# Patient Record
Sex: FEMALE | Race: WHITE | Employment: OTHER | URBAN - NONMETROPOLITAN AREA
[De-identification: names, ages, dates, MRNs, and addresses within clinical notes are randomized per-mention and may not be internally consistent; named-entity substitution may affect disease eponyms.]

---

## 2017-02-02 ENCOUNTER — HOSPITAL ENCOUNTER (OUTPATIENT)
Dept: WOMENS IMAGING | Age: 72
Discharge: HOME | End: 2017-02-02
Payer: MEDICARE

## 2017-02-02 DIAGNOSIS — Z12.31 ENCOUNTER FOR SCREENING MAMMOGRAM FOR BREAST CANCER: ICD-10-CM

## 2017-02-02 PROCEDURE — 77063 BREAST TOMOSYNTHESIS BI: CPT

## 2017-08-31 ENCOUNTER — OFFICE VISIT (OUTPATIENT)
Dept: GASTROENTEROLOGY | Facility: CLINIC | Age: 72
End: 2017-08-31

## 2017-08-31 VITALS
SYSTOLIC BLOOD PRESSURE: 128 MMHG | DIASTOLIC BLOOD PRESSURE: 68 MMHG | BODY MASS INDEX: 29.19 KG/M2 | HEIGHT: 67 IN | WEIGHT: 186 LBS | TEMPERATURE: 97.8 F | HEART RATE: 71 BPM | OXYGEN SATURATION: 98 %

## 2017-08-31 DIAGNOSIS — K21.9 GASTROESOPHAGEAL REFLUX DISEASE WITHOUT ESOPHAGITIS: ICD-10-CM

## 2017-08-31 DIAGNOSIS — K22.4 ESOPHAGEAL SPASM: ICD-10-CM

## 2017-08-31 DIAGNOSIS — R12 HEARTBURN: Primary | ICD-10-CM

## 2017-08-31 PROCEDURE — 99214 OFFICE O/P EST MOD 30 MIN: CPT | Performed by: NURSE PRACTITIONER

## 2017-08-31 RX ORDER — CITALOPRAM 10 MG/1
10 TABLET ORAL DAILY
COMMUNITY
Start: 2017-06-22 | End: 2021-01-04

## 2017-08-31 RX ORDER — RANITIDINE 150 MG/1
TABLET ORAL DAILY
COMMUNITY
End: 2020-08-05

## 2017-08-31 RX ORDER — ATENOLOL 25 MG/1
25 TABLET ORAL DAILY
COMMUNITY
Start: 2017-06-22 | End: 2020-03-09 | Stop reason: SDUPTHER

## 2017-08-31 RX ORDER — ERGOCALCIFEROL 1.25 MG/1
50000 CAPSULE ORAL
COMMUNITY
Start: 2017-06-22 | End: 2021-01-04

## 2017-08-31 RX ORDER — ASPIRIN 81 MG/1
81 TABLET ORAL DAILY
COMMUNITY

## 2017-10-26 ENCOUNTER — ANESTHESIA EVENT (OUTPATIENT)
Dept: GASTROENTEROLOGY | Facility: HOSPITAL | Age: 72
End: 2017-10-26

## 2017-10-26 ENCOUNTER — ANESTHESIA (OUTPATIENT)
Dept: GASTROENTEROLOGY | Facility: HOSPITAL | Age: 72
End: 2017-10-26

## 2017-10-26 ENCOUNTER — HOSPITAL ENCOUNTER (OUTPATIENT)
Facility: HOSPITAL | Age: 72
Setting detail: HOSPITAL OUTPATIENT SURGERY
Discharge: HOME | End: 2017-10-26
Attending: INTERNAL MEDICINE | Admitting: INTERNAL MEDICINE

## 2017-10-26 VITALS
WEIGHT: 183 LBS | SYSTOLIC BLOOD PRESSURE: 104 MMHG | RESPIRATION RATE: 20 BRPM | OXYGEN SATURATION: 97 % | DIASTOLIC BLOOD PRESSURE: 60 MMHG | TEMPERATURE: 97.8 F | HEIGHT: 67 IN | HEART RATE: 60 BPM | BODY MASS INDEX: 28.72 KG/M2

## 2017-10-26 DIAGNOSIS — K21.9 GASTROESOPHAGEAL REFLUX DISEASE WITHOUT ESOPHAGITIS: ICD-10-CM

## 2017-10-26 DIAGNOSIS — R12 HEARTBURN: ICD-10-CM

## 2017-10-26 DIAGNOSIS — K22.4 ESOPHAGEAL SPASM: ICD-10-CM

## 2017-10-26 PROCEDURE — 88305 TISSUE EXAM BY PATHOLOGIST: CPT | Performed by: INTERNAL MEDICINE

## 2017-10-26 PROCEDURE — 43239 EGD BIOPSY SINGLE/MULTIPLE: CPT | Performed by: INTERNAL MEDICINE

## 2017-10-26 PROCEDURE — 25010000002 PROPOFOL 10 MG/ML EMULSION: Performed by: NURSE ANESTHETIST, CERTIFIED REGISTERED

## 2017-10-26 RX ORDER — SODIUM CHLORIDE 0.9 % (FLUSH) 0.9 %
1-10 SYRINGE (ML) INJECTION AS NEEDED
Status: CANCELLED | OUTPATIENT
Start: 2017-10-26

## 2017-10-26 RX ORDER — LIDOCAINE HYDROCHLORIDE 20 MG/ML
INJECTION, SOLUTION INFILTRATION; PERINEURAL AS NEEDED
Status: DISCONTINUED | OUTPATIENT
Start: 2017-10-26 | End: 2017-10-26 | Stop reason: SURG

## 2017-10-26 RX ORDER — SODIUM CHLORIDE 9 MG/ML
500 INJECTION, SOLUTION INTRAVENOUS CONTINUOUS PRN
Status: DISCONTINUED | OUTPATIENT
Start: 2017-10-26 | End: 2017-10-26 | Stop reason: HOSPADM

## 2017-10-26 RX ORDER — SODIUM CHLORIDE 9 MG/ML
100 INJECTION, SOLUTION INTRAVENOUS CONTINUOUS
Status: CANCELLED | OUTPATIENT
Start: 2017-10-26

## 2017-10-26 RX ORDER — PROPOFOL 10 MG/ML
VIAL (ML) INTRAVENOUS AS NEEDED
Status: DISCONTINUED | OUTPATIENT
Start: 2017-10-26 | End: 2017-10-26 | Stop reason: SURG

## 2017-10-26 RX ORDER — SODIUM CHLORIDE 0.9 % (FLUSH) 0.9 %
3 SYRINGE (ML) INJECTION AS NEEDED
Status: DISCONTINUED | OUTPATIENT
Start: 2017-10-26 | End: 2017-10-26 | Stop reason: HOSPADM

## 2017-10-26 RX ADMIN — PROPOFOL 50 MG: 10 INJECTION, EMULSION INTRAVENOUS at 12:23

## 2017-10-26 RX ADMIN — LIDOCAINE HYDROCHLORIDE 20 MG: 20 INJECTION, SOLUTION INFILTRATION; PERINEURAL at 12:19

## 2017-10-26 RX ADMIN — SODIUM CHLORIDE 500 ML: 9 INJECTION, SOLUTION INTRAVENOUS at 11:44

## 2017-10-26 RX ADMIN — PROPOFOL 100 MG: 10 INJECTION, EMULSION INTRAVENOUS at 12:19

## 2017-10-26 NOTE — ANESTHESIA POSTPROCEDURE EVALUATION
Patient: Holli Iyer    Procedure Summary     Date Anesthesia Start Anesthesia Stop Room / Location    10/26/17 1216 1226 [facility] ENDOSCOPY 5 / [facility]       Procedure Diagnosis Surgeon Provider    ESOPHAGOGASTRODUODENOSCOPY WITH ANESTHESIA (N/A Esophagus) Heartburn; Esophageal spasm; Gastroesophageal reflux disease without esophagitis  (Heartburn [R12]; Esophageal spasm [K22.4]; Gastroesophageal reflux disease without esophagitis [K21.9]) MD Antoinette Donald CRNA          Anesthesia Type: general  Last vitals  BP   129/83 (10/26/17 1125)   Temp   97.8 °F (36.6 °C) (10/26/17 1125)   Pulse   72 (10/26/17 1125)   Resp   20 (10/26/17 1125)     SpO2   95 % (10/26/17 1125)     Post Anesthesia Care and Evaluation    Patient location during evaluation: PHASE II  Patient participation: complete - patient participated  Level of consciousness: awake and alert  Pain score: 0  Pain management: adequate  Airway patency: patent  Anesthetic complications: No anesthetic complications  PONV Status: none  Cardiovascular status: acceptable, hemodynamically stable and stable  Respiratory status: acceptable and room air  Hydration status: acceptable

## 2017-10-26 NOTE — PLAN OF CARE
Problem: Patient Care Overview (Adult)  Goal: Plan of Care Review  Outcome: Outcome(s) achieved Date Met:  10/26/17

## 2017-10-26 NOTE — PLAN OF CARE
Problem: Patient Care Overview (Adult)  Goal: Plan of Care Review  Outcome: Ongoing (interventions implemented as appropriate)    10/26/17 1220   Patient Care Overview   Progress improving   Outcome Evaluation   Outcome Summary/Follow up Plan no noted problems

## 2017-10-26 NOTE — ANESTHESIA PREPROCEDURE EVALUATION
Anesthesia Evaluation     history of anesthetic complications (Remote, after breast biopsy): PONV  NPO Solid Status: > 8 hours  NPO Liquid Status: > 8 hours     Airway   Mallampati: I  TM distance: >3 FB  Neck ROM: full  no difficulty expected  Dental - normal exam         Pulmonary - normal exam    breath sounds clear to auscultation  (+) asthma (Seasonal, well-controlled), sleep apnea (Undiagnosed, feels she has it),   (-) recent URI, not a smoker  Cardiovascular   Exercise tolerance: good (4-7 METS)    Patient on routine beta blocker and Beta blocker given within 24 hours of surgery  Rhythm: regular  Rate: normal    (+) valvular problems/murmurs MVP, dysrhythmias (HR was irregular, noted 40 yrs ago, has been receiving atenolol for HR control, no issues noted),   (-) pacemaker, hypertension, past MI, angina, cardiac stents, CABG      Neuro/Psych  (+) TIA (1986, no residual symptoms),    (-) seizures, CVA  GI/Hepatic/Renal/Endo    (+)  GERD well controlled,   (-)  obesity, liver disease, no renal disease, diabetes, hypothyroidism    Musculoskeletal     Abdominal    Substance History      OB/GYN          Other                                        Anesthesia Plan    ASA 2     general   total IV anesthesia  Anesthetic plan and risks discussed with patient.

## 2017-10-26 NOTE — H&P
"    Chief Complaint:   Long-standing GERD    Subjective     HPI:   She has had long-standing GERD symptoms.  This is generally controlled with H2 RA therapy.  She has had no prior endoscopy.    Past Medical History:   Past Medical History:   Diagnosis Date   • Colon polyp    • GERD (gastroesophageal reflux disease)    • HTN (hypertension)    • Insomnia    • MVP (mitral valve prolapse)        Past Surgical History:  [unfilled]    Family History:  Family History   Problem Relation Age of Onset   • Colon cancer Neg Hx    • Colon polyps Neg Hx    • Crohn's disease Neg Hx    • Esophageal cancer Neg Hx    • Irritable bowel syndrome Neg Hx    • Liver cancer Neg Hx    • Liver disease Neg Hx    • Rectal cancer Neg Hx    • Stomach cancer Neg Hx    • Ulcerative colitis Neg Hx        Social History:   reports that she has never smoked. She has never used smokeless tobacco. She reports that she does not drink alcohol.    Medications:   Prescriptions Prior to Admission   Medication Sig Dispense Refill Last Dose   • aspirin 81 MG EC tablet Take  by mouth.   10/25/2017 at 0700   • atenolol (TENORMIN) 50 MG tablet    10/26/2017 at 0700   • citalopram (CeleXA) 10 MG tablet    10/25/2017 at 0700   • raNITIdine (ZANTAC) 150 MG tablet Take  by mouth Daily.   10/25/2017 at 2100   • vitamin D (ERGOCALCIFEROL) 92790 units capsule capsule    10/22/2017 at Unknown time       Allergies:  Penicillins and Sulfa antibiotics    ROS:    General: Weight stable  Resp: No SOA  Cardiovascular: No CP      Objective     /83 (BP Location: Right arm, Patient Position: Sitting)  Pulse 72  Temp 97.8 °F (36.6 °C) (Temporal Artery )   Resp 20  Ht 67\" (170.2 cm)  Wt 183 lb (83 kg)  LMP Comment: menapause  SpO2 95%  BMI 28.66 kg/m2    Physical Exam   Constitutional: Pt is oriented to person, place, and in no distress.  Eyes: No icterus  ENMT: Unremarkable   Cardiovascular: Normal rate, regular rhythm.    Pulmonary/Chest: No distress.  No audible " wheezes  Abdominal: Soft.   Skin: Skin is warm and dry.   Psychiatric: Mood, memory, affect and judgment appear normal.     Assessment/Plan     Diagnosis:  GERD    Anticipated Surgical Procedure:  Endoscopy    The risks, benefits, and alternatives of endoscopy were reviewed with the patient today.  Risks including perforation, with or without dilation, possibly requiring surgery.  Additional risks include risk of bleeding.  There is also the risk of a drug reaction or problems with anesthesia.  This will be discussed with the further by the anesthesia team on the day of the procedure. The benefits include the diagnosis and management of disease of the upper digestive tract.  Alternatives to endoscopy include upper GI series, laboratory testing, radiographic evaluation, or no intervention.  The patient verbalizes understanding and agrees.    Much of this encounter note is an electronic transcription/translation of spoken language to printed text. The electronic translation of spoken language may permit erroneous, or at times, nonsensical words or phrases to be inadvertently transcribed; although I have reviewed the note for such errors, some may still exist.

## 2017-10-26 NOTE — PLAN OF CARE
Problem: GI Endoscopy (Adult)  Goal: Signs and Symptoms of Listed Potential Problems Will be Absent or Manageable (GI Endoscopy)  Outcome: Outcome(s) achieved Date Met:  10/26/17

## 2017-10-27 LAB
CYTO UR: NORMAL
LAB AP CASE REPORT: NORMAL
LAB AP CLINICAL INFORMATION: NORMAL
Lab: NORMAL
PATH REPORT.FINAL DX SPEC: NORMAL
PATH REPORT.GROSS SPEC: NORMAL

## 2018-02-27 ENCOUNTER — HOSPITAL ENCOUNTER (OUTPATIENT)
Dept: WOMENS IMAGING | Age: 73
Discharge: HOME | End: 2018-02-27
Payer: MEDICARE

## 2018-02-27 DIAGNOSIS — Z12.31 VISIT FOR SCREENING MAMMOGRAM: ICD-10-CM

## 2018-02-27 PROCEDURE — 77063 BREAST TOMOSYNTHESIS BI: CPT

## 2018-12-02 ENCOUNTER — APPOINTMENT (OUTPATIENT)
Dept: CT IMAGING | Facility: HOSPITAL | Age: 73
End: 2018-12-02

## 2018-12-02 ENCOUNTER — APPOINTMENT (OUTPATIENT)
Dept: GENERAL RADIOLOGY | Facility: HOSPITAL | Age: 73
End: 2018-12-02

## 2018-12-02 ENCOUNTER — HOSPITAL ENCOUNTER (EMERGENCY)
Facility: HOSPITAL | Age: 73
Discharge: HOME | End: 2018-12-02
Admitting: EMERGENCY MEDICINE

## 2018-12-02 VITALS
BODY MASS INDEX: 29.95 KG/M2 | OXYGEN SATURATION: 100 % | WEIGHT: 190.8 LBS | HEART RATE: 56 BPM | HEIGHT: 67 IN | SYSTOLIC BLOOD PRESSURE: 125 MMHG | RESPIRATION RATE: 17 BRPM | DIASTOLIC BLOOD PRESSURE: 54 MMHG | TEMPERATURE: 97.7 F
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
## 2018-12-02 DIAGNOSIS — S63.501A SPRAIN OF RIGHT WRIST, INITIAL ENCOUNTER: ICD-10-CM

## 2018-12-02 LAB
ALBUMIN SERPL-MCNC: 4.2 G/DL (ref 3.5–5)
ALBUMIN/GLOB SERPL: 1.4 G/DL (ref 1.1–2.5)
ALP SERPL-CCNC: 73 U/L (ref 24–120)
ALT SERPL W P-5'-P-CCNC: 25 U/L (ref 0–54)
ANION GAP SERPL CALCULATED.3IONS-SCNC: 10 MMOL/L (ref 4–13)
APTT PPP: 24.3 SECONDS (ref 24.1–34.8)
AST SERPL-CCNC: 28 U/L (ref 7–45)
BASOPHILS # BLD AUTO: 0.02 10*3/MM3 (ref 0–0.2)
BASOPHILS NFR BLD AUTO: 0.3 % (ref 0–2)
BILIRUB SERPL-MCNC: 0.8 MG/DL (ref 0.1–1)
BUN BLD-MCNC: 14 MG/DL (ref 5–21)
BUN/CREAT SERPL: 22.6 (ref 7–25)
CALCIUM SPEC-SCNC: 9.4 MG/DL (ref 8.4–10.4)
CHLORIDE SERPL-SCNC: 104 MMOL/L (ref 98–110)
CO2 SERPL-SCNC: 30 MMOL/L (ref 24–31)
CREAT BLD-MCNC: 0.62 MG/DL (ref 0.5–1.4)
DEPRECATED RDW RBC AUTO: 38.5 FL (ref 40–54)
EOSINOPHIL # BLD AUTO: 0.19 10*3/MM3 (ref 0–0.7)
EOSINOPHIL NFR BLD AUTO: 2.6 % (ref 0–4)
ERYTHROCYTE [DISTWIDTH] IN BLOOD BY AUTOMATED COUNT: 12.4 % (ref 12–15)
GFR SERPL CREATININE-BSD FRML MDRD: 95 ML/MIN/1.73
GLOBULIN UR ELPH-MCNC: 3.1 GM/DL
GLUCOSE BLD-MCNC: 103 MG/DL (ref 70–100)
HCT VFR BLD AUTO: 40.6 % (ref 37–47)
HGB BLD-MCNC: 13.3 G/DL (ref 12–16)
IMM GRANULOCYTES # BLD: 0.03 10*3/MM3 (ref 0–0.03)
IMM GRANULOCYTES NFR BLD: 0.4 % (ref 0–5)
INR PPP: 0.89 (ref 0.91–1.09)
LYMPHOCYTES # BLD AUTO: 1.19 10*3/MM3 (ref 0.72–4.86)
LYMPHOCYTES NFR BLD AUTO: 16.1 % (ref 15–45)
MCH RBC QN AUTO: 28 PG (ref 28–32)
MCHC RBC AUTO-ENTMCNC: 32.8 G/DL (ref 33–36)
MCV RBC AUTO: 85.5 FL (ref 82–98)
MONOCYTES # BLD AUTO: 0.5 10*3/MM3 (ref 0.19–1.3)
MONOCYTES NFR BLD AUTO: 6.8 % (ref 4–12)
NEUTROPHILS # BLD AUTO: 5.47 10*3/MM3 (ref 1.87–8.4)
NEUTROPHILS NFR BLD AUTO: 73.8 % (ref 39–78)
NRBC BLD MANUAL-RTO: 0 /100 WBC (ref 0–0)
PLATELET # BLD AUTO: 237 10*3/MM3 (ref 130–400)
PMV BLD AUTO: 10.4 FL (ref 6–12)
POTASSIUM BLD-SCNC: 4.2 MMOL/L (ref 3.5–5.3)
PROT SERPL-MCNC: 7.3 G/DL (ref 6.3–8.7)
PROTHROMBIN TIME: 12.3 SECONDS (ref 11.9–14.6)
RBC # BLD AUTO: 4.75 10*6/MM3 (ref 4.2–5.4)
SODIUM BLD-SCNC: 144 MMOL/L (ref 135–145)
TROPONIN I SERPL-MCNC: <0.012 NG/ML (ref 0–0.03)
WBC NRBC COR # BLD: 7.4 10*3/MM3 (ref 4.8–10.8)

## 2018-12-02 PROCEDURE — 99283 EMERGENCY DEPT VISIT LOW MDM: CPT

## 2018-12-02 PROCEDURE — 70486 CT MAXILLOFACIAL W/O DYE: CPT

## 2018-12-02 PROCEDURE — 73110 X-RAY EXAM OF WRIST: CPT

## 2018-12-02 PROCEDURE — 85025 COMPLETE CBC W/AUTO DIFF WBC: CPT | Performed by: PHYSICIAN ASSISTANT

## 2018-12-02 PROCEDURE — 90471 IMMUNIZATION ADMIN: CPT | Performed by: PHYSICIAN ASSISTANT

## 2018-12-02 PROCEDURE — 85610 PROTHROMBIN TIME: CPT | Performed by: PHYSICIAN ASSISTANT

## 2018-12-02 PROCEDURE — 25010000002 TDAP 5-2.5-18.5 LF-MCG/0.5 SUSPENSION: Performed by: PHYSICIAN ASSISTANT

## 2018-12-02 PROCEDURE — 80053 COMPREHEN METABOLIC PANEL: CPT | Performed by: PHYSICIAN ASSISTANT

## 2018-12-02 PROCEDURE — 73130 X-RAY EXAM OF HAND: CPT

## 2018-12-02 PROCEDURE — 85730 THROMBOPLASTIN TIME PARTIAL: CPT | Performed by: PHYSICIAN ASSISTANT

## 2018-12-02 PROCEDURE — 72125 CT NECK SPINE W/O DYE: CPT

## 2018-12-02 PROCEDURE — 70450 CT HEAD/BRAIN W/O DYE: CPT

## 2018-12-02 PROCEDURE — 84484 ASSAY OF TROPONIN QUANT: CPT | Performed by: PHYSICIAN ASSISTANT

## 2018-12-02 PROCEDURE — 90715 TDAP VACCINE 7 YRS/> IM: CPT | Performed by: PHYSICIAN ASSISTANT

## 2018-12-02 RX ADMIN — TETANUS TOXOID, REDUCED DIPHTHERIA TOXOID AND ACELLULAR PERTUSSIS VACCINE, ADSORBED 0.5 ML: 5; 2.5; 8; 8; 2.5 SUSPENSION INTRAMUSCULAR at 03:00

## 2018-12-02 NOTE — ED PROVIDER NOTES
Subjective   History of Present Illness  She is a 73-year-old  female with past medical history of TIA once, MVP who presents to the emergency department with the chief complaint of a fall.  The fall happened 3 hours before.  She was fixing the [employer] decoration at home.  Her foot got tangled in the [employer] lights.  And she fell forward on the face and hit the concrete.  She complains of pain in the right wrist joint and hand following the fall.  She denies open injuries.  She denies bleeding actively.  She denies headache, denies some vomiting loss of consciousness, denies injuries in any joints, denies neck pain.  She also denies chest pain, palpitations, breathing difficulty.  She describes the pain in the hand is more towards the little finger and below.  It is a dull, constant, 10 over 10 severity pain aggravating with movements.    Review of Systems   Constitutional: Negative.    HENT: Negative.    Eyes: Negative.    Respiratory: Negative.    Cardiovascular: Negative.    Gastrointestinal: Negative.    Endocrine: Negative.    Genitourinary: Negative.    Skin: Negative.    Allergic/Immunologic: Negative.    Neurological: Negative.        Past Medical History:   Diagnosis Date   • Colon polyp    • GERD (gastroesophageal reflux disease)    • Insomnia    • Irregular heart beat    • MVP (mitral valve prolapse)        Allergies   Allergen Reactions   • Penicillins Unknown (See Comments)     childhood   • Sulfa Antibiotics Itching       Past Surgical History:   Procedure Laterality Date   • BREAST BIOPSY Left    • COLONOSCOPY  02/12/2015       Family History   Problem Relation Age of Onset   • Colon cancer Neg Hx    • Colon polyps Neg Hx    • Crohn's disease Neg Hx    • Esophageal cancer Neg Hx    • Irritable bowel syndrome Neg Hx    • Liver cancer Neg Hx    • Liver disease Neg Hx    • Rectal cancer Neg Hx    • Stomach cancer Neg Hx    • Ulcerative colitis Neg Hx        Social History     Socioeconomic  History   • Marital status:      Spouse name: Not on file   • Number of children: Not on file   • Years of education: Not on file   • Highest education level: Not on file   Tobacco Use   • Smoking status: Never Smoker   • Smokeless tobacco: Never Used   Substance and Sexual Activity   • Alcohol use: No   • Drug use: No           Objective   Physical Exam   Constitutional: She appears well-developed and well-nourished.   HENT:   Head: Normocephalic and atraumatic.   Eyes: EOM are normal. Pupils are equal, round, and reactive to light.   Neck: Normal range of motion. Neck supple.   Cardiovascular: Normal rate, regular rhythm and normal heart sounds.   Pulmonary/Chest: Effort normal and breath sounds normal.   Abdominal: Soft. Bowel sounds are normal.   Musculoskeletal: Normal range of motion.   Tenderness noted on the medial side of the little finger ranging from the tip of the finger to the distal part of the hand on the medial side.  All the movements at the level of metacarpophalangeal interphalangeal joints are intact in bilateral hands.  Wrist joint movements are intact in bilateral hands.  Elbow joint movements are intact bilaterally.  Distal pulses are felt in bilateral hands.  No sensory deficits noted.  No evidence weldings noted.  No fresh bleeding noted.   Neurological: She is alert.   Skin: Skin is warm and dry. Capillary refill takes less than 2 seconds.       Laceration Repair  Date/Time: 12/2/2018 1:00 PM  Performed by: Eloisa Shanks PA-C  Authorized by: Eloisa Shanks PA-C     Consent:     Consent obtained:  Verbal    Consent given by:  Patient    Risks discussed:  Infection, pain, poor cosmetic result, poor wound healing, need for additional repair and retained foreign body    Alternatives discussed:  No treatment  Laceration details:     Location:  Face    Length (cm):  0.5    Depth (mm):  1  Repair type:     Repair type:  Simple  Pre-procedure details:     Preparation:  Patient was  prepped and draped in usual sterile fashion  Exploration:     Wound exploration: wound explored through full range of motion and entire depth of wound probed and visualized      Contaminated: no    Treatment:     Area cleansed with:  Betadine    Amount of cleaning:  Standard    Irrigation solution:  Sterile saline    Irrigation method:  Syringe    Visualized foreign bodies/material removed: no    Skin repair:     Repair method:  Tissue adhesive  Approximation:     Approximation:  Close    Vermilion border: well-aligned    Post-procedure details:     Dressing:  Non-adherent dressing and sterile dressing    Patient tolerance of procedure:  Tolerated well, no immediate complications               ED Course    She is a 73-year-old  female with a past medical history of chronic hypertension who presents to the emergency department with a chief complaint of fall following loss of balance while she was trying to do some [employer] decoration.  The CTs of head and neck, the x-rays of the hand and wrist are all within normal limits.  Her CBC CMP is within normal limits, troponin within normal limits.  Patient did not give permission for EKG.  She was explained that it is possible that she could have had some irregular heartbeat and she must have fallen down but she insisted that she had she has no problem and she does not want to be evaluated by EKG.  She was explained that if something abnormal is noted can be possibly treated.  Otherwise if might even be dangerous.   She verbalized that she understood all the complications but she also denies EKG at this moment.  She was stable on discharge.  She was administered a dose of tetanus today.  0.5 mL linear laceration was fixed with skin adhesive while she was here in the ED.  Patient handled the procedure very well she was stable after the procedure.                MDM      Final diagnoses:   Fall, initial encounter   Sprain of right wrist, initial encounter             Eloisa Shanks PA-C  12/02/18 1928

## 2019-04-10 ENCOUNTER — HOSPITAL ENCOUNTER (OUTPATIENT)
Dept: WOMENS IMAGING | Age: 74
Discharge: HOME | End: 2019-04-10
Payer: MEDICARE

## 2019-04-10 DIAGNOSIS — Z12.31 ENCOUNTER FOR SCREENING MAMMOGRAM FOR MALIGNANT NEOPLASM OF BREAST: ICD-10-CM

## 2019-04-10 PROCEDURE — 77063 BREAST TOMOSYNTHESIS BI: CPT

## 2019-04-30 ENCOUNTER — HOSPITAL ENCOUNTER (OUTPATIENT)
Dept: ULTRASOUND IMAGING | Age: 74
Discharge: HOME | End: 2019-04-30
Payer: MEDICARE

## 2019-04-30 DIAGNOSIS — N60.01 BREAST CYST, RIGHT: ICD-10-CM

## 2019-04-30 PROCEDURE — 76642 ULTRASOUND BREAST LIMITED: CPT

## 2020-01-27 ENCOUNTER — TELEPHONE (OUTPATIENT)
Dept: INTERNAL MEDICINE | Facility: CLINIC | Age: 75
End: 2020-01-27

## 2020-01-27 NOTE — TELEPHONE ENCOUNTER
WANTING TO DISCUSS HER SLEEP STUDY WITH YOU.  THEY HAVE CALLED HER WITH RESULTS BUT SHE WANTS TO DISCUSS WITH YOU BEFORE SHE DOES ANYTHING MORE.    [phone] IS THE BEST CALLBACK NUMBER

## 2020-01-27 NOTE — TELEPHONE ENCOUNTER
Called pt and reviewed sleep study and strongly enouraged her to begin therapy, which she seems to be agreeable with, just wanted to talk with someone first.

## 2020-02-04 ENCOUNTER — OFFICE VISIT (OUTPATIENT)
Dept: INTERNAL MEDICINE | Facility: CLINIC | Age: 75
End: 2020-02-04

## 2020-02-04 VITALS
SYSTOLIC BLOOD PRESSURE: 123 MMHG | DIASTOLIC BLOOD PRESSURE: 70 MMHG | HEIGHT: 67 IN | WEIGHT: 198 LBS | OXYGEN SATURATION: 95 % | BODY MASS INDEX: 31.08 KG/M2 | HEART RATE: 72 BPM

## 2020-02-04 DIAGNOSIS — G47.33 OSA (OBSTRUCTIVE SLEEP APNEA): ICD-10-CM

## 2020-02-04 DIAGNOSIS — F41.9 ANXIETY: Primary | ICD-10-CM

## 2020-02-04 DIAGNOSIS — I10 BENIGN HYPERTENSION: ICD-10-CM

## 2020-02-04 DIAGNOSIS — Z12.11 SCREENING FOR COLON CANCER: ICD-10-CM

## 2020-02-04 DIAGNOSIS — Z12.4 SCREENING FOR CERVICAL CANCER: ICD-10-CM

## 2020-02-04 PROBLEM — E55.9 VITAMIN D DEFICIENCY: Status: ACTIVE | Noted: 2020-02-04

## 2020-02-04 LAB
DEVELOPER EXPIRATION DATE: NORMAL
DEVELOPER LOT NUMBER: NORMAL
EXPIRATION DATE: NORMAL
FECAL OCCULT BLOOD SCREEN, POC: NEGATIVE
Lab: NORMAL
NEGATIVE CONTROL: NEGATIVE
POSITIVE CONTROL: POSITIVE

## 2020-02-04 PROCEDURE — 99214 OFFICE O/P EST MOD 30 MIN: CPT | Performed by: INTERNAL MEDICINE

## 2020-02-04 PROCEDURE — 82270 OCCULT BLOOD FECES: CPT | Performed by: INTERNAL MEDICINE

## 2020-02-04 RX ORDER — LORAZEPAM 0.5 MG/1
0.5 TABLET ORAL NIGHTLY PRN
Qty: 14 TABLET | Refills: 0 | Status: SHIPPED | OUTPATIENT
Start: 2020-02-04 | End: 2020-08-05

## 2020-02-04 NOTE — PROGRESS NOTES
Subjective   Holli Iyer is a 74 y.o. female.   Chief Complaint   Patient presents with   • Annual Exam     breast exam today       This is an annual evaluation on patient she is having some difficulty with her CPAP she is fighting that she is having a hard time falling asleep with a mask on in the additional sounds she does have some underlying anxiety her blood pressure seems to be well controlled at home reviewed with her some readings that she has discussed       The following portions of the patient's history were reviewed and updated as appropriate: allergies, current medications, past family history, past medical history, past social history, past surgical history and problem list.    Review of Systems   Constitutional: Positive for fatigue. Negative for activity change, appetite change, fever, unexpected weight gain and unexpected weight loss.   HENT: Negative for swollen glands, trouble swallowing and voice change.    Eyes: Negative for blurred vision and visual disturbance.   Respiratory: Negative for cough and shortness of breath.    Cardiovascular: Negative for chest pain, palpitations and leg swelling.   Gastrointestinal: Negative for abdominal pain, constipation, diarrhea, nausea, vomiting and indigestion.   Endocrine: Negative for cold intolerance, heat intolerance, polydipsia and polyphagia.   Genitourinary: Negative for dysuria and frequency.   Musculoskeletal: Negative for arthralgias, back pain, joint swelling and neck pain.   Skin: Negative for color change, rash and skin lesions.   Neurological: Negative for dizziness, weakness, headache, memory problem and confusion.   Hematological: Does not bruise/bleed easily.   Psychiatric/Behavioral: Negative for agitation, hallucinations and suicidal ideas. The patient is not nervous/anxious.        Objective   Past Medical History:   Diagnosis Date   • Colon polyp    • GERD (gastroesophageal reflux disease)    • Insomnia    • Irregular heart beat    •  MVP (mitral valve prolapse)    • Sleep apnea       Past Surgical History:   Procedure Laterality Date   • BREAST BIOPSY Left    • COLONOSCOPY  02/12/2015   • ENDOSCOPY N/A 10/26/2017    Procedure: ESOPHAGOGASTRODUODENOSCOPY WITH ANESTHESIA;  Surgeon: Selena Thomas MD;  Location: [facility] ENDOSCOPY;  Service:         Current Outpatient Medications:   •  aspirin 81 MG EC tablet, Take 81 mg by mouth Daily., Disp: , Rfl:   •  atenolol (TENORMIN) 25 MG tablet, Take 25 mg by mouth Daily., Disp: , Rfl:   •  citalopram (CeleXA) 10 MG tablet, Take 10 mg by mouth Daily., Disp: , Rfl:   •  vitamin D (ERGOCALCIFEROL) 31362 units capsule capsule, 50,000 Units Every 7 (Seven) Days., Disp: , Rfl:   •  LORazepam (ATIVAN) 0.5 MG tablet, Take 1 tablet by mouth At Night As Needed for Anxiety., Disp: 14 tablet, Rfl: 0  •  raNITIdine (ZANTAC) 150 MG tablet, Take  by mouth Daily., Disp: , Rfl:    Vitals:    02/04/20 1041   BP: 123/70   Pulse: 72   SpO2: 95%     Physical Exam   Constitutional: She is oriented to person, place, and time. She appears well-developed and well-nourished.   HENT:   Head: Normocephalic and atraumatic.   Right Ear: External ear normal.   Left Ear: External ear normal.   Nose: Nose normal.   Mouth/Throat: Oropharynx is clear and moist.   Eyes: Pupils are equal, round, and reactive to light. Conjunctivae and EOM are normal.   Neck: Normal range of motion. Neck supple. No thyromegaly present.   Cardiovascular: Normal rate, regular rhythm, normal heart sounds and intact distal pulses.   Pulmonary/Chest: Effort normal and breath sounds normal.   Abdominal: Soft. Bowel sounds are normal.   Genitourinary:   Genitourinary Comments: Her vaginal examination shows some mild prolapse of the vaginal canal particularly in the 11 o'clock position the vagina seems to prolapse down in front of her cervix when I examined her cervix uterus rectal exam are all unremarkable Pap smear was obtained   Lymphadenopathy:     She has no  cervical adenopathy.   Neurological: She is alert and oriented to person, place, and time.   Skin: Skin is warm and dry.   Psychiatric: She has a normal mood and affect. Her behavior is normal. Thought content normal.   Nursing note and vitals reviewed.        Patient's Body mass index is 31.01 kg/m². BMI is above normal parameters. Recommendations include: nutrition counseling.      Assessment/Plan   Diagnoses and all orders for this visit:    1. Anxiety (Primary)  -     LORazepam (ATIVAN) 0.5 MG tablet; Take 1 tablet by mouth At Night As Needed for Anxiety.  Dispense: 14 tablet; Refill: 0    2. Screening for colon cancer  -     POCT occult blood x 1 stool    3. Screening for cervical cancer  -     Pap IG, Rfx HPV All Pth    4. Benign hypertension    5. GILL (obstructive sleep apnea)    Plan recommendations at this point patient is not tolerating her CPAP I think that affects her blood pressure and other health objectives therefore adding lorazepam to be used at night just as needed to help her make the transition to her CPAP machine annual blood work is obtained

## 2020-02-05 LAB
CONV .: NORMAL
CYTOLOGIST CVX/VAG CYTO: NORMAL
CYTOLOGY CVX/VAG DOC CYTO: NORMAL
CYTOLOGY CVX/VAG DOC THIN PREP: NORMAL
DX ICD CODE: NORMAL
HIV 1 & 2 AB SER-IMP: NORMAL
OTHER STN SPEC: NORMAL
STAT OF ADQ CVX/VAG CYTO-IMP: NORMAL

## 2020-03-04 ENCOUNTER — TELEPHONE (OUTPATIENT)
Dept: INTERNAL MEDICINE | Facility: CLINIC | Age: 75
End: 2020-03-04

## 2020-03-04 DIAGNOSIS — J33.9 NOSE POLYP: Primary | ICD-10-CM

## 2020-03-04 NOTE — TELEPHONE ENCOUNTER
Patient would like referral to ENT for nose polyp, states it was discussed at last visit but I do not see anything is visit about referral to ENT is this okay?     I called and spoke with patient, I told her to continue same dosage of vitamin d.

## 2020-03-04 NOTE — TELEPHONE ENCOUNTER
Patient is requesting a call back to discuss if she still needs a referral to ent and wants to know if she needs to changed her vitamin d intake.

## 2020-03-09 RX ORDER — ATENOLOL 25 MG/1
25 TABLET ORAL DAILY
Qty: 90 TABLET | Refills: 3 | Status: SHIPPED | OUTPATIENT
Start: 2020-03-09 | End: 2021-03-31

## 2020-03-09 NOTE — TELEPHONE ENCOUNTER
PT called to request a refill on the following medication: atenolol (TENORMIN) 25 MG tablet.    Confirmed Pharmacy: [facility] - [place], [address] [phone] [facility] [phone]

## 2020-04-01 ENCOUNTER — HOSPITAL ENCOUNTER (OUTPATIENT)
Dept: WOMENS IMAGING | Age: 75
Discharge: HOME | End: 2020-04-01
Payer: MEDICARE

## 2020-04-01 PROCEDURE — 76642 ULTRASOUND BREAST LIMITED: CPT

## 2020-04-01 PROCEDURE — G0279 TOMOSYNTHESIS, MAMMO: HCPCS

## 2020-04-20 ENCOUNTER — TELEPHONE (OUTPATIENT)
Dept: OTOLARYNGOLOGY | Facility: CLINIC | Age: 75
End: 2020-04-20

## 2020-05-08 ENCOUNTER — TELEPHONE (OUTPATIENT)
Dept: INTERNAL MEDICINE | Facility: CLINIC | Age: 75
End: 2020-05-08

## 2020-05-08 NOTE — TELEPHONE ENCOUNTER
Patient called and stated that she needs a telehealth appointment with Dr. Sanchez. She said that any day or time would work fine. It is for medicare purposes.    She can be contacted at [phone].

## 2020-05-18 ENCOUNTER — OFFICE VISIT (OUTPATIENT)
Dept: INTERNAL MEDICINE | Facility: CLINIC | Age: 75
End: 2020-05-18

## 2020-05-18 DIAGNOSIS — G47.33 OSA (OBSTRUCTIVE SLEEP APNEA): Primary | ICD-10-CM

## 2020-05-18 DIAGNOSIS — M54.2 CERVICALGIA: ICD-10-CM

## 2020-05-18 PROBLEM — I34.1 MITRAL VALVE PROLAPSE: Status: ACTIVE | Noted: 2020-05-18

## 2020-05-18 PROBLEM — F32.89 MENOPAUSAL DEPRESSION: Status: ACTIVE | Noted: 2020-05-18

## 2020-05-18 PROBLEM — K90.41 GLUTEN INTOLERANCE: Status: ACTIVE | Noted: 2020-05-18

## 2020-05-18 PROBLEM — S32.009A CLOSED FRACTURE OF LUMBAR VERTEBRA WITHOUT SPINAL CORD INJURY (HCC): Status: ACTIVE | Noted: 2020-05-18

## 2020-05-18 PROBLEM — Z78.0 POSTMENOPAUSAL STATUS: Status: ACTIVE | Noted: 2020-05-18

## 2020-05-18 PROBLEM — G47.00 INSOMNIA: Status: ACTIVE | Noted: 2020-05-18

## 2020-05-18 PROBLEM — D27.9 BENIGN NEOPLASM OF OVARY: Status: ACTIVE | Noted: 2020-05-18

## 2020-05-18 PROBLEM — N81.6 RECTOCELE: Status: ACTIVE | Noted: 2020-05-18

## 2020-05-18 PROBLEM — M17.10 ARTHRITIS OF KNEE: Status: ACTIVE | Noted: 2020-05-18

## 2020-05-18 PROBLEM — N81.11 MIDLINE CYSTOCELE: Status: ACTIVE | Noted: 2020-05-18

## 2020-05-18 PROBLEM — M50.33 DEGENERATION OF CERVICOTHORACIC INTERVERTEBRAL DISC: Status: ACTIVE | Noted: 2020-05-18

## 2020-05-18 PROBLEM — H40.9 GLAUCOMA: Status: ACTIVE | Noted: 2020-05-18

## 2020-05-18 PROBLEM — N60.01 BREAST CYST, RIGHT: Status: ACTIVE | Noted: 2020-05-18

## 2020-05-18 PROBLEM — N81.4 UTERINE PROLAPSE: Status: ACTIVE | Noted: 2020-05-18

## 2020-05-18 PROBLEM — R80.9 PROTEINURIA: Status: ACTIVE | Noted: 2020-05-18

## 2020-05-18 PROCEDURE — 99422 OL DIG E/M SVC 11-20 MIN: CPT | Performed by: INTERNAL MEDICINE

## 2020-05-18 NOTE — PROGRESS NOTES
Subjective   Holli Iyer is a 75 y.o. female.   Chief Complaint   Patient presents with   • Sleep Apnea     Visit for C-pap compliance.  Pt. says she uses her Cpap nightly and voices improvement of related symptoms since starting therapy.  [employer] supplies her equipment, I don't see that they have sent us a Cpap compliance report.   • Neck Pain     c/o neck pain/soreness after pulling weeds, asking for recommendations as she has been quite uncomfortable.       Patient seems to be tolerating her CPAP she feels better she is compliant with the CPAP.  She is also telling me about having some neck pains she was digging in her yard gardening.  Subsequently developed some pain in her neck.    You have chosen to receive care through a telephone visit. Do you consent to use a telephone visit for your medical care today? Yes       The following portions of the patient's history were reviewed and updated as appropriate: allergies, current medications, past family history, past medical history, past social history, past surgical history and problem list.    Review of Systems   Constitutional: Negative for activity change, appetite change, fatigue, fever, unexpected weight gain and unexpected weight loss.   HENT: Negative for swollen glands, trouble swallowing and voice change.    Eyes: Negative for blurred vision and visual disturbance.   Respiratory: Negative for cough and shortness of breath.    Cardiovascular: Negative for chest pain, palpitations and leg swelling.   Gastrointestinal: Negative for abdominal pain, constipation, diarrhea, nausea, vomiting and indigestion.   Endocrine: Negative for cold intolerance, heat intolerance, polydipsia and polyphagia.   Genitourinary: Negative for dysuria and frequency.   Musculoskeletal: Positive for neck pain and neck stiffness. Negative for arthralgias, back pain and joint swelling.   Skin: Negative for color change, rash and skin lesions.   Neurological: Negative for  dizziness, weakness, headache, memory problem and confusion.   Hematological: Does not bruise/bleed easily.   Psychiatric/Behavioral: Negative for agitation, hallucinations and suicidal ideas. The patient is not nervous/anxious.        Objective   Past Medical History:   Diagnosis Date   • Colon polyp    • GERD (gastroesophageal reflux disease)    • Insomnia    • Irregular heart beat    • MVP (mitral valve prolapse)    • Sleep apnea       Past Surgical History:   Procedure Laterality Date   • BREAST BIOPSY Left    • COLONOSCOPY  02/12/2015   • ENDOSCOPY N/A 10/26/2017    Procedure: ESOPHAGOGASTRODUODENOSCOPY WITH ANESTHESIA;  Surgeon: Selena Thomas MD;  Location: [facility] ENDOSCOPY;  Service:         Current Outpatient Medications:   •  aspirin 81 MG EC tablet, Take 81 mg by mouth Daily., Disp: , Rfl:   •  atenolol (TENORMIN) 25 MG tablet, Take 1 tablet by mouth Daily., Disp: 90 tablet, Rfl: 3  •  citalopram (CeleXA) 10 MG tablet, Take 10 mg by mouth Daily., Disp: , Rfl:   •  LORazepam (ATIVAN) 0.5 MG tablet, Take 1 tablet by mouth At Night As Needed for Anxiety., Disp: 14 tablet, Rfl: 0  •  raNITIdine (ZANTAC) 150 MG tablet, Take  by mouth Daily., Disp: , Rfl:   •  vitamin D (ERGOCALCIFEROL) 18776 units capsule capsule, 50,000 Units Every 7 (Seven) Days., Disp: , Rfl:      There were no vitals filed for this visit.  There were no vitals filed for this visit.  Patient's There is no height or weight on file to calculate BMI. BMI is within normal parameters. No follow-up required..      Physical Exam   Constitutional: She is oriented to person, place, and time.   Neurological: She is alert and oriented to person, place, and time.   Psychiatric: She has a normal mood and affect. Her behavior is normal.             Assessment/Plan   Diagnoses and all orders for this visit:    1. GILL (obstructive sleep apnea) (Primary)    2. Cervicalgia    At this point patient is tolerant of her CPAP she is using it regularly.  In regard  to her neck pains the sounds strain related there is no radiation of pain into her arms to indicate a herniated disc I am going to email her a set of neck stretches.      This visit has been rescheduled as a phone visit to comply with patient safety concerns in accordance with CDC recommendations. Total time of discussion was 12 minutes.

## 2020-08-05 ENCOUNTER — OFFICE VISIT (OUTPATIENT)
Dept: INTERNAL MEDICINE | Facility: CLINIC | Age: 75
End: 2020-08-05

## 2020-08-05 VITALS
BODY MASS INDEX: 30.83 KG/M2 | HEART RATE: 87 BPM | SYSTOLIC BLOOD PRESSURE: 124 MMHG | WEIGHT: 196.4 LBS | HEIGHT: 67 IN | OXYGEN SATURATION: 98 % | TEMPERATURE: 98.5 F | DIASTOLIC BLOOD PRESSURE: 64 MMHG

## 2020-08-05 DIAGNOSIS — I10 BENIGN HYPERTENSION: Primary | ICD-10-CM

## 2020-08-05 DIAGNOSIS — H81.10 BENIGN PAROXYSMAL POSITIONAL VERTIGO, UNSPECIFIED LATERALITY: ICD-10-CM

## 2020-08-05 DIAGNOSIS — G47.33 OSA (OBSTRUCTIVE SLEEP APNEA): ICD-10-CM

## 2020-08-05 PROCEDURE — 99214 OFFICE O/P EST MOD 30 MIN: CPT | Performed by: INTERNAL MEDICINE

## 2020-08-05 NOTE — PROGRESS NOTES
Subjective   Holli Iyer is a 75 y.o. female.   Chief Complaint   Patient presents with   • Hypertension     6 MONTH FOLLOW UP    • Dizziness     STARTED ABOUT A WEEK AGO; ONLY WHEN SHE WAKES UP TO GET UP    • Pain     2ND TOE ON LEFT FOOT; BELIEVES SHE BROKE IT SEVERAL WEEKS AGO AND IS STILL RED AND SWOLLEN       Patient is here for routine high blood pressure follow-up.  She is apparently experiencing episodes of disequilibrium and dizziness.  The most recent episode of dizziness occurred for about 1 week duration was helped by stopping her CPAP.  She does have some sinus drainage.  She uses her CPAP regularly and feels much better since having it instituted.       The following portions of the patient's history were reviewed and updated as appropriate: allergies, current medications, past family history, past medical history, past social history, past surgical history and problem list.    Review of Systems   Constitutional: Negative for activity change, appetite change, fatigue, fever, unexpected weight gain and unexpected weight loss.   HENT: Negative for swollen glands, trouble swallowing and voice change.    Eyes: Negative for blurred vision and visual disturbance.   Respiratory: Negative for cough and shortness of breath.    Cardiovascular: Negative for chest pain, palpitations and leg swelling.   Gastrointestinal: Negative for abdominal pain, constipation, diarrhea, nausea, vomiting and indigestion.   Endocrine: Negative for cold intolerance, heat intolerance, polydipsia and polyphagia.   Genitourinary: Negative for dysuria and frequency.   Musculoskeletal: Negative for arthralgias, back pain, joint swelling and neck pain.   Skin: Negative for color change, rash and skin lesions.   Neurological: Negative for dizziness, weakness, headache, memory problem and confusion.   Hematological: Does not bruise/bleed easily.   Psychiatric/Behavioral: Negative for agitation, hallucinations and suicidal ideas. The  patient is not nervous/anxious.        Objective   Past Medical History:   Diagnosis Date   • Colon polyp    • GERD (gastroesophageal reflux disease)    • Insomnia    • Irregular heart beat    • MVP (mitral valve prolapse)    • Sleep apnea       Past Surgical History:   Procedure Laterality Date   • BREAST BIOPSY Left    • COLONOSCOPY  02/12/2015   • ENDOSCOPY N/A 10/26/2017    Procedure: ESOPHAGOGASTRODUODENOSCOPY WITH ANESTHESIA;  Surgeon: Selena Thomas MD;  Location: [facility] ENDOSCOPY;  Service:         Current Outpatient Medications:   •  aspirin 81 MG EC tablet, Take 81 mg by mouth Daily., Disp: , Rfl:   •  atenolol (TENORMIN) 25 MG tablet, Take 1 tablet by mouth Daily., Disp: 90 tablet, Rfl: 3  •  citalopram (CeleXA) 10 MG tablet, Take 10 mg by mouth Daily., Disp: , Rfl:   •  vitamin D (ERGOCALCIFEROL) 32373 units capsule capsule, 50,000 Units Every 7 (Seven) Days., Disp: , Rfl:      Vitals:    08/05/20 1024   BP: 124/64   Pulse: 87   Temp: 98.5 °F (36.9 °C)   SpO2: 98%         08/05/20  1024   Weight: 89.1 kg (196 lb 6.4 oz)     Patient's Body mass index is 30.76 kg/m². BMI is above normal parameters. Recommendations include: exercise counseling and nutrition counseling.      Physical Exam   Constitutional: She is oriented to person, place, and time. She appears well-developed and well-nourished.   HENT:   Head: Normocephalic and atraumatic.   Right Ear: External ear normal.   Left Ear: External ear normal.   Nose: Nose normal.   Mouth/Throat: Oropharynx is clear and moist.   Posterior pharynx has some subtle nodularity   Eyes: Pupils are equal, round, and reactive to light. Conjunctivae and EOM are normal.   Neck: Normal range of motion. Neck supple. No thyromegaly present.   Cardiovascular: Normal rate, regular rhythm, normal heart sounds and intact distal pulses.   Pulmonary/Chest: Effort normal and breath sounds normal.   Abdominal: Soft. Bowel sounds are normal.   Lymphadenopathy:     She has no cervical  adenopathy.   Neurological: She is alert and oriented to person, place, and time.   Skin: Skin is warm and dry.   Psychiatric: She has a normal mood and affect. Her behavior is normal. Thought content normal.   Nursing note and vitals reviewed.  Answers for HPI/ROS submitted by the patient on 8/4/2020   What is the primary reason for your visit?: Other  Please describe your symptoms.: I am having morning dizziness.  Have you had these symptoms before?: No  How long have you been having these symptoms?: 5-7 days  Please list any medications you are currently taking for this condition.: none  Please describe any probable cause for these symptoms. : sleep apnea?            Assessment/Plan   Diagnoses and all orders for this visit:    1. Benign hypertension (Primary)    2. GILL (obstructive sleep apnea)    3. Benign paroxysmal positional vertigo, unspecified laterality    Patient's blood pressure is well controlled.  Her obstructive sleep apnea is taking care of by her usage of CPAP.  In regard to the dizziness is most likely associated with seasonal allergies for which I have suggested over-the-counter medication like Zyrtec or Allegra.  We also spent time talking about her sleep her vivid dreams I suggested she  a book called why we sleep.

## 2020-09-16 ENCOUNTER — HOSPITAL ENCOUNTER (OUTPATIENT)
Dept: ULTRASOUND IMAGING | Facility: HOSPITAL | Age: 75
Discharge: HOME | End: 2020-09-16
Admitting: INTERNAL MEDICINE

## 2020-09-16 ENCOUNTER — OFFICE VISIT (OUTPATIENT)
Dept: INTERNAL MEDICINE | Facility: CLINIC | Age: 75
End: 2020-09-16

## 2020-09-16 VITALS
HEART RATE: 67 BPM | OXYGEN SATURATION: 96 % | BODY MASS INDEX: 31.3 KG/M2 | DIASTOLIC BLOOD PRESSURE: 80 MMHG | HEIGHT: 67 IN | TEMPERATURE: 98.2 F | SYSTOLIC BLOOD PRESSURE: 136 MMHG | WEIGHT: 199.4 LBS

## 2020-09-16 DIAGNOSIS — G47.33 OSA (OBSTRUCTIVE SLEEP APNEA): ICD-10-CM

## 2020-09-16 DIAGNOSIS — I10 BENIGN HYPERTENSION: ICD-10-CM

## 2020-09-16 DIAGNOSIS — Z23 NEED FOR VACCINATION: Primary | ICD-10-CM

## 2020-09-16 DIAGNOSIS — M79.89 LEG SWELLING: ICD-10-CM

## 2020-09-16 PROCEDURE — G0008 ADMIN INFLUENZA VIRUS VAC: HCPCS | Performed by: INTERNAL MEDICINE

## 2020-09-16 PROCEDURE — 90686 IIV4 VACC NO PRSV 0.5 ML IM: CPT | Performed by: INTERNAL MEDICINE

## 2020-09-16 PROCEDURE — 93971 EXTREMITY STUDY: CPT

## 2020-09-16 PROCEDURE — 99214 OFFICE O/P EST MOD 30 MIN: CPT | Performed by: INTERNAL MEDICINE

## 2020-09-16 NOTE — PROGRESS NOTES
Subjective   Holli Iyer is a 75 y.o. female.   Chief Complaint   Patient presents with   • Foot Swelling     BOTH FEET, LEFT IS WORSE. WORSE WHEN SHE HAS SPENT MORE TIME SITTING        Patient's here today to follow-up for hypertension obstructive sleep apnea as well as sleep disorder.  I did recommend she do some reading about sleep disorders and since that time she has finished book I had recommended she was quite invigorated from that read and is trying to incorporate some of those recommendations into her plan.       The following portions of the patient's history were reviewed and updated as appropriate: allergies, current medications, past family history, past medical history, past social history, past surgical history and problem list.    Review of Systems   Constitutional: Negative for activity change, appetite change, fatigue, fever, unexpected weight gain and unexpected weight loss.   HENT: Negative for swollen glands, trouble swallowing and voice change.    Eyes: Negative for blurred vision and visual disturbance.   Respiratory: Negative for cough and shortness of breath.    Cardiovascular: Negative for chest pain, palpitations and leg swelling.   Gastrointestinal: Negative for abdominal pain, constipation, diarrhea, nausea, vomiting and indigestion.   Endocrine: Negative for cold intolerance, heat intolerance, polydipsia and polyphagia.   Genitourinary: Negative for dysuria and frequency.   Musculoskeletal: Negative for arthralgias, back pain, joint swelling and neck pain.   Skin: Negative for color change, rash and skin lesions.   Neurological: Negative for dizziness, weakness, headache, memory problem and confusion.   Hematological: Does not bruise/bleed easily.   Psychiatric/Behavioral: Negative for agitation, hallucinations and suicidal ideas. The patient is not nervous/anxious.        Objective   Past Medical History:   Diagnosis Date   • Colon polyp    • GERD (gastroesophageal reflux disease)     • Insomnia    • Irregular heart beat    • MVP (mitral valve prolapse)    • Sleep apnea       Past Surgical History:   Procedure Laterality Date   • BREAST BIOPSY Left    • COLONOSCOPY  02/12/2015   • ENDOSCOPY N/A 10/26/2017    Procedure: ESOPHAGOGASTRODUODENOSCOPY WITH ANESTHESIA;  Surgeon: Selena Thomas MD;  Location: [facility] ENDOSCOPY;  Service:         Current Outpatient Medications:   •  aspirin 81 MG EC tablet, Take 81 mg by mouth Daily., Disp: , Rfl:   •  atenolol (TENORMIN) 25 MG tablet, Take 1 tablet by mouth Daily., Disp: 90 tablet, Rfl: 3  •  citalopram (CeleXA) 10 MG tablet, Take 10 mg by mouth Daily., Disp: , Rfl:   •  vitamin D (ERGOCALCIFEROL) 56941 units capsule capsule, 50,000 Units Every 7 (Seven) Days., Disp: , Rfl:      Vitals:    09/16/20 0924   BP: 136/80   Pulse: 67   Temp: 98.2 °F (36.8 °C)   SpO2: 96%         09/16/20 0924   Weight: 90.4 kg (199 lb 6.4 oz)     Patient's Body mass index is 31.23 kg/m². BMI is .      Physical Exam  Constitutional:       Appearance: She is well-developed.   HENT:      Head: Normocephalic and atraumatic.   Eyes:      Pupils: Pupils are equal, round, and reactive to light.   Neck:      Musculoskeletal: Normal range of motion and neck supple.   Cardiovascular:      Rate and Rhythm: Normal rate and regular rhythm.   Pulmonary:      Effort: Pulmonary effort is normal.      Breath sounds: Normal breath sounds.   Abdominal:      General: Bowel sounds are normal.      Palpations: Abdomen is soft.   Musculoskeletal: Normal range of motion.      Comments: Large ropey varicose vein left lower extremity with point tenderness in the medial portion of the upper lower leg   Skin:     General: Skin is warm and dry.   Neurological:      Mental Status: She is alert and oriented to person, place, and time.   Psychiatric:         Behavior: Behavior normal.               Assessment/Plan   Diagnoses and all orders for this visit:    1. Need for vaccination (Primary)  -      Fluarix/Fluzone/Afluria Quad>6 Months    2. Benign hypertension    3. GILL (obstructive sleep apnea)    4. Leg swelling  -     US Venous Doppler Lower Extremity Left (duplex); Future    At this point no changes being made in patient's medications she is tolerating her medications well.  She is having some discomfort in her left leg particularly in the medial portion just distal to her knee.  I palpated what is a large ropey varicose vein and at one point in the vein I located the tenderness that she is experiencing.  I have recommended venous ultrasound to make sure that there is no deep clots we talked about compression hose taking aspirin on a regular basis and elevating her leg when she has difficulty.

## 2020-12-19 PROCEDURE — U0004 COV-19 TEST NON-CDC HGH THRU: HCPCS | Performed by: NURSE PRACTITIONER

## 2021-01-04 RX ORDER — CITALOPRAM 10 MG/1
TABLET ORAL
Qty: 90 TABLET | Refills: 3 | Status: SHIPPED | OUTPATIENT
Start: 2021-01-04 | End: 2021-10-25 | Stop reason: SDUPTHER

## 2021-01-04 RX ORDER — ERGOCALCIFEROL 1.25 MG/1
CAPSULE ORAL
Qty: 24 CAPSULE | Refills: 3 | Status: SHIPPED | OUTPATIENT
Start: 2021-01-04 | End: 2022-02-15 | Stop reason: SDUPTHER

## 2021-02-03 ENCOUNTER — OFFICE VISIT (OUTPATIENT)
Dept: INTERNAL MEDICINE | Facility: CLINIC | Age: 76
End: 2021-02-03

## 2021-02-03 ENCOUNTER — HOSPITAL ENCOUNTER (OUTPATIENT)
Dept: GENERAL RADIOLOGY | Facility: HOSPITAL | Age: 76
Discharge: HOME | End: 2021-02-03
Admitting: INTERNAL MEDICINE

## 2021-02-03 VITALS
TEMPERATURE: 97.8 F | WEIGHT: 203 LBS | BODY MASS INDEX: 31.86 KG/M2 | OXYGEN SATURATION: 98 % | DIASTOLIC BLOOD PRESSURE: 70 MMHG | HEIGHT: 67 IN | SYSTOLIC BLOOD PRESSURE: 138 MMHG | HEART RATE: 78 BPM

## 2021-02-03 DIAGNOSIS — Z12.4 SCREENING FOR CERVICAL CANCER: ICD-10-CM

## 2021-02-03 DIAGNOSIS — E55.9 VITAMIN D DEFICIENCY: ICD-10-CM

## 2021-02-03 DIAGNOSIS — J40 BRONCHITIS: ICD-10-CM

## 2021-02-03 DIAGNOSIS — Z23 NEED FOR 23-POLYVALENT PNEUMOCOCCAL POLYSACCHARIDE VACCINE: ICD-10-CM

## 2021-02-03 DIAGNOSIS — Z11.59 NEED FOR HEPATITIS C SCREENING TEST: ICD-10-CM

## 2021-02-03 DIAGNOSIS — Z12.11 SCREEN FOR COLON CANCER: ICD-10-CM

## 2021-02-03 DIAGNOSIS — I10 BENIGN HYPERTENSION: Primary | ICD-10-CM

## 2021-02-03 PROCEDURE — 71046 X-RAY EXAM CHEST 2 VIEWS: CPT

## 2021-02-03 PROCEDURE — G0438 PPPS, INITIAL VISIT: HCPCS | Performed by: INTERNAL MEDICINE

## 2021-02-03 PROCEDURE — 82270 OCCULT BLOOD FECES: CPT | Performed by: INTERNAL MEDICINE

## 2021-02-03 PROCEDURE — 90732 PPSV23 VACC 2 YRS+ SUBQ/IM: CPT | Performed by: INTERNAL MEDICINE

## 2021-02-03 PROCEDURE — G0009 ADMIN PNEUMOCOCCAL VACCINE: HCPCS | Performed by: INTERNAL MEDICINE

## 2021-02-03 NOTE — PROGRESS NOTES
The ABCs of the Annual Wellness Visit  Initial Medicare Wellness Visit    Chief Complaint   Patient presents with   • Medicare Wellness-Initial Visit       Subjective   History of Present Illness:  Holli Iyer is a 75 y.o. female who presents for an Initial Medicare Wellness Visit.    HEALTH RISK ASSESSMENT    Recent Hospitalizations:  No hospitalization(s) within the last year.    Current Medical Providers:  Patient Care Team:  Gavino Sanchez MD as PCP - General (Internal Medicine)  Eulogio Zamarripa Jr., MD as Consulting Physician (Otolaryngology)  Ebenezer Luis PA as Physician Assistant (Otolaryngology)  Gavino Sanchez MD as Referring Physician (Internal Medicine)    Smoking Status:  Social History     Tobacco Use   Smoking Status Never Smoker   Smokeless Tobacco Never Used       Alcohol Consumption:  Social History     Substance and Sexual Activity   Alcohol Use Yes   • Frequency: Monthly or less    Comment: galss of wine rarley        Depression Screen:   PHQ-2/PHQ-9 Depression Screening 2/3/2021   Little interest or pleasure in doing things 0   Feeling down, depressed, or hopeless 0   Total Score 0       Fall Risk Screen:  MAN Fall Risk Assessment was completed, and patient is at LOW risk for falls.Assessment completed on:2/3/2021    Health Habits and Functional and Cognitive Screening:  Functional & Cognitive Status 2/3/2021   Do you have difficulty preparing food and eating? No   Do you have difficulty bathing yourself, getting dressed or grooming yourself? No   Do you have difficulty using the toilet? No   Do you have difficulty moving around from place to place? No   Do you have trouble with steps or getting out of a bed or a chair? No   Current Diet Well Balanced Diet   Dental Exam Not up to date   Eye Exam Not up to date   Exercise (times per week) 4 times per week   Current Exercise Activities Include Walking   Do you need help using the phone?  No   Are you deaf or do you have  serious difficulty hearing?  No   Do you need help with transportation? No   Do you need help shopping? No   Do you need help preparing meals?  No   Do you need help with housework?  No   Do you need help with laundry? No   Do you need help taking your medications? No   Do you need help managing money? No   Do you ever drive or ride in a car without wearing a seat belt? No   Have you felt unusual stress, anger or loneliness in the last month? No   Who do you live with? Spouse   If you need help, do you have trouble finding someone available to you? No   Have you been bothered in the last four weeks by sexual problems? No   Do you have difficulty concentrating, remembering or making decisions? No         Does the patient have evidence of cognitive impairment? No    Asprin use counseling:Taking ASA appropriately as indicated    Age-appropriate Screening Schedule:  Refer to the list below for future screening recommendations based on patient's age, sex and/or medical conditions. Orders for these recommended tests are listed in the plan section. The patient has been provided with a written plan.    Health Maintenance   Topic Date Due   • MAMMOGRAM  04/01/2022   • COLONOSCOPY  02/12/2025   • TDAP/TD VACCINES (3 - Td) 12/02/2028   • INFLUENZA VACCINE  Completed   • ZOSTER VACCINE  Completed          The following portions of the patient's history were reviewed and updated as appropriate: allergies, current medications, past family history, past medical history, past social history, past surgical history and problem list.    Outpatient Medications Prior to Visit   Medication Sig Dispense Refill   • aspirin 81 MG EC tablet Take 81 mg by mouth Daily.     • atenolol (TENORMIN) 25 MG tablet Take 1 tablet by mouth Daily. 90 tablet 3   • citalopram (CeleXA) 10 MG tablet TAKE 1 TABLET EVERY MORNING 90 tablet 3   • vitamin D (ERGOCALCIFEROL) 1.25 MG (62040 UT) capsule capsule TAKE 1 CAPSULE TWICE A WEEK (Patient taking  differently: 50,000 Units Every 7 (Seven) Days.) 24 capsule 3   • brompheniramine-pseudoephedrine-DM 30-2-10 MG/5ML syrup Take 5 mL by mouth 4 (Four) Times a Day As Needed for Congestion, Cough or Allergies. 118 mL 0     No facility-administered medications prior to visit.        Patient Active Problem List   Diagnosis   • Gastroesophageal reflux disease   • Heartburn   • Esophageal spasm   • Benign hypertension   • GILL (obstructive sleep apnea)   • Vitamin D deficiency   • Arthritis of knee   • Benign neoplasm of ovary   • Breast cyst, right   • Closed fracture of lumbar vertebra without spinal cord injury (CMS/HCC)   • Degeneration of cervicothoracic intervertebral disc   • Glaucoma   • Gluten intolerance   • Insomnia   • Menopausal depression   • Midline cystocele   • Mitral valve prolapse   • Cervicalgia   • Postmenopausal status   • Proteinuria   • Rectocele   • Uterine prolapse   • Benign paroxysmal positional vertigo       Advanced Care Planning:  ACP discussion was held with the patient during this visit. Patient has an advance directive in EMR which is still valid.  Patient does not have an advance directive, information provided.    Review of Systems   Constitutional: Negative for activity change, appetite change and chills.   HENT: Negative for congestion, ear pain and facial swelling.    Eyes: Negative for pain, discharge and itching.   Respiratory: Negative for apnea, cough and shortness of breath.    Cardiovascular: Negative for chest pain, palpitations and leg swelling.   Gastrointestinal: Negative for abdominal distention, abdominal pain and anal bleeding.   Endocrine: Negative for cold intolerance and heat intolerance.   Genitourinary: Negative for difficulty urinating, dysuria and flank pain.   Musculoskeletal: Negative for arthralgias, back pain and joint swelling.   Skin: Negative for color change, pallor and rash.   Allergic/Immunologic: Negative for environmental allergies and food allergies.  "  Neurological: Negative for dizziness and facial asymmetry.   Hematological: Negative for adenopathy. Does not bruise/bleed easily.   Psychiatric/Behavioral: Negative for agitation, behavioral problems and confusion.       Compared to one year ago, the patient feels her physical health is the same.  Compared to one year ago, the patient feels her mental health is the same.    Reviewed chart for potential of high risk medication in the elderly: yes  Reviewed chart for potential of harmful drug interactions in the elderly:yes    Objective         Vitals:    02/03/21 1043   BP: 138/70   BP Location: Left arm   Pulse: 78   Temp: 97.8 °F (36.6 °C)   SpO2: 98%   Weight: 92.1 kg (203 lb)   Height: 170.2 cm (67\")   PainSc: 0-No pain       Body mass index is 31.79 kg/m².  Discussed the patient's BMI with her. The BMI is above average; BMI management plan is completed.    Physical Exam  Constitutional:       Appearance: Normal appearance. She is well-developed.   HENT:      Head: Normocephalic and atraumatic.      Right Ear: External ear normal.      Left Ear: External ear normal.   Eyes:      Extraocular Movements: Extraocular movements intact.      Conjunctiva/sclera: Conjunctivae normal.      Pupils: Pupils are equal, round, and reactive to light.   Neck:      Musculoskeletal: Normal range of motion and neck supple. No neck rigidity.      Vascular: No carotid bruit.   Cardiovascular:      Rate and Rhythm: Normal rate and regular rhythm.      Pulses: Normal pulses.      Heart sounds: Normal heart sounds. No murmur. No gallop.    Pulmonary:      Effort: Pulmonary effort is normal.      Breath sounds: Normal breath sounds.   Chest:      Breasts:         Right: Normal.         Left: Normal.   Genitourinary:     Pubic Area: No rash.       Labia:         Right: No rash.         Left: No rash.       Urethra: No prolapse or urethral swelling.      Vagina: Normal.      Cervix: Normal.      Uterus: Normal.       Adnexa: Right " adnexa normal and left adnexa normal.      Rectum: Normal.   Musculoskeletal: Normal range of motion.         General: No swelling or tenderness.   Skin:     General: Skin is warm and dry.   Neurological:      General: No focal deficit present.      Mental Status: She is alert and oriented to person, place, and time.   Psychiatric:         Mood and Affect: Mood normal.         Behavior: Behavior normal.               Assessment/Plan   Medicare Risks and Personalized Health Plan  CMS Preventative Services Quick Reference  Advance Directive Discussion  Breast Cancer/Mammogram Screening  Cardiovascular risk  Immunizations Discussed/Encouraged (specific immunizations; Pneumococcal 23 )  Osteoprorosis Risk    The above risks/problems have been discussed with the patient.  Pertinent information has been shared with the patient in the After Visit Summary.  Follow up plans and orders are seen below in the Assessment/Plan Section.    Diagnoses and all orders for this visit:    1. Benign hypertension (Primary)  -     CBC & Differential  -     Comprehensive Metabolic Panel  -     Urinalysis With Microscopic - Urine, Clean Catch  -     TSH  -     Lipid Panel  -     Uric Acid    2. Vitamin D deficiency  -     Vitamin D 25 Hydroxy    3. Need for hepatitis C screening test  -     Hepatitis C Antibody      Follow Up:  No follow-ups on file.     An After Visit Summary and PPPS were given to the patient.

## 2021-02-04 DIAGNOSIS — I10 BENIGN HYPERTENSION: Primary | ICD-10-CM

## 2021-02-05 LAB
25(OH)D3+25(OH)D2 SERPL-MCNC: 49.2 NG/ML (ref 30–100)
ABO GROUP BLD: NORMAL
ALBUMIN SERPL-MCNC: 4.1 G/DL (ref 3.5–5.2)
ALBUMIN/GLOB SERPL: 1.7 G/DL
ALP SERPL-CCNC: 102 U/L (ref 39–117)
ALT SERPL-CCNC: 17 U/L (ref 1–33)
APPEARANCE UR: CLEAR
AST SERPL-CCNC: 19 U/L (ref 1–32)
BACTERIA #/AREA URNS HPF: ABNORMAL /HPF
BASOPHILS # BLD AUTO: 0.02 10*3/MM3 (ref 0–0.2)
BASOPHILS NFR BLD AUTO: 0.3 % (ref 0–1.5)
BILIRUB SERPL-MCNC: 0.5 MG/DL (ref 0–1.2)
BILIRUB UR QL STRIP: NEGATIVE
BUN SERPL-MCNC: 23 MG/DL (ref 8–23)
BUN/CREAT SERPL: 33.8 (ref 7–25)
CALCIUM SERPL-MCNC: 9.4 MG/DL (ref 8.6–10.5)
CASTS URNS MICRO: ABNORMAL
CHLORIDE SERPL-SCNC: 106 MMOL/L (ref 98–107)
CHOLEST SERPL-MCNC: 186 MG/DL (ref 0–200)
CO2 SERPL-SCNC: 24 MMOL/L (ref 22–29)
COLOR UR: YELLOW
CREAT SERPL-MCNC: 0.68 MG/DL (ref 0.57–1)
EOSINOPHIL # BLD AUTO: 0.24 10*3/MM3 (ref 0–0.4)
EOSINOPHIL NFR BLD AUTO: 4 % (ref 0.3–6.2)
EPI CELLS #/AREA URNS HPF: ABNORMAL /HPF
ERYTHROCYTE [DISTWIDTH] IN BLOOD BY AUTOMATED COUNT: 12.9 % (ref 12.3–15.4)
GLOBULIN SER CALC-MCNC: 2.4 GM/DL
GLUCOSE SERPL-MCNC: 97 MG/DL (ref 65–99)
GLUCOSE UR QL: NEGATIVE
HCT VFR BLD AUTO: 41 % (ref 34–46.6)
HCV AB S/CO SERPL IA: <0.1 S/CO RATIO (ref 0–0.9)
HDLC SERPL-MCNC: 47 MG/DL (ref 40–60)
HGB BLD-MCNC: 13.5 G/DL (ref 12–15.9)
HGB UR QL STRIP: ABNORMAL
IMM GRANULOCYTES # BLD AUTO: 0.03 10*3/MM3 (ref 0–0.05)
IMM GRANULOCYTES NFR BLD AUTO: 0.5 % (ref 0–0.5)
KETONES UR QL STRIP: NEGATIVE
LDLC SERPL CALC-MCNC: 116 MG/DL (ref 0–100)
LEUKOCYTE ESTERASE UR QL STRIP: NEGATIVE
LYMPHOCYTES # BLD AUTO: 1.25 10*3/MM3 (ref 0.7–3.1)
LYMPHOCYTES NFR BLD AUTO: 21 % (ref 19.6–45.3)
MCH RBC QN AUTO: 28.4 PG (ref 26.6–33)
MCHC RBC AUTO-ENTMCNC: 32.9 G/DL (ref 31.5–35.7)
MCV RBC AUTO: 86.1 FL (ref 79–97)
MONOCYTES # BLD AUTO: 0.66 10*3/MM3 (ref 0.1–0.9)
MONOCYTES NFR BLD AUTO: 11.1 % (ref 5–12)
NEUTROPHILS # BLD AUTO: 3.75 10*3/MM3 (ref 1.7–7)
NEUTROPHILS NFR BLD AUTO: 63.1 % (ref 42.7–76)
NITRITE UR QL STRIP: NEGATIVE
NRBC BLD AUTO-RTO: 0 /100 WBC (ref 0–0.2)
PH UR STRIP: 5.5 [PH] (ref 5–8)
PLATELET # BLD AUTO: 219 10*3/MM3 (ref 140–450)
POTASSIUM SERPL-SCNC: 4.2 MMOL/L (ref 3.5–5.2)
PROT SERPL-MCNC: 6.5 G/DL (ref 6–8.5)
PROT UR QL STRIP: NEGATIVE
RBC # BLD AUTO: 4.76 10*6/MM3 (ref 3.77–5.28)
RBC #/AREA URNS HPF: ABNORMAL /HPF
RH BLD: NEGATIVE
SODIUM SERPL-SCNC: 140 MMOL/L (ref 136–145)
SP GR UR: 1.02 (ref 1–1.03)
TRIGL SERPL-MCNC: 131 MG/DL (ref 0–150)
TSH SERPL DL<=0.005 MIU/L-ACNC: 1.54 UIU/ML (ref 0.27–4.2)
URATE SERPL-MCNC: 5.4 MG/DL (ref 2.4–5.7)
UROBILINOGEN UR STRIP-MCNC: ABNORMAL MG/DL
VLDLC SERPL CALC-MCNC: 23 MG/DL (ref 5–40)
WBC # BLD AUTO: 5.95 10*3/MM3 (ref 3.4–10.8)
WBC #/AREA URNS HPF: ABNORMAL /HPF

## 2021-02-12 ENCOUNTER — TELEPHONE (OUTPATIENT)
Dept: INTERNAL MEDICINE | Facility: CLINIC | Age: 76
End: 2021-02-12

## 2021-02-12 ENCOUNTER — OFFICE VISIT (OUTPATIENT)
Dept: INTERNAL MEDICINE | Facility: CLINIC | Age: 76
End: 2021-02-12

## 2021-02-12 ENCOUNTER — APPOINTMENT (OUTPATIENT)
Dept: VACCINE CLINIC | Facility: HOSPITAL | Age: 76
End: 2021-02-12

## 2021-02-12 VITALS — BODY MASS INDEX: 31.86 KG/M2 | HEIGHT: 67 IN | TEMPERATURE: 98 F | WEIGHT: 203 LBS

## 2021-02-12 DIAGNOSIS — J40 BRONCHITIS: Primary | ICD-10-CM

## 2021-02-12 DIAGNOSIS — J40 BRONCHITIS: ICD-10-CM

## 2021-02-12 PROCEDURE — 99442 PR PHYS/QHP TELEPHONE EVALUATION 11-20 MIN: CPT | Performed by: NURSE PRACTITIONER

## 2021-02-12 RX ORDER — CETIRIZINE HYDROCHLORIDE 10 MG/1
10 TABLET ORAL DAILY
COMMUNITY
End: 2021-06-15

## 2021-02-12 RX ORDER — METHYLPREDNISOLONE 4 MG/1
TABLET ORAL
Qty: 1 EACH | Refills: 0 | Status: SHIPPED | OUTPATIENT
Start: 2021-02-12 | End: 2021-02-12 | Stop reason: SDUPTHER

## 2021-02-12 RX ORDER — AZITHROMYCIN 250 MG/1
TABLET, FILM COATED ORAL
Qty: 6 TABLET | Refills: 0 | Status: SHIPPED | OUTPATIENT
Start: 2021-02-12 | End: 2021-02-12 | Stop reason: SDUPTHER

## 2021-02-12 RX ORDER — METHYLPREDNISOLONE 4 MG/1
TABLET ORAL
Qty: 1 EACH | Refills: 0 | Status: SHIPPED | OUTPATIENT
Start: 2021-02-12 | End: 2021-06-15

## 2021-02-12 RX ORDER — ALBUTEROL SULFATE 90 UG/1
2 AEROSOL, METERED RESPIRATORY (INHALATION) EVERY 4 HOURS PRN
Qty: 18 G | Refills: 0 | Status: SHIPPED | OUTPATIENT
Start: 2021-02-12 | End: 2021-06-15

## 2021-02-12 RX ORDER — ALBUTEROL SULFATE 90 UG/1
2 AEROSOL, METERED RESPIRATORY (INHALATION) EVERY 4 HOURS PRN
Qty: 18 G | Refills: 0 | Status: SHIPPED | OUTPATIENT
Start: 2021-02-12 | End: 2021-02-12 | Stop reason: SDUPTHER

## 2021-02-12 RX ORDER — AZITHROMYCIN 250 MG/1
TABLET, FILM COATED ORAL
Qty: 6 TABLET | Refills: 0 | Status: SHIPPED | OUTPATIENT
Start: 2021-02-12 | End: 2021-06-15

## 2021-02-12 NOTE — TELEPHONE ENCOUNTER
PATIENT NEEDED TO SPEAK WITH CLINICAL STAFF IN ORDER TO GO OVER HER MEDICATION LIST.     PLEASE CONTACT AND ADVISE

## 2021-02-12 NOTE — TELEPHONE ENCOUNTER
Caller: Holli Iyer    Relationship: Self    Best call back number: [phone]     What medication are you requesting: INHALER    What are your current symptoms: COUGHING, WHEEZING, WHISTLING, CONGESTED    How long have you been experiencing symptoms: A COUPLE OF WEEKS    Have you had these symptoms before:    [] Yes  [x] No    Have you been treated for these symptoms before:   [] Yes  [x] No    If a prescription is needed, what is your preferred pharmacy and phone number: [id number] [place] [facility] [address] [place] [facility] [address]  [phone] [facility] [phone] FX     Additional notes:  PATIENT REQUESTING CALLBACK TO DISCUSS WHETHER OR NOT THIS WILL BE SENT IN.

## 2021-02-12 NOTE — PROGRESS NOTES
"Subjective   Holli Iyer is a 75 y.o. female.   Chief Complaint   Patient presents with   • Cough     coughing, wheezing, sinus drainage zyrtec, no nasal spray      You have chosen to receive care through a telephone visit. Do you consent to use a telephone visit for your medical care today? Yes    Cough  This is a new problem. The current episode started 1 to 4 weeks ago (2 weeks). The problem has been gradually worsening. Cough characteristics: no longer productive. Associated symptoms include nasal congestion (has improved some), rhinorrhea (Has improved some) and wheezing (Whistling after exhale). Pertinent negatives include no chest pain, fever, rash, shortness of breath or weight loss. Associated symptoms comments: \"bronchial cough\"  . Treatments tried: Zyrtec x 1 week. The treatment provided mild relief. Her past medical history is significant for bronchitis. There is no history of COPD.        The following portions of the patient's history were reviewed and updated as appropriate: allergies, current medications, past family history, past medical history, past social history, past surgical history and problem list.    Review of Systems   Constitutional: Negative for activity change, appetite change, fatigue, fever, unexpected weight gain and unexpected weight loss.   HENT: Positive for congestion and rhinorrhea (Has improved some). Negative for swollen glands, trouble swallowing and voice change.    Eyes: Negative for blurred vision and visual disturbance.   Respiratory: Positive for cough and wheezing (Whistling after exhale). Negative for shortness of breath.    Cardiovascular: Negative for chest pain, palpitations and leg swelling.   Gastrointestinal: Negative for abdominal pain, constipation, diarrhea, nausea, vomiting and indigestion.   Endocrine: Negative for cold intolerance, heat intolerance, polydipsia and polyphagia.   Genitourinary: Negative for dysuria and frequency.   Musculoskeletal: Negative " for arthralgias, back pain, joint swelling and neck pain.   Skin: Negative for color change, rash and skin lesions.   Neurological: Negative for dizziness, weakness, headache, memory problem and confusion.   Hematological: Does not bruise/bleed easily.   Psychiatric/Behavioral: Negative for agitation, hallucinations and suicidal ideas. The patient is not nervous/anxious.        Objective   Past Medical History:   Diagnosis Date   • Colon polyp    • GERD (gastroesophageal reflux disease)    • Insomnia    • Irregular heart beat    • MVP (mitral valve prolapse)    • Sleep apnea       Past Surgical History:   Procedure Laterality Date   • BREAST BIOPSY Left    • COLONOSCOPY  02/12/2015   • ENDOSCOPY N/A 10/26/2017    Procedure: ESOPHAGOGASTRODUODENOSCOPY WITH ANESTHESIA;  Surgeon: Selena Thomas MD;  Location: [facility] ENDOSCOPY;  Service:         Current Outpatient Medications:   •  aspirin 81 MG EC tablet, Take 81 mg by mouth Daily., Disp: , Rfl:   •  atenolol (TENORMIN) 25 MG tablet, Take 1 tablet by mouth Daily., Disp: 90 tablet, Rfl: 3  •  cetirizine (zyrTEC) 10 MG tablet, Take 10 mg by mouth Daily., Disp: , Rfl:   •  citalopram (CeleXA) 10 MG tablet, TAKE 1 TABLET EVERY MORNING, Disp: 90 tablet, Rfl: 3  •  vitamin D (ERGOCALCIFEROL) 1.25 MG (32516 UT) capsule capsule, TAKE 1 CAPSULE TWICE A WEEK (Patient taking differently: 50,000 Units Every 7 (Seven) Days.), Disp: 24 capsule, Rfl: 3  •  albuterol sulfate  (90 Base) MCG/ACT inhaler, Inhale 2 puffs Every 4 (Four) Hours As Needed for Wheezing., Disp: 18 g, Rfl: 0  •  azithromycin (Zithromax Z-Moses) 250 MG tablet, Take 2 tablets the first day, then 1 tablet daily for 4 days., Disp: 6 tablet, Rfl: 0  •  methylPREDNISolone (MEDROL) 4 MG dose pack, Take as directed on package instructions., Disp: 1 each, Rfl: 0     Vitals:    02/12/21 1056   Temp: 98 °F (36.7 °C)         02/12/21  1056   Weight: 92.1 kg (203 lb)           Physical Exam    No physical exam-  telephone visit       Assessment/Plan   Diagnoses and all orders for this visit:    1. Bronchitis (Primary)  -    azithromycin (Zithromax Z-Moses) 250 MG tablet; Take 2 tablets the first day, then 1 tablet daily for 4 days.  Dispense: 6 tablet; Refill: 0  -    methylPREDNISolone (MEDROL) 4 MG dose pack; Take as directed on package instructions.  Dispense: 1 each; Refill: 0  -    albuterol sulfate  (90 Base) MCG/ACT inhaler; Inhale 2 puffs Every 4 (Four) Hours As Needed for Wheezing.  Dispense: 18 g; Refill: 0    This visit has been rescheduled as a phone visit to comply with patient safety concerns in accordance with CDC recommendations. Total time of discussion was 11-20  minutes.    Holli has had these symptoms for 2+ weeks.  I do not feel it is necessary to test for COVID at this time.  Her symptoms sound suggestive of an acute bronchitis.  She mentions she was told in the past that she may have an asthmatic bronchitis.  I will cover today with antibiotic, medrol dose pack, and PRN albuterol.  She has concerns if these medications would interact with planned COVID vaccine.  I have instructed to discuss with intake just prior to her vaccine, but that if she wanted to hold on her steroid pack and just use the antibiotic and albuterol that this would be appropriate.  If no improvement will need follow up.

## 2021-02-12 NOTE — TELEPHONE ENCOUNTER
Caller: Shireen Holli J    Relationship: Self    Best call back number: [phone]    Medication needed:   Requested Prescriptions     Pending Prescriptions Disp Refills   • albuterol sulfate  (90 Base) MCG/ACT inhaler 18 g 0     Sig: Inhale 2 puffs Every 4 (Four) Hours As Needed for Wheezing.   • azithromycin (Zithromax Z-Moses) 250 MG tablet 6 tablet 0     Sig: Take 2 tablets the first day, then 1 tablet daily for 4 days.   • methylPREDNISolone (MEDROL) 4 MG dose pack 1 each 0     Sig: Take as directed on package instructions.       When do you need the refill by: 2/12/21    What details did the patient provide when requesting the medication: MEDICATIONS WERE SENT TO THE WRONG PHARMACY    Does the patient have less than a 3 day supply:  [x] Yes  [] No    What is the patient's preferred pharmacy: [id number] - [place], [place] - [address] [facility] [place] [address]  [phone] [facility] [phone] FX

## 2021-03-12 ENCOUNTER — APPOINTMENT (OUTPATIENT)
Dept: VACCINE CLINIC | Facility: HOSPITAL | Age: 76
End: 2021-03-12

## 2021-03-31 ENCOUNTER — TELEPHONE (OUTPATIENT)
Dept: INTERNAL MEDICINE | Facility: CLINIC | Age: 76
End: 2021-03-31

## 2021-03-31 RX ORDER — ATENOLOL 25 MG/1
TABLET ORAL
Qty: 90 TABLET | Refills: 3 | Status: SHIPPED | OUTPATIENT
Start: 2021-03-31 | End: 2021-10-25 | Stop reason: SDUPTHER

## 2021-03-31 NOTE — TELEPHONE ENCOUNTER
That is correct, she is not due to come back until 6 mos for FU from her February appt with Dr. Sanchez.

## 2021-03-31 NOTE — TELEPHONE ENCOUNTER
PATIENT CALLED IN REQUESTING A CALL BACK TO DISCUSS WHETHER OR NOT SHE NEEDS TO KEEP HER APPOINTMENT ON 4/7/2021   PATIENT FEELS AS THOUGH SHE JUST HAD HER PHYSICAL AND PAP IN February     PLEASE CALL BACK AND ADVISE   [phone]

## 2021-05-11 ENCOUNTER — TELEPHONE (OUTPATIENT)
Dept: INTERNAL MEDICINE | Facility: CLINIC | Age: 76
End: 2021-05-11

## 2021-05-11 DIAGNOSIS — M79.676 PAIN AROUND TOENAIL: Primary | ICD-10-CM

## 2021-05-11 NOTE — TELEPHONE ENCOUNTER
Called pt - she called Dr. Raymond's office and they told her she would need a referral.  She is c/o her left 2nd toenail curling under on the end and she is having trouble trimming it.  She has not had this issue addressed here before.  Can we refer her or will she need to be seen here first?

## 2021-05-11 NOTE — TELEPHONE ENCOUNTER
PATIENT HAS CALLED REQUESTING A REFERRAL TO [facility]. PATIENT MENTIONED HER TOENAIL WAS CAUSING HER A LOT OF TROUBLE.    PLEASE CALL AND ADVISE: [phone]

## 2021-06-14 ENCOUNTER — TELEPHONE (OUTPATIENT)
Dept: PODIATRY | Facility: CLINIC | Age: 76
End: 2021-06-14

## 2021-06-14 NOTE — TELEPHONE ENCOUNTER
Called patient regarding appt on 06/15/2021. Left message for patient to return call if any questions or concerns arise.

## 2021-06-15 ENCOUNTER — OFFICE VISIT (OUTPATIENT)
Dept: PODIATRY | Facility: CLINIC | Age: 76
End: 2021-06-15

## 2021-06-15 VITALS
WEIGHT: 199.2 LBS | DIASTOLIC BLOOD PRESSURE: 62 MMHG | HEIGHT: 67 IN | SYSTOLIC BLOOD PRESSURE: 108 MMHG | HEART RATE: 60 BPM | OXYGEN SATURATION: 98 % | BODY MASS INDEX: 31.27 KG/M2

## 2021-06-15 DIAGNOSIS — M79.672 FOOT PAIN, LEFT: ICD-10-CM

## 2021-06-15 DIAGNOSIS — M20.12 HALLUX VALGUS, LEFT: ICD-10-CM

## 2021-06-15 DIAGNOSIS — M20.41 HAMMER TOES OF BOTH FEET: ICD-10-CM

## 2021-06-15 DIAGNOSIS — M20.42 HAMMER TOES OF BOTH FEET: ICD-10-CM

## 2021-06-15 DIAGNOSIS — L60.0 INGROWN TOENAIL: Primary | ICD-10-CM

## 2021-06-15 DIAGNOSIS — M20.11 HALLUX VALGUS, RIGHT: ICD-10-CM

## 2021-06-15 PROCEDURE — 99203 OFFICE O/P NEW LOW 30 MIN: CPT | Performed by: PODIATRIST

## 2021-06-15 PROCEDURE — 11720 DEBRIDE NAIL 1-5: CPT | Performed by: PODIATRIST

## 2021-08-17 ENCOUNTER — OFFICE VISIT (OUTPATIENT)
Dept: INTERNAL MEDICINE | Facility: CLINIC | Age: 76
End: 2021-08-17

## 2021-08-17 VITALS
TEMPERATURE: 97.8 F | BODY MASS INDEX: 30.01 KG/M2 | HEIGHT: 68 IN | DIASTOLIC BLOOD PRESSURE: 60 MMHG | HEART RATE: 73 BPM | WEIGHT: 198 LBS | OXYGEN SATURATION: 98 % | SYSTOLIC BLOOD PRESSURE: 124 MMHG

## 2021-08-17 DIAGNOSIS — F41.9 ANXIETY: ICD-10-CM

## 2021-08-17 DIAGNOSIS — M70.51 BURSITIS OF BOTH KNEES, UNSPECIFIED BURSA: Primary | ICD-10-CM

## 2021-08-17 DIAGNOSIS — M19.041 PRIMARY OSTEOARTHRITIS OF BOTH HANDS: ICD-10-CM

## 2021-08-17 DIAGNOSIS — I10 BENIGN HYPERTENSION: ICD-10-CM

## 2021-08-17 DIAGNOSIS — M70.52 BURSITIS OF BOTH KNEES, UNSPECIFIED BURSA: Primary | ICD-10-CM

## 2021-08-17 DIAGNOSIS — M19.042 PRIMARY OSTEOARTHRITIS OF BOTH HANDS: ICD-10-CM

## 2021-08-17 PROCEDURE — 99213 OFFICE O/P EST LOW 20 MIN: CPT | Performed by: INTERNAL MEDICINE

## 2021-08-17 NOTE — PROGRESS NOTES
Subjective   Holli Iyer is a 76 y.o. female.   Chief Complaint   Patient presents with   • Hypertension     6 month follow up    • Arthritis     pt would like to discuss possible arthritis        History of Present Illness patient has hypertension and what appears to be degenerative arthritis of her hands.  She is having quite a bit of pain in her hands at today's visit.  Taking all of her medications as prescribed her anxiety seems to be doing well no problems.    The following portions of the patient's history were reviewed and updated as appropriate: allergies, current medications, past family history, past medical history, past social history, past surgical history and problem list.    Review of Systems   Constitutional: Negative for activity change, appetite change, fatigue, fever, unexpected weight gain and unexpected weight loss.   HENT: Negative for swollen glands, trouble swallowing and voice change.    Eyes: Negative for blurred vision and visual disturbance.   Respiratory: Negative for cough and shortness of breath.    Cardiovascular: Negative for chest pain, palpitations and leg swelling.   Gastrointestinal: Negative for abdominal pain, constipation, diarrhea, nausea, vomiting and indigestion.   Endocrine: Negative for cold intolerance, heat intolerance, polydipsia and polyphagia.   Genitourinary: Negative for dysuria and frequency.   Musculoskeletal: Positive for arthralgias ( Hand). Negative for back pain, joint swelling and neck pain.   Skin: Negative for color change, rash and skin lesions.   Neurological: Negative for dizziness, weakness, headache, memory problem and confusion.   Hematological: Does not bruise/bleed easily.   Psychiatric/Behavioral: Negative for agitation, hallucinations and suicidal ideas. The patient is not nervous/anxious.        Objective   Past Medical History:   Diagnosis Date   • Colon polyp    • GERD (gastroesophageal reflux disease)    • Hyperthyroidism     pt states past  issue    • Insomnia    • Irregular heart beat    • MVP (mitral valve prolapse)    • Sleep apnea       Past Surgical History:   Procedure Laterality Date   • BREAST BIOPSY Left    • COLONOSCOPY  02/12/2015   • ENDOSCOPY N/A 10/26/2017    Procedure: ESOPHAGOGASTRODUODENOSCOPY WITH ANESTHESIA;  Surgeon: Selena Thomas MD;  Location: [facility] ENDOSCOPY;  Service:         Current Outpatient Medications:   •  aspirin 81 MG EC tablet, Take 81 mg by mouth Daily., Disp: , Rfl:   •  atenolol (TENORMIN) 25 MG tablet, TAKE 1 TABLET DAILY, Disp: 90 tablet, Rfl: 3  •  citalopram (CeleXA) 10 MG tablet, TAKE 1 TABLET EVERY MORNING, Disp: 90 tablet, Rfl: 3  •  vitamin D (ERGOCALCIFEROL) 1.25 MG (71994 UT) capsule capsule, TAKE 1 CAPSULE TWICE A WEEK (Patient taking differently: 50,000 Units Every 7 (Seven) Days.), Disp: 24 capsule, Rfl: 3     Vitals:    08/17/21 1339   BP: 124/60   Pulse: 73   Temp: 97.8 °F (36.6 °C)   SpO2: 98%         08/17/21  1339   Weight: 89.8 kg (198 lb)         Physical Exam  Constitutional:       Appearance: She is well-developed.   HENT:      Head: Normocephalic and atraumatic.   Eyes:      Pupils: Pupils are equal, round, and reactive to light.   Cardiovascular:      Rate and Rhythm: Normal rate and regular rhythm.   Pulmonary:      Effort: Pulmonary effort is normal.      Breath sounds: Normal breath sounds.   Abdominal:      General: Bowel sounds are normal.      Palpations: Abdomen is soft.   Musculoskeletal:         General: Deformity ( DIP and PIP nodularity both hands) present. Normal range of motion.      Cervical back: Normal range of motion and neck supple.   Skin:     General: Skin is warm and dry.   Neurological:      Mental Status: She is alert and oriented to person, place, and time.   Psychiatric:         Behavior: Behavior normal.               Assessment/Plan   Diagnoses and all orders for this visit:    1. Bursitis of both knees, unspecified bursa (Primary)  -     Ambulatory Referral to  Physical Therapy Evaluate and treat    2. Primary osteoarthritis of both hands    3. Benign hypertension    4. Anxiety    At today's visit patient is taking her medication as prescribed we spent time talking about her arthritis and potential prescription medications.  We decided to try her on Aleve in the evenings to see how she does with that Tylenol apparently also helps with her arthritis pain.  Her symptoms are primarily on her DIP and PIP joints both hands.  Patient's blood pressure is well controlled I am not changing her medication her anxiety is responding to citalopram.

## 2021-08-30 ENCOUNTER — TREATMENT (OUTPATIENT)
Dept: PHYSICAL THERAPY | Facility: CLINIC | Age: 76
End: 2021-08-30

## 2021-08-30 DIAGNOSIS — Z74.09 IMPAIRED MOBILITY: ICD-10-CM

## 2021-08-30 DIAGNOSIS — G89.29 CHRONIC PAIN OF BOTH KNEES: Primary | ICD-10-CM

## 2021-08-30 DIAGNOSIS — M25.562 CHRONIC PAIN OF BOTH KNEES: Primary | ICD-10-CM

## 2021-08-30 DIAGNOSIS — M70.50 PES ANSERINE BURSITIS: ICD-10-CM

## 2021-08-30 DIAGNOSIS — M25.561 CHRONIC PAIN OF BOTH KNEES: Primary | ICD-10-CM

## 2021-08-30 PROCEDURE — 97161 PT EVAL LOW COMPLEX 20 MIN: CPT

## 2021-08-30 NOTE — PROGRESS NOTES
I have reviewed the notes, assessments, and/or procedures performed by Shaina Metz PT, DPT, I concur with her/his documentation of Holli Iyer.

## 2021-08-30 NOTE — PROGRESS NOTES
Physical Therapy Therapy Initial Evaluation and Plan of Care    Patient: Holli Iyer               : 1945  Visit Date: 2021  Referring practitioner: Gavino Sanchez MD  Date of Initial Visit: 2021  Patient seen for 1 sessions    Visit Diagnoses:    ICD-10-CM ICD-9-CM   1. Chronic pain of both knees  M25.561 719.46    M25.562 338.29    G89.29    2. Pes anserine bursitis  M70.50 726.61   3. Impaired mobility  Z74.09 799.89     Past Medical History:   Diagnosis Date   • Colon polyp    • GERD (gastroesophageal reflux disease)    • Hyperthyroidism     pt states past issue    • Insomnia    • Irregular heart beat    • MVP (mitral valve prolapse)    • Sleep apnea      Past Surgical History:   Procedure Laterality Date   • BREAST BIOPSY Left    • COLONOSCOPY  2015   • ENDOSCOPY N/A 10/26/2017    Procedure: ESOPHAGOGASTRODUODENOSCOPY WITH ANESTHESIA;  Surgeon: Selena Thomas MD;  Location: [facility] ENDOSCOPY;  Service:          SUBJECTIVE     Subjective Evaluation    History of Present Illness    Subjective comment: Patient was diagnosed with bilateral bursitis 2 years ago, however the pain has been going on for 4 years. She has also been told it could be osteoarthritis. On her L leg, she has a large varicose vein that gives her pain. She does have history of falling, it occurred when catching her L toe and falling. Last fall was 2 years ago. She states that she is careful when she walks as she feels she would be unable to catch herself.    Patient Occupation: Retired  Pain  Current pain ratin  At best pain ratin  At worst pain ratin  Location: B global knees, however more pronounced inferior medial   Quality: dull ache and sharp  Relieving factors: ice (Sitting/unweighting legs)  Aggravating factors: standing, ambulation, stairs and squatting    Social Support  Lives in: one-story house (2 steps to enter/exit)  Lives with: significant other    Hand dominance: right    Diagnostic  Tests  X-ray: normal (reports most recent x-ray showed no structural changes in knee, per patient)    Treatments  Previous treatment: injection treatment and physical therapy (2 years ago)  Patient Goals  Patient goals for therapy: decreased pain  Patient goal: Increase walking without pain       Outcome Measure:   PT G-Codes  Outcome Measure Options: Lower Extremity Functional Scale (LEFS)  Total: 43    OBJECTIVE     Objective          Tenderness   Left Knee   Tenderness in the medial joint line. No tenderness in the inferior patella, lateral patella, medial patella and patellar tendon.     Right Knee   Tenderness in the medial joint line. No tenderness in the inferior patella, lateral patella, medial patella and patellar tendon.     Neurological Testing     Sensation     Knee   Left Knee   Intact: light touch    Right Knee   Intact: light touch     Reflexes   Left   Patellar (L4): trace (1+)  Achilles (S1): trace (1+)    Right   Patellar (L4): trace (1+)  Achilles (S1): trace (1+)    Passive Range of Motion   Left Hip   Flexion: WFL  Extension: WFL  External rotation (90/90): WFL  Internal rotation (90/90): 12 degrees     Right Hip   Flexion: WFL  Extension: WFL  External rotation (90/90): WFL  Internal rotation (90/90): 10 degrees     Patellar Mobility   Left Knee Hypomobile in the left medial, left lateral, left superior and left inferior patellar tendon(s).     Right Knee Hypomobile in the medial, lateral, superior and inferior patellar tendon(s).     Additional Patellar Mobility Details  L more hypomobile compared to R     Patellar Static Positioning   Left Knee: lateral tilt  Right Knee: lateral tilt    Strength/Myotome Testing     Left Hip   Planes of Motion   Flexion: 4+  Extension: 4  Abduction: 4+    Isolated Muscles   Gluteus gillian: 4    Right Hip   Planes of Motion   Flexion: 4+  Extension: 4-  Abduction: 4    Isolated Muscles   Gluteus maximums: 4-    Left Knee   Flexion: 5  Extension: 5    Right  Knee   Flexion: 5  Extension: 4+    Left Ankle/Foot   Dorsiflexion: 5  Plantar flexion: 5  Great toe extension: 5    Right Ankle/Foot   Dorsiflexion: 5  Plantar flexion: 5  Great toe extension: 5    Tests     Left Knee   Negative anterior drawer, patellar compression, posterior drawer, valgus stress test at 0 degrees, valgus stress test at 30 degrees, varus stress test at 0 degrees and varus stress test at 30 degrees.     Right Knee   Negative anterior drawer, patellar compression, posterior drawer, valgus stress test at 0 degrees, valgus stress test at 30 degrees, varus stress test at 0 degrees and varus stress test at 30 degrees.     Ambulation     Observational Gait   Walking speed and stride length within functional limits.     Additional Observational Gait Details  Steps down harder with L heel during heel strike compared to R     Comments   R SLS 12 sec  L SLS 9 sec     Tandem standing: 15 seconds bilaterally      General Comments     Hip Comments   LLE noted to be longer than RLE today      Therapy Education/Self Care [id number]   Details: Educated patient on anatomy of knee joint, physical therapy plan of care and HEP    Given Home Exercise Program  [facility] HEP (access code [email or web address])   Progress: New   Education provided to:  Patient   Level of understanding Verbalized and Demonstrated   Timed Minutes      Access Code: [email or web address]  URL: [email or web address]  Date: 08/30/2021  Prepared by: Catracho Salomon    Exercises  Clam with Resistance - 1-2 x daily - 7 x weekly - 2 sets - 10 reps  Prone Hip Extension - 1-2 x daily - 7 x weekly - 2 sets - 10 reps  Prone Quad Stretch with Strap - 1-2 x daily - 7 x weekly - 1 sets - 3 reps - 15-20 hold      Total Timed Treatment:     0   mins  Total Time of Visit:            55   mins    ASSESSMENT/PLAN     GOALS:  Goals                                          Progress Note due by 9/30/21                                                      Recert due by 11/28/21   STG by: 3  weeks  Comments Date Status   Patient will demonstrate >25 deg B hip passive IR       Patient will demonstrate WFL B patellar mobility       Patient will demonstrate ability to maintain tandem standing for >20 seconds with no swaying noted              LTG by: 6 weeks       Patient will demonstrate independence with comprehensive HEP      Patient will demonstrate 5/5 B hip strength       Patient will demonstrate 5/5 B knee strength       Patient will report worst pain being <3/10 on average for 1 week      Patient will score >55 on LEFS              Assessment & Plan     Assessment  Impairments: abnormal gait, abnormal or restricted ROM, impaired balance, impaired physical strength, lacks appropriate home exercise program and pain with function  Assessment details: Ms. Iyer presented to physical therapy today with a chief complaint of chronic bilateral knee pain that has been progressing over 4 years. Physical examination revealed decreased B hip strength, decreased B hip IR, patellar hypomobility and decreased static balance. Patient's clinical presentation is consistent with bursitis, more than likely the pes anserine given the point tenderness location on both knees. The hip tightness and weakness are likely leading to increased pressure to be placed on the knees. The patient should respond well to physical therapy and she is very motivated to participate. Ms. Iyer will benefit from skilled therapy services to address her physical deficits to improve her overall functional mobility. Thank you for this referral.   Prognosis: good  Functional Limitations: walking, uncomfortable because of pain and standing  Plan  Therapy options: will be seen for skilled physical therapy services  Planned modality interventions: dry needling, low level laser therapy, TENS, electrical stimulation/Russian stimulation, cryotherapy and thermotherapy (hydrocollator packs)  Planned therapy interventions: balance/weight-bearing  training, functional ROM exercises, gait training, home exercise program, joint mobilization, manual therapy, neuromuscular re-education, postural training, soft tissue mobilization, strengthening, stretching and therapeutic activities  Frequency: 2x week  Duration in visits: 12  Treatment plan discussed with: patient  Plan details: Initially work on hip mobility and addressing the B knee bursitis contributing factors utilizing manual therapy and appropriate modalities. Consider addressing pelvic alignment. Progress hip strengthening as patient is able. Progress balance training. [facility] HEP.         PT SIGNATURE: Shaina Metz PT, DPT       Initial Certification  Certification Period: 8/30/2021 through 11/28/2021  I certify that the therapy services are furnished while this patient is under my care.  The services outlined above are required by this patient, and will be reviewed every 90 days.     PHYSICIAN:     Gavino Sanchez MD______________________________________DATE: _________     Please sign and return via fax to [phone].   Thank you so much for letting us work with Holli. I appreciate your letting us work with your patients. If you have any questions or concerns, please don't hesitate to contact me.

## 2021-09-08 ENCOUNTER — TREATMENT (OUTPATIENT)
Dept: PHYSICAL THERAPY | Facility: CLINIC | Age: 76
End: 2021-09-08

## 2021-09-08 DIAGNOSIS — M70.50 PES ANSERINE BURSITIS: ICD-10-CM

## 2021-09-08 DIAGNOSIS — M25.561 CHRONIC PAIN OF BOTH KNEES: Primary | ICD-10-CM

## 2021-09-08 DIAGNOSIS — Z74.09 IMPAIRED MOBILITY: ICD-10-CM

## 2021-09-08 DIAGNOSIS — M25.562 CHRONIC PAIN OF BOTH KNEES: Primary | ICD-10-CM

## 2021-09-08 DIAGNOSIS — G89.29 CHRONIC PAIN OF BOTH KNEES: Primary | ICD-10-CM

## 2021-09-08 PROCEDURE — 97140 MANUAL THERAPY 1/> REGIONS: CPT | Performed by: PHYSICAL THERAPIST

## 2021-09-08 NOTE — PROGRESS NOTES
Physical Therapy Treatment Note    Patient: Holli Iyer                                                                                     Visit Date: 2021  :     1945    Referring practitioner:    Gavino Sanchez MD  Date of Initial Visit:          Type: THERAPY  Noted: 2021    Patient seen for 2 sessions    Visit Diagnoses:    ICD-10-CM ICD-9-CM   1. Chronic pain of both knees  M25.561 719.46    M25.562 338.29    G89.29    2. Pes anserine bursitis  M70.50 726.61   3. Impaired mobility  Z74.09 799.89     SUBJECTIVE     Subjective She reports her L knee bothered her for years then this summer the R started hurting, they're hurting equally on the inside. She also reports L heel pain and point tenderness.    PAIN: 4/10 when walkiing         OBJECTIVE     Objective      Manual Therapy     63589  Comments   B unilateral patellar superior and ML glides Grade 2 reclined   B unilateral TCJ A/P glides reclined   B unilateral IASTM B gastrocs  prone   R hip IR stretches with intermittent inferior lateral glides supine       Timed Minutes 45          Therapy Education/Self Care 12136   Details:    Given postural retraining  symptom relief   Progress: New   Education provided to:  Patient   Level of understanding Verbalized   Timed Minutes      Access Code: [email or web address]  URL: [email or web address]  Date: 2021  Prepared by: Catracho Salomon     Exercises  Clam with Resistance - 1-2 x daily - 7 x weekly - 2 sets - 10 reps  Prone Hip Extension - 1-2 x daily - 7 x weekly - 2 sets - 10 reps  Prone Quad Stretch with Strap - 1-2 x daily - 7 x weekly - 1 sets - 3 reps - 15-20 hold       Total Timed Treatment:     45   mins  Total Time of Visit:             45   mins         ASSESSMENT/PLAN     GOALS  Goals                                          Progress Note due by 21                                                      Recert due by 21   STG by: 3 weeks  Comments Date Status   Patient will  demonstrate >25 deg B hip passive IR          Patient will demonstrate WFL B patellar mobility          Patient will demonstrate ability to maintain tandem standing for >20 seconds with no swaying noted                     LTG by: 6 weeks          Patient will demonstrate independence with comprehensive HEP         Patient will demonstrate 5/5 B hip strength          Patient will demonstrate 5/5 B knee strength          Patient will report worst pain being <3/10 on average for 1 week         Patient will score >55 on LEFS               Assessment/Plan     ASSESSMENT: No goals have been met at this time; however, today was the first treatment session following the initial evaluation. I did assess her B DF PROM with the L resulting in 4 degrees and the R 11 degrees today prior to IASTM and joint mobilizations. In addition her B great toe PROM was restricted below relative norms this date.    PLAN: Assess her long term response to today's session, consider utilizing Laserstim, and assess pelvic alignment.    Signature: Ephraim Espinoza, PTA

## 2021-09-10 ENCOUNTER — TREATMENT (OUTPATIENT)
Dept: PHYSICAL THERAPY | Facility: CLINIC | Age: 76
End: 2021-09-10

## 2021-09-10 ENCOUNTER — HOSPITAL ENCOUNTER (OUTPATIENT)
Dept: GENERAL RADIOLOGY | Facility: HOSPITAL | Age: 76
Discharge: HOME | End: 2021-09-10
Admitting: PODIATRIST

## 2021-09-10 DIAGNOSIS — M20.42 HAMMER TOES OF BOTH FEET: ICD-10-CM

## 2021-09-10 DIAGNOSIS — M20.41 HAMMER TOES OF BOTH FEET: ICD-10-CM

## 2021-09-10 DIAGNOSIS — M20.12 HALLUX VALGUS, LEFT: ICD-10-CM

## 2021-09-10 DIAGNOSIS — M70.50 PES ANSERINE BURSITIS: ICD-10-CM

## 2021-09-10 DIAGNOSIS — M25.562 CHRONIC PAIN OF BOTH KNEES: Primary | ICD-10-CM

## 2021-09-10 DIAGNOSIS — Z74.09 IMPAIRED MOBILITY: ICD-10-CM

## 2021-09-10 DIAGNOSIS — M20.11 HALLUX VALGUS, RIGHT: ICD-10-CM

## 2021-09-10 DIAGNOSIS — M25.561 CHRONIC PAIN OF BOTH KNEES: Primary | ICD-10-CM

## 2021-09-10 DIAGNOSIS — G89.29 CHRONIC PAIN OF BOTH KNEES: Primary | ICD-10-CM

## 2021-09-10 PROCEDURE — 73630 X-RAY EXAM OF FOOT: CPT

## 2021-09-10 PROCEDURE — 97032 APPL MODALITY 1+ESTIM EA 15: CPT | Performed by: PHYSICAL THERAPIST

## 2021-09-10 PROCEDURE — 97110 THERAPEUTIC EXERCISES: CPT | Performed by: PHYSICAL THERAPIST

## 2021-09-10 NOTE — PROGRESS NOTES
Physical Therapy Treatment Note    Patient: Holli Iyer                                                                                     Visit Date: 9/10/2021  :     1945    Referring practitioner:    Gavino Snachez MD  Date of Initial Visit:          Type: THERAPY  Noted: 2021    Patient seen for 3 sessions    Visit Diagnoses:    ICD-10-CM ICD-9-CM   1. Chronic pain of both knees  M25.561 719.46    M25.562 338.29    G89.29    2. Pes anserine bursitis  M70.50 726.61   3. Impaired mobility  Z74.09 799.89     SUBJECTIVE     Subjective She reports she feels like things have loosened up since the last session     PAIN: 3/10         OBJECTIVE     Objective     Manual Therapy     97804  Comments   B unilateral patellar superior and ML glides Grade 2 reclined   B 1st MTP A/P glides Grade 2 followed by FHL stretches reclined   B unilateral TCJ A/P glides Grades 2 and 3            Timed Minutes 30     Modalities Comments   Combo Cold Laser Estim L medial knee Chronic Inflammation Mode reclined       Minutes 10      Therapy Education/Self Care 36086   Details:    Given symptom relief   Progress: New   Education provided to:  Patient   Level of understanding Verbalized   Timed Minutes      Access Code: [email or web address]  URL: [email or web address]  Date: 2021  Prepared by: Catracho Salomon     Exercises  Clam with Resistance - 1-2 x daily - 7 x weekly - 2 sets - 10 reps  Prone Hip Extension - 1-2 x daily - 7 x weekly - 2 sets - 10 reps  Prone Quad Stretch with Strap - 1-2 x daily - 7 x weekly - 1 sets - 3 reps - 15-20 hold       Total Timed Treatment:     40   mins  Total Time of Visit:             40   mins         ASSESSMENT/PLAN     GOALS    Goals                                          Progress Note due by 21                                                      Recert due by 21   STG by: 3 weeks  Comments Date Status   Patient will demonstrate >25 deg B hip passive IR          Patient will  demonstrate WFL B patellar mobility          Patient will demonstrate ability to maintain tandem standing for >20 seconds with no swaying noted                     LTG by: 6 weeks          Patient will demonstrate independence with comprehensive HEP         Patient will demonstrate 5/5 B hip strength          Patient will demonstrate 5/5 B knee strength          Patient will report worst pain being <3/10 on average for 1 week  3/10 today  9/10/21  Ongoing   Patient will score >55 on LEFS             Assessment/Plan     ASSESSMENT: She reported a slight improvement and reduction in her symptoms after the first session which is promising. Since she was sore from the IASTM I decided to wait until the next session to perform it again.    PLAN: Consider bolstering her HEP, continue Laserstim, and perform IASTM.    Signature: Ephraim Espinoza, PTA

## 2021-09-13 ENCOUNTER — TREATMENT (OUTPATIENT)
Dept: PHYSICAL THERAPY | Facility: CLINIC | Age: 76
End: 2021-09-13

## 2021-09-13 DIAGNOSIS — M70.50 PES ANSERINE BURSITIS: ICD-10-CM

## 2021-09-13 DIAGNOSIS — Z74.09 IMPAIRED MOBILITY: ICD-10-CM

## 2021-09-13 DIAGNOSIS — M25.562 CHRONIC PAIN OF BOTH KNEES: Primary | ICD-10-CM

## 2021-09-13 DIAGNOSIS — G89.29 CHRONIC PAIN OF BOTH KNEES: Primary | ICD-10-CM

## 2021-09-13 DIAGNOSIS — M25.561 CHRONIC PAIN OF BOTH KNEES: Primary | ICD-10-CM

## 2021-09-13 PROCEDURE — 97032 APPL MODALITY 1+ESTIM EA 15: CPT | Performed by: PHYSICAL THERAPIST

## 2021-09-13 PROCEDURE — 97140 MANUAL THERAPY 1/> REGIONS: CPT | Performed by: PHYSICAL THERAPIST

## 2021-09-13 PROCEDURE — 97110 THERAPEUTIC EXERCISES: CPT | Performed by: PHYSICAL THERAPIST

## 2021-09-13 NOTE — PROGRESS NOTES
[facility] - PODIATRY    Today's Date: 09/14/21    Patient Name: Holli Iyer  MRN: [id number]  CSN: [id number]  PCP: Gavino Sanchez MD  Referring Provider: No ref. provider found    SUBJECTIVE     Chief Complaint   Patient presents with   • Follow-up     pcp02/03/2021 3 month fu pain around toenail- non diabetic- pt states had xrays done here at , 2nd toe left foot did good for a while after you trimmed it, as it has grown back out it has gotten painful again- pt deneis pain at present- pt presents with long nail 2nd toe left foot     HPI: Holli Iyer, a 76 y.o.female, comes to clinic as a(n) established patient complaining of ingrown toenail and review of x-rays. Patient has h/o GERD, Hyperthyroid, Insomnia, MVP, Sleep Apnea. Patient is non-diabetic.  Patient states that feet have been doing well overall since last visit but notes that the left 2nd toenail has grown back and feels like it is ingrowing again in the medial border and is tender. Denies any significant redness or drainage. Notes that she is currently undergoing PT for bursitis in her knee. She has had xrays to evaluate her foot deformities. Admits pain at 1/10 level and described as aching and dull. Relates previous treatment(s) including slant back. Denies any constitutional symptoms. No other pedal complaints at this time.    Past Medical History:   Diagnosis Date   • Colon polyp    • GERD (gastroesophageal reflux disease)    • Hyperthyroidism     pt states past issue    • Insomnia    • Irregular heart beat    • MVP (mitral valve prolapse)    • Sleep apnea      Past Surgical History:   Procedure Laterality Date   • BREAST BIOPSY Left    • COLONOSCOPY  02/12/2015   • ENDOSCOPY N/A 10/26/2017    Procedure: ESOPHAGOGASTRODUODENOSCOPY WITH ANESTHESIA;  Surgeon: Selena Thomas MD;  Location: [facility] ENDOSCOPY;  Service:      Family History   Problem Relation Age of Onset   • Heart failure Mother 100   • Ulcerative colitis Mother     • Cancer Father    • Diabetes Brother      Social History     Socioeconomic History   • Marital status:      Spouse name: Not on file   • Number of children: Not on file   • Years of education: Not on file   • Highest education level: Not on file   Tobacco Use   • Smoking status: Never Smoker   • Smokeless tobacco: Never Used   Vaping Use   • Vaping Use: Never used   Substance and Sexual Activity   • Alcohol use: Yes     Comment: galss of wine magui    • Drug use: No   • Sexual activity: Defer     Allergies   Allergen Reactions   • Penicillins Unknown (See Comments)     childhood   • Declomycin [Demeclocycline] Palpitations   • Sulfa Antibiotics Itching     Current Outpatient Medications   Medication Sig Dispense Refill   • aspirin 81 MG EC tablet Take 81 mg by mouth Daily.     • atenolol (TENORMIN) 25 MG tablet TAKE 1 TABLET DAILY 90 tablet 3   • citalopram (CeleXA) 10 MG tablet TAKE 1 TABLET EVERY MORNING 90 tablet 3   • vitamin D (ERGOCALCIFEROL) 1.25 MG (42188 UT) capsule capsule TAKE 1 CAPSULE TWICE A WEEK (Patient taking differently: 50,000 Units Every 7 (Seven) Days.) 24 capsule 3     No current facility-administered medications for this visit.     Review of Systems   Constitutional: Negative for chills and fever.   HENT: Negative for congestion.    Respiratory: Negative for shortness of breath.    Cardiovascular: Negative for chest pain and leg swelling.   Gastrointestinal: Negative for constipation, diarrhea, nausea and vomiting.   Musculoskeletal:        Foot pain   Skin: Negative for wound.   Neurological: Negative for numbness.       OBJECTIVE     Vitals:    09/14/21 1324   BP: 134/68   Pulse: 79   SpO2: 98%       PHYSICAL EXAM  GEN:   Accompanied by none.     Foot/Ankle Exam:       General:   Appearance: appears stated age and healthy    Orientation: AAOx3    Affect: appropriate    Gait: unimpaired    Assistance: independent    Shoe Gear:  Sandals    VASCULAR      Right Foot Vascularity    Dorsalis pedis:  2+  Posterior tibial:  2+  Skin Temperature: warm    Edema Grading:  None  CFT:  3  Pedal Hair Growth:  Present  Varicosities: mild varicosities       Left Foot Vascularity   Dorsalis pedis:  2+  Posterior tibial:  2+  Skin Temperature: warm    Edema Grading:  None  CFT:  3  Pedal Hair Growth:  Present  Varicosities: mild varicosities        NEUROLOGIC     Right Foot Neurologic   Normal sensation    Light touch sensation:  Normal  Vibratory sensation:  Normal  Hot/Cold sensation: normal    Protective Sensation using [place] Monofilament:  10     Left Foot Neurologic   Normal sensation    Light touch sensation:  Normal  Vibratory sensation:  Normal  Hot/cold sensation: normal    Protective Sensation using [place] Monofilament:  10     MUSCULOSKELETAL      Right Foot Musculoskeletal   Ecchymosis:  None  Tenderness: great toe metatarsophalangeal joint and toe 1    Arch:  Normal  Hammertoe:  Second toe  Hallux valgus: Yes (Medial bony eminence with mild abduction of hallux. Somewhat reducible. )       Left Foot Musculoskeletal   Ecchymosis:  None  Tenderness: great toe metatarsophalangeal joint, toenails, toe 1 and toe 2    Arch:  Normal  Hammertoe:  Second toe  Hallux valgus: Yes (Medial bony eminence with mild abduction of hallux. Somewhat reducible.)       MUSCLE STRENGTH     Right Foot Muscle Strength   Foot dorsiflexion:  5  Foot plantar flexion:  5  Foot inversion:  5  Foot eversion:  5     Left Foot Muscle Strength   Foot dorsiflexion:  5  Foot plantar flexion:  5  Foot inversion:  5  Foot eversion:  5     RANGE OF MOTION      Right Foot Range of Motion   Foot and ankle ROM within normal limits       Left Foot Range of Motion   Foot and ankle ROM within normal limits       DERMATOLOGIC     Right Foot Dermatologic   Skin: skin intact    Nails: no ingrown toenail       Left Foot Dermatologic   Skin: skin intact    Nails: dystrophic nails and ingrown toenail (2nd toe medial  border.)        RADIOLOGY/NUCLEAR:  XR Foot 3+ View Bilateral    Result Date: 9/10/2021  Narrative: EXAMINATION: XR FOOT 3+ VW BILATERAL- 9/10/2021 3:41 PM CDT  HISTORY: bunion; M20.12-Hallux valgus (acquired), left foot; M20.11-Hallux valgus (acquired), right foot; M20.41-Other hammer toe(s) (acquired), right foot; M20.42-Other hammer toe(s) (acquired), left foot.  REPORT: 3 weightbearing views of each foot were obtained.  COMPARISON: X-rays of the left foot 5/10/2014.  On the right, osseous alignment is normal and there is no significant hallux valgus deformity. The joint spaces are preserved. There is mild overgrowth of the first metatarsal head with a small bunion. The lateral view demonstrates pes planus deformity and an inferior calcaneal enthesophyte is present. There is a small posterior calcaneal spur on the right as well.  On the left, there are similar changes, with pes planus deformity, inferior calcaneal enthesophyte production and a small posterior calcaneal spur. No fracture is seen on the left. There is overgrowth of the first metatarsal head, with no significant hallux valgus deformity. The joint spaces are preserved on the left.      Impression: No acute osseous abnormality. No significant hallux valgus deformity. Mild overgrowth of the first metatarsal heads, greater on the right with bunion formation. Bilateral pes planus deformity and inferior calcaneal enthesophytes. Small posterior calcaneal spurs are also present. This report was finalized on 09/10/2021 15:44 by Dr. Mustapha Santos MD.      LABORATORY/CULTURE RESULTS:      PATHOLOGY RESULTS:       ASSESSMENT/PLAN     Diagnoses and all orders for this visit:    1. Ingrown toenail (Primary)    2. Foot pain, left    3. Hallux valgus, left    4. Hallux valgus, right    5. Hammer toes of both feet      Comprehensive lower extremity examination and evaluation was performed.  Discussed findings and treatment plan including risks, benefits, and  treatment options with patient in detail. Patient agreed with treatment plan.  After verbal consent obtained, nail(s) x1 debrided of offending borders with nail nipper without incidence  Patient may maintain nails and calluses at home utilizing emery board or pumice stone between visits as needed   Discussed potential PNAs with matrixectomy of left 2nd nail. Will defer for now as she is currently undergoing PT and reports a lot of working being done with feet. Would like to consider this upon completion of PT in November.   Xrays reviewed with patient.  Discussed potential surgical options to correct HAV and angular deformity of toe.   An After Visit Summary was printed and given to the patient at discharge, including (if requested) any available informative/educational handouts regarding diagnosis, treatment, or medications. All questions were answered to patient/family satisfaction. Should symptoms fail to improve or worsen they agree to call or return to clinic or to go to the Emergency Department. Discussed the importance of following up with any needed screening tests/labs/specialist appointments and any requested follow-up recommended by me today. Importance of maintaining follow-up discussed and patient accepts that missed appointments can delay diagnosis and potentially lead to worsening of conditions.  Return in about 2 months (around 11/14/2021)., or sooner if acute issues arise.        This document has been electronically signed by Shorty Raymond DPM on September 14, 2021 13:42 CDT

## 2021-09-13 NOTE — PROGRESS NOTES
Physical Therapy Treatment Note    Patient: Holli Iyer                                                                                     Visit Date: 2021  :     1945    Referring practitioner:    Gavino Sanchez MD  Date of Initial Visit:          Type: THERAPY  Noted: 2021    Patient seen for 4 sessions    Visit Diagnoses:    ICD-10-CM ICD-9-CM   1. Chronic pain of both knees  M25.561 719.46    M25.562 338.29    G89.29    2. Pes anserine bursitis  M70.50 726.61   3. Impaired mobility  Z74.09 799.89     SUBJECTIVE     Subjective She reports she feels like her balance on her R side is better, feels like the knees are getting better    PAIN: 0/10         OBJECTIVE     Objective      Modalities Comments   Combo Cold Laser Estim L medial knee Chronic Inflammation Mode supine       Minutes 10     Manual Therapy     96730  Comments   B unilateral IASTM B gastrocs                     Timed Minutes 20     Therapeutic Exercises    47840 Comments   Assessed B floor to navicular space, standing short foot    Assessed B leg length    L hip IR stretches with intermittent inferior lateral glides    Pillowcase scrunchies in sitting with L foot x 2 min        Timed Minutes 15         Therapy Education/Self Care 33745   Details: Pillow case scrunchies L foot in sitting x 2 min daily   Given Home Exercise Program   Progress: New   Education provided to:  Patient   Level of understanding Verbalized   Timed Minutes      Access Code: [email or web address]  URL: [email or web address]  Date: 2021  Prepared by: Catracho Salomon     Exercises  Clam with Resistance - 1-2 x daily - 7 x weekly - 2 sets - 10 reps  Prone Hip Extension - 1-2 x daily - 7 x weekly - 2 sets - 10 reps  Prone Quad Stretch with Strap - 1-2 x daily - 7 x weekly - 1 sets - 3 reps - 15-20 hold  Pillow case scrunchies L foot in sitting x 2 min daily    Total Timed Treatment:     45   mins  Total Time of Visit:             45   mins         ASSESSMENT/PLAN      GOALS  Goals                                          Progress Note due by 9/30/21                                                      Recert due by 11/28/21   STG by: 3 weeks  Comments Date Status   Patient will demonstrate >25 deg B hip passive IR   R LE 25 degrees L LE 24 degrees post mobilization and stretching   9/13/21  Partially Met   Patient will demonstrate WFL B patellar mobility          Patient will demonstrate ability to maintain tandem standing for >20 seconds with no swaying noted                     LTG by: 6 weeks          Patient will demonstrate independence with comprehensive HEP         Patient will demonstrate 5/5 B hip strength          Patient will demonstrate 5/5 B knee strength          Patient will report worst pain being <3/10 on average for 1 week  3/10 today  9/10/21  Ongoing   Patient will score >55 on LEFS               Assessment/Plan     ASSESSMENT: Her L LE is .2cm less in length as compared to the other side. In addition her L foot pronates more in standing and walking as well.     PLAN: Consider assessing her for tibial torsion, continue IASTM, Laserstim and strengtheing    Signature: Ephraim Espinoza, PTA

## 2021-09-14 ENCOUNTER — OFFICE VISIT (OUTPATIENT)
Dept: PODIATRY | Facility: CLINIC | Age: 76
End: 2021-09-14

## 2021-09-14 VITALS
WEIGHT: 202.2 LBS | OXYGEN SATURATION: 98 % | HEIGHT: 68 IN | SYSTOLIC BLOOD PRESSURE: 134 MMHG | BODY MASS INDEX: 30.65 KG/M2 | DIASTOLIC BLOOD PRESSURE: 68 MMHG | HEART RATE: 79 BPM

## 2021-09-14 DIAGNOSIS — M20.42 HAMMER TOES OF BOTH FEET: ICD-10-CM

## 2021-09-14 DIAGNOSIS — M79.672 FOOT PAIN, LEFT: ICD-10-CM

## 2021-09-14 DIAGNOSIS — M20.41 HAMMER TOES OF BOTH FEET: ICD-10-CM

## 2021-09-14 DIAGNOSIS — M20.11 HALLUX VALGUS, RIGHT: ICD-10-CM

## 2021-09-14 DIAGNOSIS — L60.0 INGROWN TOENAIL: Primary | ICD-10-CM

## 2021-09-14 DIAGNOSIS — M20.12 HALLUX VALGUS, LEFT: ICD-10-CM

## 2021-09-14 PROCEDURE — 99213 OFFICE O/P EST LOW 20 MIN: CPT | Performed by: PODIATRIST

## 2021-09-14 PROCEDURE — 11720 DEBRIDE NAIL 1-5: CPT | Performed by: PODIATRIST

## 2021-09-16 ENCOUNTER — TREATMENT (OUTPATIENT)
Dept: PHYSICAL THERAPY | Facility: CLINIC | Age: 76
End: 2021-09-16

## 2021-09-16 DIAGNOSIS — M70.50 PES ANSERINE BURSITIS: ICD-10-CM

## 2021-09-16 DIAGNOSIS — G89.29 CHRONIC PAIN OF BOTH KNEES: Primary | ICD-10-CM

## 2021-09-16 DIAGNOSIS — M25.562 CHRONIC PAIN OF BOTH KNEES: Primary | ICD-10-CM

## 2021-09-16 DIAGNOSIS — Z74.09 IMPAIRED MOBILITY: ICD-10-CM

## 2021-09-16 DIAGNOSIS — M25.561 CHRONIC PAIN OF BOTH KNEES: Primary | ICD-10-CM

## 2021-09-16 PROCEDURE — 97140 MANUAL THERAPY 1/> REGIONS: CPT | Performed by: PHYSICAL THERAPIST

## 2021-09-16 PROCEDURE — 97032 APPL MODALITY 1+ESTIM EA 15: CPT | Performed by: PHYSICAL THERAPIST

## 2021-09-16 PROCEDURE — 97110 THERAPEUTIC EXERCISES: CPT | Performed by: PHYSICAL THERAPIST

## 2021-09-16 NOTE — PROGRESS NOTES
Physical Therapy Treatment Note    Patient: Holli Iyer                                                                                     Visit Date: 2021  :     1945    Referring practitioner:    Gavino Sanchez MD  Date of Initial Visit:          Type: THERAPY  Noted: 2021    Patient seen for 5 sessions    Visit Diagnoses:    ICD-10-CM ICD-9-CM   1. Chronic pain of both knees  M25.561 719.46    M25.562 338.29    G89.29    2. Pes anserine bursitis  M70.50 726.61   3. Impaired mobility  Z74.09 799.89     SUBJECTIVE     Subjective She reports B knees are hurting today, tender to touch along her L inner thigh    PAIN: 4/10         OBJECTIVE     Objective      Modalities Comments   Combo Cold Laser Estim L medial knee Chronic Inflammation Mode supine         Minutes 10     Manual Therapy     98653  Comments   B 1st MTP A/P glides Grade 2    B TCJ A/P mobilizations Grade 3 with wedge                Timed Minutes 20        Therapeutic Exercises    26032 Comments   B hamstring stretches    L hip IR stretches with intermittent inferior lateral glides                Timed Minutes 15     .  Therapy Education/Self Care 44920   Details:    Given Home Exercise Program   Progress: Reinforced   Education provided to:  Patient   Level of understanding Verbalized   Timed Minutes      Access Code: [email or web address]  URL: [email or web address]  Date: 2021  Prepared by: Catracho Salomon     Exercises  Clam with Resistance - 1-2 x daily - 7 x weekly - 2 sets - 10 reps  Prone Hip Extension - 1-2 x daily - 7 x weekly - 2 sets - 10 reps  Prone Quad Stretch with Strap - 1-2 x daily - 7 x weekly - 1 sets - 3 reps - 15-20 hold  Pillow case scrunchies L foot in sitting x 2 min daily         Total Timed Treatment:     45   mins  Total Time of Visit:             45   mins         ASSESSMENT/PLAN     GOALS    Goals                                          Progress Note due by  9/30/21                                                      Recert due by 11/28/21   STG by: 3 weeks  Comments Date Status   Patient will demonstrate >25 deg B hip passive IR   R LE 25 degrees L LE 24 degrees post mobilization and stretching   9/13/21  Partially Met   Patient will demonstrate WFL B patellar mobility          Patient will demonstrate ability to maintain tandem standing for >20 seconds with no swaying noted                     LTG by: 6 weeks          Patient will demonstrate independence with comprehensive HEP  reinforced today  9/16/21     Patient will demonstrate 5/5 B hip strength          Patient will demonstrate 5/5 B knee strength          Patient will report worst pain being <3/10 on average for 1 week  3/10 today  9/10/21  Ongoing   Patient will score >55 on LEFS             Assessment/Plan     ASSESSMENT: She has gained 5 degrees or more of B DF PROM. She had a flare of her symptoms today but part of this could be attributed to a circulatory issue.    PLAN: Focus on B hip and core strengthening.    Signature: Ephraim Espinoza, PTA

## 2021-09-20 ENCOUNTER — TREATMENT (OUTPATIENT)
Dept: PHYSICAL THERAPY | Facility: CLINIC | Age: 76
End: 2021-09-20

## 2021-09-20 DIAGNOSIS — M25.562 CHRONIC PAIN OF BOTH KNEES: Primary | ICD-10-CM

## 2021-09-20 DIAGNOSIS — M25.561 CHRONIC PAIN OF BOTH KNEES: Primary | ICD-10-CM

## 2021-09-20 DIAGNOSIS — Z74.09 IMPAIRED MOBILITY: ICD-10-CM

## 2021-09-20 DIAGNOSIS — M70.50 PES ANSERINE BURSITIS: ICD-10-CM

## 2021-09-20 DIAGNOSIS — G89.29 CHRONIC PAIN OF BOTH KNEES: Primary | ICD-10-CM

## 2021-09-20 PROCEDURE — 97110 THERAPEUTIC EXERCISES: CPT | Performed by: PHYSICAL THERAPIST

## 2021-09-20 PROCEDURE — 97140 MANUAL THERAPY 1/> REGIONS: CPT | Performed by: PHYSICAL THERAPIST

## 2021-09-20 PROCEDURE — 97032 APPL MODALITY 1+ESTIM EA 15: CPT | Performed by: PHYSICAL THERAPIST

## 2021-09-20 NOTE — PROGRESS NOTES
Physical Therapy Treatment Note    Patient: Holli Iyer                                                                                     Visit Date: 2021  :     1945    Referring practitioner:    Gavino Sanchez MD  Date of Initial Visit:          Type: THERAPY  Noted: 2021    Patient seen for 6 sessions    Visit Diagnoses:    ICD-10-CM ICD-9-CM   1. Chronic pain of both knees  M25.561 719.46    M25.562 338.29    G89.29    2. Pes anserine bursitis  M70.50 726.61   3. Impaired mobility  Z74.09 799.89     SUBJECTIVE     Subjective She reports her knees have been sore the last couple of days     PAIN: 5/10         OBJECTIVE     Objective      Modalities Comments   Combo Cold Laser Estim L medial knee Chronic Inflammation Mode reclined       Minutes 10     Manual Therapy     70430  Comments   B unilateral IASTM B gastrocs                     Timed Minutes 20     Therapeutic Exercises    59147 Comments   B DF stretches on Prostretch    B unilateral SL hip ext with 45 cm SB x 12                Timed Minutes 13          Therapy Education/Self Care 16505   Details:    Given symptom relief   Progress: Reinforced   Education provided to:  Patient   Level of understanding Verbalized   Timed Minutes      Access Code: [email or web address]  URL: [email or web address]  Date: 2021  Prepared by: Catracho Salomon     Exercises  Clam with Resistance - 1-2 x daily - 7 x weekly - 2 sets - 10 reps  Prone Hip Extension - 1-2 x daily - 7 x weekly - 2 sets - 10 reps  Prone Quad Stretch with Strap - 1-2 x daily - 7 x weekly - 1 sets - 3 reps - 15-20 hold  Pillow case scrunchies L foot in sitting x 2 min daily       Total Timed Treatment:     43   mins  Total Time of Visit:             43   mins         ASSESSMENT/PLAN     GOALS    Goals                                          Progress Note due by 21                                                      Recert due by 21   STG by: 3 weeks  Comments Date Status    Patient will demonstrate >25 deg B hip passive IR   R LE 25 degrees L LE 24 degrees post mobilization and stretching   9/13/21  Partially Met   Patient will demonstrate WFL B patellar mobility          Patient will demonstrate ability to maintain tandem standing for >20 seconds with no swaying noted                     LTG by: 6 weeks          Patient will demonstrate independence with comprehensive HEP  reinforced today  9/16/21     Patient will demonstrate 5/5 B hip strength   addressed with SL hip ext with 45 cm SB   9/20/21     Patient will demonstrate 5/5 B knee strength          Patient will report worst pain being <3/10 on average for 1 week  3/10 today  9/10/21  Ongoing   Patient will score >55 on LEFS             Assessment/Plan     ASSESSMENT: She continues to present with a significant amount of soft tissue restrictions in her B gastrocs L more than R and both more superior medial more than lateral. 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     PLAN: Begin utilizing the Laserstim on the R knee, continue to work on reducing the restrictions in her B gastroc, and introduce some static balance activities.     Signature: Ephraim Espinoza, PTA

## 2021-09-23 ENCOUNTER — TREATMENT (OUTPATIENT)
Dept: PHYSICAL THERAPY | Facility: CLINIC | Age: 76
End: 2021-09-23

## 2021-09-23 DIAGNOSIS — G89.29 CHRONIC PAIN OF BOTH KNEES: Primary | ICD-10-CM

## 2021-09-23 DIAGNOSIS — M70.50 PES ANSERINE BURSITIS: ICD-10-CM

## 2021-09-23 DIAGNOSIS — M25.561 CHRONIC PAIN OF BOTH KNEES: Primary | ICD-10-CM

## 2021-09-23 DIAGNOSIS — Z74.09 IMPAIRED MOBILITY: ICD-10-CM

## 2021-09-23 DIAGNOSIS — M25.562 CHRONIC PAIN OF BOTH KNEES: Primary | ICD-10-CM

## 2021-09-23 PROCEDURE — 97140 MANUAL THERAPY 1/> REGIONS: CPT | Performed by: PHYSICAL THERAPIST

## 2021-09-23 PROCEDURE — 97110 THERAPEUTIC EXERCISES: CPT | Performed by: PHYSICAL THERAPIST

## 2021-09-23 NOTE — PROGRESS NOTES
"Physical Therapy Treatment Note    Patient: Holli Iyer                                                                                     Visit Date: 2021  :     1945    Referring practitioner:    Gavino Sanchez MD  Date of Initial Visit:          Type: THERAPY  Noted: 2021    Patient seen for 7 sessions    Visit Diagnoses:    ICD-10-CM ICD-9-CM   1. Chronic pain of both knees  M25.561 719.46    M25.562 338.29    G89.29    2. Pes anserine bursitis  M70.50 726.61   3. Impaired mobility  Z74.09 799.89     SUBJECTIVE     Subjective She was in pain this AM, but is \"comes and goes\". She has no pain right now and reports pain absolves when sitting. She reports (hip ext in side lying) exercise really hurt her hips, but it did feel good at the time.     PAIN: 0/10 > 0/10     OBJECTIVE     Objective      Manual Therapy     02022  Comments   B unilateral IASTM B gastrocs with edge tool and massage cream in DONTE L > R restricted    B patellar mobilizations L more hypomobile > R               Timed Minutes 25     Therapeutic Exercises    45405 Comments   B DF stretches on Prostretch    B SL clamshells against GTB 1 x 25 ea -- progressed from RTB   Applied sure prep and KT tape to B knee anchored on patellar tendon with split tail ending on either side of distal patella  unbillable time: 3 mins   SAQ against GTB 2 x 10 ea       Timed Minutes 17     Therapy Education/Self Care 25486   Details:    Given symptom relief   Progress: Reinforced   Education provided to:  Patient   Level of understanding Verbalized   Timed Minutes      Access Code: [email or web address]  Date: 2021  Prepared by: Catracho Salomon     Exercises  Clam with Resistance - 1-2 x daily - 7 x weekly - 2 sets - 10 reps  Prone Hip Extension - 1-2 x daily - 7 x weekly - 2 sets - 10 reps  Prone Quad Stretch with Strap - 1-2 x daily - 7 x weekly - 1 sets - 3 reps - 15-20 hold  Pillow case scrunchies L foot in sitting x 2 min daily    Total Timed Treatment:  "    43   mins  Total Time of Visit:             44   mins         ASSESSMENT/PLAN     GOALS    Goals                                          Progress Note due by 9/30/21                                                      Recert due by 11/28/21   STG by: 3 weeks  Comments Date Status   Patient will demonstrate >25 deg B hip passive IR   R LE 25 degrees L LE 24 degrees post mobilization and stretching   9/13/21  Partially Met   Patient will demonstrate WFL B patellar mobility  B patella hypomobile, L > R 9/23/21 ongoing   Patient will demonstrate ability to maintain tandem standing for >20 seconds with no swaying noted       ongoing   LTG by: 6 weeks          Patient will demonstrate independence with comprehensive HEP  reinforced today  9/16/21 ongoing   Patient will demonstrate 5/5 B hip strength   addressed with SL hip ext with 45 cm SB   9/20/21 ongoing   Patient will demonstrate 5/5 B knee strength      ongoing   Patient will report worst pain being <3/10 on average for 1 week  3/10 today  9/10/21  Ongoing   Patient will score >55 on LEFS  43 on 8/30/2021 (eval) 8/30/21 ongoing     Assessment/Plan     ASSESSMENT: Patient presented without pain today and tolerated increased resistance (GTB) very well. Her L patella was very hypomobile and was more guarded in L calf vs R.     PLAN: Consider addressing hip mobility and stretches, following with hip stability exercises to offload her knees. Follow up with results of taping today and introduce some static balance activities.     Signature: Zahira Villar, PTA

## 2021-09-27 ENCOUNTER — TREATMENT (OUTPATIENT)
Dept: PHYSICAL THERAPY | Facility: CLINIC | Age: 76
End: 2021-09-27

## 2021-09-27 DIAGNOSIS — M70.50 PES ANSERINE BURSITIS: ICD-10-CM

## 2021-09-27 DIAGNOSIS — M25.561 CHRONIC PAIN OF BOTH KNEES: Primary | ICD-10-CM

## 2021-09-27 DIAGNOSIS — Z74.09 IMPAIRED MOBILITY: ICD-10-CM

## 2021-09-27 DIAGNOSIS — M25.562 CHRONIC PAIN OF BOTH KNEES: Primary | ICD-10-CM

## 2021-09-27 DIAGNOSIS — G89.29 CHRONIC PAIN OF BOTH KNEES: Primary | ICD-10-CM

## 2021-09-27 PROCEDURE — 97112 NEUROMUSCULAR REEDUCATION: CPT | Performed by: PHYSICAL THERAPIST

## 2021-09-27 PROCEDURE — 97032 APPL MODALITY 1+ESTIM EA 15: CPT | Performed by: PHYSICAL THERAPIST

## 2021-09-27 NOTE — PROGRESS NOTES
Physical Therapy Treatment Note    Patient: Holli Iyer                                                                                     Visit Date: 2021  :     1945    Referring practitioner:    Gavino Sanchez MD  Date of Initial Visit:          Type: THERAPY  Noted: 2021    Patient seen for 8 sessions    Visit Diagnoses:    ICD-10-CM ICD-9-CM   1. Chronic pain of both knees  M25.561 719.46    M25.562 338.29    G89.29    2. Pes anserine bursitis  M70.50 726.61   3. Impaired mobility  Z74.09 799.89     SUBJECTIVE     Subjective She reports her knees have been sore but not as sore as they have been    PAIN: 1/10         OBJECTIVE     Objective      Modalities Comments   Combo Cold Laser Estim R knee globally        Minutes 10     Neuromuscular Reeducation     05204 Comments   Limits of Stability and Rhythmic WS'ing testing on [employer]    B Tandem stance                Timed Minutes 30     Therapeutic Exercises    87652 Comments   Resisted B unilateral DF and inversion with green T band x 15                    Timed Minutes 5     Therapy Education/Self Care 87950   Details:    Given fall prevention  mobility training   Progress: New and Reinforced   Education provided to:  Patient   Level of understanding Verbalized   Timed Minutes      Access Code: [email or web address]  Date: 2021  Prepared by: Catracho Salomon     Exercises  Clam with Resistance - 1-2 x daily - 7 x weekly - 2 sets - 10 reps  Prone Hip Extension - 1-2 x daily - 7 x weekly - 2 sets - 10 reps  Prone Quad Stretch with Strap - 1-2 x daily - 7 x weekly - 1 sets - 3 reps - 15-20 hold  Pillow case scrunchies L foot in sitting x 2 min daily       Total Timed Treatment:     45   mins  Total Time of Visit:             45   mins         ASSESSMENT/PLAN     GOALS  Goals                                          Progress Note due by 21                                                      Recert due by 21   STG by: 3 weeks  Comments Date  Status   Patient will demonstrate >25 deg B hip passive IR   R LE 25 degrees L LE 24 degrees post mobilization and stretching   9/13/21  Partially Met   Patient will demonstrate WFL B patellar mobility  B patella hypomobile, L > R 9/23/21 ongoing   Patient will demonstrate ability to maintain tandem standing for >20 seconds with no swaying noted    30 sec B LE   9/27/21 Met   LTG by: 6 weeks          Patient will demonstrate independence with comprehensive HEP  reinforced today  9/16/21 ongoing   Patient will demonstrate 5/5 B hip strength   addressed with SL hip ext with 45 cm SB   9/20/21 ongoing   Patient will demonstrate 5/5 B knee strength      ongoing   Patient will report worst pain being <3/10 on average for 1 week  3/10 today  9/10/21  Ongoing   Patient will score >55 on LEFS  43 on 8/30/2021 (eval) 8/30/21 ongoing         Assessment/Plan     ASSESSMENT: She met her goal for tandem stance today and continues to progress towards all her other remaining goals. She continues to report mild pain and discomfort in B knees but over time the numeric ratings have reduced.    PLAN: Review all goal for a progress note and continue working on increasing B DF and progress her B hip strengthening as well.    Signature: Ephraim Espinoza, PTA

## 2021-09-30 ENCOUNTER — TREATMENT (OUTPATIENT)
Dept: PHYSICAL THERAPY | Facility: CLINIC | Age: 76
End: 2021-09-30

## 2021-09-30 DIAGNOSIS — G89.29 CHRONIC PAIN OF BOTH KNEES: Primary | ICD-10-CM

## 2021-09-30 DIAGNOSIS — Z74.09 IMPAIRED MOBILITY: ICD-10-CM

## 2021-09-30 DIAGNOSIS — M25.562 CHRONIC PAIN OF BOTH KNEES: Primary | ICD-10-CM

## 2021-09-30 DIAGNOSIS — M25.561 CHRONIC PAIN OF BOTH KNEES: Primary | ICD-10-CM

## 2021-09-30 DIAGNOSIS — M70.50 PES ANSERINE BURSITIS: ICD-10-CM

## 2021-09-30 PROCEDURE — 97110 THERAPEUTIC EXERCISES: CPT | Performed by: PHYSICAL THERAPIST

## 2021-09-30 PROCEDURE — 97140 MANUAL THERAPY 1/> REGIONS: CPT | Performed by: PHYSICAL THERAPIST

## 2021-09-30 NOTE — PROGRESS NOTES
Progress Note Addendum      Patient: Holli Iyer           : 1945  Visit Date: 2021  Referring practitioner: Gavino Sanchez MD  Date of Initial Visit: Type: THERAPY  Noted: 2021  Patient seen for 9 sessions  Visit Diagnoses:    ICD-10-CM ICD-9-CM   1. Chronic pain of both knees  M25.561 719.46    M25.562 338.29    G89.29    2. Pes anserine bursitis  M70.50 726.61   3. Impaired mobility  Z74.09 799.89       PT G-Codes  Outcome Measure Options: Lower Extremity Functional Scale (LEFS)  Total: 57  Clinical Progress: improved  Home Program Compliance: Yes  Progress toward previous goals: Partially Met  Prognosis to achieve goals: good    Objective     Assessment & Plan     Assessment  Impairments: abnormal gait, abnormal or restricted ROM, impaired balance, impaired physical strength, lacks appropriate home exercise program and pain with function  Prognosis: good  Functional Limitations: walking, uncomfortable because of pain and standing  Plan  Therapy options: will be seen for skilled physical therapy services  Planned modality interventions: dry needling, low level laser therapy, TENS, electrical stimulation/Russian stimulation, cryotherapy and thermotherapy (hydrocollator packs)  Planned therapy interventions: balance/weight-bearing training, functional ROM exercises, gait training, home exercise program, joint mobilization, manual therapy, neuromuscular re-education, postural training, soft tissue mobilization, strengthening, stretching and therapeutic activities  Frequency: 2x week  Duration in visits: 8  Duration in weeks: 4  Treatment plan discussed with: PRAVEEN        I have reviewed the progress note information provided by Zahira Villar PTA, and I concur with the findings.    Catracho Salomon, PT DPT  Physical Therapist

## 2021-09-30 NOTE — PROGRESS NOTES
"Physical Therapy Treatment Note and 30 Day Progress Note    Patient: Holli Iyer                                                                                     Visit Date: 2021  :     1945    Referring practitioner:    Gavino Sanchez MD  Date of Initial Visit:          Type: THERAPY  Noted: 2021    Patient seen for 9 sessions    Visit Diagnoses:    ICD-10-CM ICD-9-CM   1. Chronic pain of both knees  M25.561 719.46    M25.562 338.29    G89.29    2. Pes anserine bursitis  M70.50 726.61   3. Impaired mobility  Z74.09 799.89     SUBJECTIVE     Subjective She feels about 10% improved since her initial evaluation. She feels pain has improved when \"getting up and down\". She would like to improve her pain with standing. 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 She says it doesn't hurt while sitting until required to stand and move. Reports knee used to \"catch\" before \"popping\" but now only \"pops\". Her L heel \"stays sore\"    PAIN: 2/10 >  0.25/10 (R knee)    PT G-Codes  Outcome Measure Options: Lower Extremity Functional Scale (LEFS)  Total: 57  OBJECTIVE     Objective     Therapeutic Exercises    83414 Comments   B hip MMT Flexion: (R) 5/5 and (L) 5/5  Abd: (L) 4+/5 and 4+/5  Ext: (L) 3+/5 and (R) 4-/5   B knee MMT Ext: 5/5 bilaterally and flex: 5/5 bilaterally    Standing hip abduction 3 x 10 ea -- frequent cues for compensations           Timed Minutes 10     Manual Therapy     59715  Comments   Passive hip IR stretching and mobilization with gait belt Pre stretchin deg (R) and 12 deg (L)   Post mobilization: 25 deg (R) and 17 deg (L)    Patellar mobilizations Hypomobile L > R, R is improving        Timed Minutes 32     Therapy Education/Self Care 13949   Details: Reviewed HEP, POC, standing and sitting intermittently at work for hip mobility, synovial fluid   Given Home Exercise Program  [employer] HEP (access code " [email or web address])  fall prevention  mobility training   Progress: New and Reinforced   Education provided to:  Patient   Level of understanding Verbalized   Timed Minutes      Access Code: [email or web address]  Date: 08/30/2021  Prepared by: Catracho Salomon     Exercises  Clam with Resistance - 1-2 x daily - 7 x weekly - 2 sets - 10 reps  Prone Hip Extension - 1-2 x daily - 7 x weekly - 2 sets - 10 reps  Prone Quad Stretch with Strap - 1-2 x daily - 7 x weekly - 1 sets - 3 reps - 15-20 hold  Pillow case scrunchies L foot in sitting x 2 min daily     Total Timed Treatment:     42   mins  Total Time of Visit:             42   mins         ASSESSMENT/PLAN     GOALS  Goals                                             Progress Note due by 10/30/21                                                                Recert due by 11/28/21   STG by: 3 weeks  Comments Date Status   Patient will demonstrate >25 deg B hip passive IR   R 25 deg L 24 deg on 9/13/21   R 25 deg and L 17 deg (post-mob) on 9/30 9/30/21 ongoing   Patient will demonstrate WFL B patellar mobility  Hypomobile in B patella (L > R), R improving  9/30/21 ongoing   Patient will demonstrate ability to maintain tandem standing for >20 seconds with no swaying noted    30 sec B LE   9/27/21 Met   LTG by: 6 weeks          Patient will demonstrate independence with comprehensive HEP Reports 3x daily compliance of 3 x 10 ea exercise 9/30/21 ongoing   Patient will demonstrate 5/5 B hip strength  Flexion: (R) 5/5 and (L) 5/5  Abd: (L) 4+/5 and 4+/5  Ext: (L) 3+/5 and (R) 4-/5 9/30/21 ongoing   Patient will demonstrate 5/5 B knee strength   B knee strength for flex and ext: 5/5 9/30/21 Met    Patient will report worst pain being <3/10 on average for 1 week On avg, pain is 2.5-3/10 and at worst 6/10 (occurring a couple times per week) 9/30/21  Ongoing   Patient will score >55 on LEFS  43 on 8/30/2021 (eval)   57 on 9/30/2021 9/31/21 Met     Assessment/Plan     ASSESSMENT: Patient has met three of  her eight goals and feels about 10% improved since her initial evaluation. Her B hip IR continues to be limited each session; however, she reports today that d/t her work, she spends prolonged periods of time sitting. We discussed alternating sitting and intermittent standing to help decrease joint stiffness. She is very compliant with her HEP which has helped with her hip strengthening, although hip extensors are especially weak. She would benefit from continued skilled PT to address her joint mobility, B LE and core strength, and other deficits as previously described 2x/week for 4 weeks.      PLAN: Address pt's concern regarding symptoms in heel and dorsum of L foot as well as questions regarding x-ray, as able. Continue addressing B DF and progress hip and core strengthening (tania hip ext) as well. Also continue addressing hip and patellar mobility as needed. However, consider prioritizing strengthening over the next few sessions to increase stability and offload stress placed on knees. Consider bolstering/modifying her HEP as she is currently performing 3 sets ea, 3x daily.     Signature: Zahira Villar, PTA

## 2021-10-04 ENCOUNTER — TREATMENT (OUTPATIENT)
Dept: PHYSICAL THERAPY | Facility: CLINIC | Age: 76
End: 2021-10-04

## 2021-10-04 DIAGNOSIS — M70.50 PES ANSERINE BURSITIS: ICD-10-CM

## 2021-10-04 DIAGNOSIS — Z74.09 IMPAIRED MOBILITY: ICD-10-CM

## 2021-10-04 DIAGNOSIS — G89.29 CHRONIC PAIN OF BOTH KNEES: Primary | ICD-10-CM

## 2021-10-04 DIAGNOSIS — M25.562 CHRONIC PAIN OF BOTH KNEES: Primary | ICD-10-CM

## 2021-10-04 DIAGNOSIS — M25.561 CHRONIC PAIN OF BOTH KNEES: Primary | ICD-10-CM

## 2021-10-04 PROCEDURE — 97032 APPL MODALITY 1+ESTIM EA 15: CPT | Performed by: PHYSICAL THERAPIST

## 2021-10-04 PROCEDURE — 97110 THERAPEUTIC EXERCISES: CPT | Performed by: PHYSICAL THERAPIST

## 2021-10-04 NOTE — PROGRESS NOTES
Physical Therapy Treatment Note    Patient: Holli Iyer                                                                                     Visit Date: 10/4/2021  :     1945    Referring practitioner:    Gavino Sanchez MD  Date of Initial Visit:          Type: THERAPY  Noted: 2021    Patient seen for 10 sessions    Visit Diagnoses:    ICD-10-CM ICD-9-CM   1. Chronic pain of both knees  M25.561 719.46    M25.562 338.29    G89.29    2. Pes anserine bursitis  M70.50 726.61   3. Impaired mobility  Z74.09 799.89     SUBJECTIVE     Subjective She reports her knees are /10, wasn't so good over the weekend really stiff.    PAIN: 1/10         OBJECTIVE     Objective      Modalities Comments   Combo Cold Laser Estim R knee globally Chronic Inflammation Mode Reclined       Minutes 10     Therapeutic Exercises    83817 Comments   B unilateral mini lunge on BOSU blue side up in front of mirror for strict form x 12     B unilateral resisted standing hip abduction with green Bianca 2 x 10    Windshield wipers with 45 cm SB x 15    B SL hip ext with 45 45 cm SB x15        Timed Minutes 35       Therapy Education/Self Care 53620   Details: X ray results   Given postural retraining  symptom relief   Progress: New and Reinforced   Education provided to:  Patient   Level of understanding Verbalized   Timed Minutes      Access Code: [email or web address]  Date: 2021  Prepared by: Catracho Salomon     Exercises  Clam with Resistance - 1-2 x daily - 7 x weekly - 2 sets - 10 reps  Prone Hip Extension - 1-2 x daily - 7 x weekly - 2 sets - 10 reps  Prone Quad Stretch with Strap - 1-2 x daily - 7 x weekly - 1 sets - 3 reps - 15-20 hold  Pillow case scrunchies L foot in sitting x 2 min daily       Total Timed Treatment:     45   mins  Total Time of Visit:             45   mins         ASSESSMENT/PLAN     GOALS    Goals                                             Progress Note due by  10/30/21                                                                Recert due by 11/28/21   STG by: 3 weeks  Comments Date Status   Patient will demonstrate >25 deg B hip passive IR   R 25 deg L 24 deg on 9/13/21   R 25 deg and L 17 deg (post-mob) on 9/30 9/30/21 ongoing   Patient will demonstrate WFL B patellar mobility  Hypomobile in B patella (L > R), R improving  9/30/21 ongoing   Patient will demonstrate ability to maintain tandem standing for >20 seconds with no swaying noted    30 sec B LE   9/27/21 Met   LTG by: 6 weeks          Patient will demonstrate independence with comprehensive HEP Reports 3x daily compliance of 3 x 10 ea exercise 9/30/21 ongoing   Patient will demonstrate 5/5 B hip strength  Flexion: (R) 5/5 and (L) 5/5  Abd: (L) 4+/5 and 4+/5  Ext: (L) 3+/5 and (R) 4-/5 9/30/21 ongoing   Patient will demonstrate 5/5 B knee strength   B knee strength for flex and ext: 5/5 9/30/21 Met    Patient will report worst pain being <3/10 on average for 1 week On avg, pain is 2.5-3/10 and at worst 6/10 (occurring a couple times per week) 9/30/21  Ongoing   Patient will score >55 on LEFS  43 on 8/30/2021 (eval)   57 on 9/30/2021 9/31/21 Met       Assessment/Plan     ASSESSMENT: We reviewed her B foot Xrays and I reinforced how weaker foot intrinsics effect not only her feet but her knees. She inquired about utilizing her SB at home especially for SL hip extension and advised her about avoiding substitutions. With standing hip abduction she required cues to eliminate substitutions as well    PLAN: Review HEP, continue B hip strengthening, and have her perform balance activities as well.    Signature: Ephraim Espinoza, PTA

## 2021-10-07 ENCOUNTER — TREATMENT (OUTPATIENT)
Dept: PHYSICAL THERAPY | Facility: CLINIC | Age: 76
End: 2021-10-07

## 2021-10-07 DIAGNOSIS — M25.562 CHRONIC PAIN OF BOTH KNEES: Primary | ICD-10-CM

## 2021-10-07 DIAGNOSIS — G89.29 CHRONIC PAIN OF BOTH KNEES: Primary | ICD-10-CM

## 2021-10-07 DIAGNOSIS — Z74.09 IMPAIRED MOBILITY: ICD-10-CM

## 2021-10-07 DIAGNOSIS — M70.50 PES ANSERINE BURSITIS: ICD-10-CM

## 2021-10-07 DIAGNOSIS — M25.561 CHRONIC PAIN OF BOTH KNEES: Primary | ICD-10-CM

## 2021-10-07 PROCEDURE — 97110 THERAPEUTIC EXERCISES: CPT | Performed by: PHYSICAL THERAPIST

## 2021-10-07 PROCEDURE — 97032 APPL MODALITY 1+ESTIM EA 15: CPT | Performed by: PHYSICAL THERAPIST

## 2021-10-07 NOTE — PROGRESS NOTES
Physical Therapy Treatment Note    Patient: Holli Iyer                                                                                     Visit Date: 10/7/2021  :     1945    Referring practitioner:    Gavino Sanchez MD  Date of Initial Visit:          Type: THERAPY  Noted: 2021    Patient seen for 11 sessions    Visit Diagnoses:    ICD-10-CM ICD-9-CM   1. Chronic pain of both knees  M25.561 719.46    M25.562 338.29    G89.29    2. Pes anserine bursitis  M70.50 726.61   3. Impaired mobility  Z74.09 799.89     SUBJECTIVE     Subjective She reports B knee pain and stiffness    PAIN: 2/10         OBJECTIVE     Objective      Modalities Comments   Combo Cold Laser Estim R knee globally Chronic Inflammation Mode Reclined         Minutes 10     Therapeutic Exercises    48138 Comments   Assessed B hamstring length hip @ 90    B SL clamshells with blue T band x 15     B unilateral lateral step ups with 4 inch step x 15     B unilateral step ups with contralateral knee up with 4 inch step x 10     Resisted Gait with TRX Rip  x 30 ft x 4    Timed Minutes 30       Therapy Education/Self Care 07052   Details:    Given postural retraining   Progress: Reinforced   Education provided to:  Patient   Level of understanding Verbalized   Timed Minutes      Access Code: [email or web address]  Date: 2021  Prepared by: Catracho Salomon     Exercises  Clam with Resistance - 1-2 x daily - 7 x weekly - 2 sets - 10 reps  Prone Hip Extension - 1-2 x daily - 7 x weekly - 2 sets - 10 reps  Prone Quad Stretch with Strap - 1-2 x daily - 7 x weekly - 1 sets - 3 reps - 15-20 hold  Pillow case scrunchies L foot in sitting x 2 min daily       Total Timed Treatment:     40   mins  Total Time of Visit:             40   mins         ASSESSMENT/PLAN     GOALS  Goals                                             Progress Note due by 10/30/21                                                                Recert due by 21   STG by: 3 weeks   Comments Date Status   Patient will demonstrate >25 deg B hip passive IR   R 25 deg L 24 deg on 9/13/21   R 25 deg and L 17 deg (post-mob) on 9/30 9/30/21 ongoing   Patient will demonstrate WFL B patellar mobility  Hypomobile in B patella (L > R), R improving  9/30/21 ongoing   Patient will demonstrate ability to maintain tandem standing for >20 seconds with no swaying noted    30 sec B LE   9/27/21 Met   LTG by: 6 weeks          Patient will demonstrate independence with comprehensive HEP Reports 3x daily compliance of 3 x 10 ea exercise 9/30/21 ongoing   Patient will demonstrate 5/5 B hip strength  Flexion: (R) 5/5 and (L) 5/5  Abd: (L) 4+/5 and 4+/5  Ext: (L) 3+/5 and (R) 4-/5 9/30/21 ongoing   Patient will demonstrate 5/5 B knee strength   B knee strength for flex and ext: 5/5 9/30/21 Met    Patient will report worst pain being <3/10 on average for 1 week On avg, pain is 2.5-3/10 and at worst 6/10 (occurring a couple times per week) 9/30/21  Ongoing   Patient will score >55 on LEFS  43 on 8/30/2021 (eval)   57 on 9/30/2021 9/31/21 Met         Assessment/Plan     ASSESSMENT: Her B hamstring length was WNL today which surprised me a little as I expected a little restricted ROM. I progressed her POC with more standing therex activities today with brief rests intermittently.     PLAN: Continue to progress her functional strengthening and introduce some dynamic balance activities.    Signature: Ephraim Espinoza, PTA

## 2021-10-11 ENCOUNTER — TREATMENT (OUTPATIENT)
Dept: PHYSICAL THERAPY | Facility: CLINIC | Age: 76
End: 2021-10-11

## 2021-10-11 DIAGNOSIS — G89.29 CHRONIC PAIN OF BOTH KNEES: Primary | ICD-10-CM

## 2021-10-11 DIAGNOSIS — M25.562 CHRONIC PAIN OF BOTH KNEES: Primary | ICD-10-CM

## 2021-10-11 DIAGNOSIS — M70.50 PES ANSERINE BURSITIS: ICD-10-CM

## 2021-10-11 DIAGNOSIS — Z74.09 IMPAIRED MOBILITY: ICD-10-CM

## 2021-10-11 DIAGNOSIS — M25.561 CHRONIC PAIN OF BOTH KNEES: Primary | ICD-10-CM

## 2021-10-11 PROCEDURE — 97110 THERAPEUTIC EXERCISES: CPT | Performed by: PHYSICAL THERAPIST

## 2021-10-11 NOTE — PROGRESS NOTES
"Physical Therapy Treatment Note    Patient: Holli Iyer                                                                                     Visit Date: 10/11/2021  :     1945    Referring practitioner:    Gavino Sanchez MD  Date of Initial Visit:          Type: THERAPY  Noted: 2021    Patient seen for 12 sessions    Visit Diagnoses:    ICD-10-CM ICD-9-CM   1. Chronic pain of both knees  M25.561 719.46    M25.562 338.29    G89.29    2. Pes anserine bursitis  M70.50 726.61   3. Impaired mobility  Z74.09 799.89     SUBJECTIVE     Subjective She reports feeling very good today and has noticed she is slightly more confident in the way she walks. Reports ea of her falls within the past few years have occurred because she has caught her toe and felt like her knee had \"frozen\" prior.     PAIN: 0/10 > 0.5/10     OBJECTIVE     Objective     Therapeutic Exercises    90053 Comments   Shuttle press (5 bands)  2 x 10    B unilateral shuttle press (4 bands) 2 x 10 ea   Eccentric calf raises on shuttle press (4 bands) 2 x 10   B ankle DF passive, ROM following  AROM: (R) 1 deg and (L) 2 deg from 0 (-2)  PROM: (R) 6 deg and (L) 3 deg past 0 (+3)   B passive DF stretch with gait belt Added to HEP   Runner's stretch Added gastroc stretch to HEP, did not add soleus d/t inc knee pain       Timed Minutes 43     Therapy Education/Self Care 37776   Details: DF role in gait, bolstered HEP   Given Home Exercise Program  [employer] HEP (access code [email or web address])  postural retraining  symptom relief   Progress: Reinforced   Education provided to:  Patient   Level of understanding Verbalized   Timed Minutes      Access Code: [email or web address]  Date: 10/11/2021  Prepared by: Zahira Villar    Exercises  Clam with Resistance - 1-2 x daily - 7 x weekly - 2 sets - 10 reps  Prone Hip Extension - 1-2 x daily - 7 x weekly - 2 sets - 10 reps  Prone Quad Stretch with Strap - 1-2 x daily - 7 x weekly - 1 sets - 3 reps - 15-20 hold  Resisted Dorsiflexion " Stretch with TheraBand - 1-2 x daily - 7 x weekly - 3 sets - 30 sec ea hold  Gastroc Stretch on Wall - 1-2 x daily - 7 x weekly - 3 sets - 30 sec ea hold    Total Timed Treatment:     43   mins  Total Time of Visit:             45   mins         ASSESSMENT/PLAN     GOALS  Goals                                             Progress Note due by 10/30/21                                                                Recert due by 11/28/21   STG by: 3 weeks  Comments Date Status   Patient will demonstrate >25 deg B hip passive IR   R 25 deg L 24 deg on 9/13/21   R 25 deg and L 17 deg (post-mob) on 9/30 9/30/21 ongoing   Patient will demonstrate WFL B patellar mobility  Hypomobile in B patella (L > R), R improving  9/30/21 ongoing   Patient will demonstrate ability to maintain tandem standing for >20 seconds with no swaying noted    30 sec B LE   9/27/21 Met   LTG by: 6 weeks          Patient will demonstrate independence with comprehensive HEP Reports 3x daily compliance of 3 x 10 ea exercise 9/30/21 ongoing   Patient will demonstrate 5/5 B hip strength  Flexion: (R) 5/5 and (L) 5/5  Abd: (L) 4+/5 and 4+/5  Ext: (L) 3+/5 and (R) 4-/5 9/30/21 ongoing   Patient will demonstrate 5/5 B knee strength   B knee strength for flex and ext: 5/5 9/30/21 Met    Patient will report worst pain being <3/10 on average for 1 week On average, within the past week, pain has been 1/10 at worst.  10/11/21 Met    Patient will score >55 on LEFS  43 on 8/30/2021 (eval)   57 on 9/30/2021 9/31/21 Met     Assessment/Plan     ASSESSMENT: She presents today with no pain, reporting she notices slightly more confidence with her gait. She also met one of her eight goals and appears to be progressing well towards her remaining three. Her R > L ankle restricted with both passive and active DF; however, neither are WNL and are contributing so some of her gait deviation and stress placed on knee.     PLAN: Continue to progress strengthening, addressing  ankle and knee mobility as needed. Consider introducing dynamic balance activities. Consider IASTM to B calves.     Signature: Zahira Villar, PTA

## 2021-10-14 ENCOUNTER — TREATMENT (OUTPATIENT)
Dept: PHYSICAL THERAPY | Facility: CLINIC | Age: 76
End: 2021-10-14

## 2021-10-14 DIAGNOSIS — G89.29 CHRONIC PAIN OF BOTH KNEES: Primary | ICD-10-CM

## 2021-10-14 DIAGNOSIS — Z74.09 IMPAIRED MOBILITY: ICD-10-CM

## 2021-10-14 DIAGNOSIS — M70.50 PES ANSERINE BURSITIS: ICD-10-CM

## 2021-10-14 DIAGNOSIS — M25.562 CHRONIC PAIN OF BOTH KNEES: Primary | ICD-10-CM

## 2021-10-14 DIAGNOSIS — M25.561 CHRONIC PAIN OF BOTH KNEES: Primary | ICD-10-CM

## 2021-10-14 PROCEDURE — 97110 THERAPEUTIC EXERCISES: CPT | Performed by: PHYSICAL THERAPIST

## 2021-10-14 PROCEDURE — 97140 MANUAL THERAPY 1/> REGIONS: CPT | Performed by: PHYSICAL THERAPIST

## 2021-10-14 PROCEDURE — 97116 GAIT TRAINING THERAPY: CPT | Performed by: PHYSICAL THERAPIST

## 2021-10-14 NOTE — PROGRESS NOTES
"Physical Therapy Treatment Note    Patient: Holli Iyer                                                                                     Visit Date: 10/14/2021  :     1945    Referring practitioner:    Gavino Sanchez MD  Date of Initial Visit:          Type: THERAPY  Noted: 2021    Patient seen for 13 sessions    Visit Diagnoses:    ICD-10-CM ICD-9-CM   1. Chronic pain of both knees  M25.561 719.46    M25.562 338.29    G89.29    2. Pes anserine bursitis  M70.50 726.61   3. Impaired mobility  Z74.09 799.89     SUBJECTIVE     Subjective She denies pain, \"I'm in good shape today.\" She is able to sit in (B ER with knee flex, \"eloisa crossed\") now and hasn't been able to do that in a while. She reports she was very sore following last session.     PAIN: 0/10 > 0/10     OBJECTIVE     Objective     Therapeutic Exercises    37888 Comments   Ankle DF ROM  10/14/21:  AROM: (L) 2 deg past 0, (R) 3 deg  PROM: (L) 6 deg past 0, (R) 8 deg past 0  10/11/21:   AROM: (R) 1 deg and (L) 2 deg from 0 (-2)  PROM: (R) 6 deg and (L) 3 deg past 0 (+3)   Runner's stretch gastroc Bilaterally    hip flex/ER/IR stretch Passive IR stretch (limited), flex/ER: WNL       Timed Minutes 15     Manual Therapy     [id number]  Comments   IASTM with Stickon Tool, STM with massage cream (L) to B calves in prone More restricted medially B, L > R, more sensitive in L vs R   B hip IR mobilization, gait belt Restricted L > R       Timed Minutes 20     Gait Training          [id number]   Task/Terrain Asst AD Comments   Gait assessment with video analysis prior to and following hip mobilization  independ N/A Improved following IR mobilization, reduced eversion at ankles (R > L), overcompensate with hip adductors at times               Timed Minutes 8     Therapy Education/Self Care 10963   Details:    Given Home Exercise Program  [employer] HEP (access code [email or web address])  postural retraining  symptom relief   Progress: Reinforced   Education provided to:  " Patient   Level of understanding Verbalized   Timed Minutes      Access Code: [email or web address]  Date: 10/11/2021  Prepared by: Zahira Villar    Exercises  Clam with Resistance - 1-2 x daily - 7 x weekly - 2 sets - 10 reps  Prone Hip Extension - 1-2 x daily - 7 x weekly - 2 sets - 10 reps  Prone Quad Stretch with Strap - 1-2 x daily - 7 x weekly - 1 sets - 3 reps - 15-20 hold  Resisted Dorsiflexion Stretch with TheraBand - 1-2 x daily - 7 x weekly - 3 sets - 30 sec ea hold  Gastroc Stretch on Wall - 1-2 x daily - 7 x weekly - 3 sets - 30 sec ea hold    Total Timed Treatment:     43   mins  Total Time of Visit:             45   mins         ASSESSMENT/PLAN     GOALS  Goals                                             Progress Note due by 10/30/21                                                                Recert due by 11/28/21   STG by: 3 weeks  Comments Date Status   Patient will demonstrate >25 deg B hip passive IR  9/30/21:   R 25 deg L 24 deg on 9/13/21   R 25 deg and L 17 deg (post-mob)  10/14/21: gait improved following mobilizations (restricted L > R) 10/14/21 ongoing   Patient will demonstrate WFL B patellar mobility  Hypomobile in B patella (L > R), R improving  9/30/21 ongoing   Patient will demonstrate ability to maintain tandem standing for >20 seconds with no swaying noted    30 sec B LE   9/27/21 Met   LTG by: 6 weeks          Patient will demonstrate independence with comprehensive HEP Reports 3x daily compliance of 3 x 10 ea exercise 9/30/21 ongoing   Patient will demonstrate 5/5 B hip strength  Flexion: (R) 5/5 and (L) 5/5  Abd: (L) 4+/5 and 4+/5  Ext: (L) 3+/5 and (R) 4-/5 9/30/21 ongoing   Patient will demonstrate 5/5 B knee strength   B knee strength for flex and ext: 5/5 9/30/21 Met    Patient will report worst pain being <3/10 on average for 1 week No pain reported this session or last for entire duration. Reported pain at worse 1/10 on 10/11/21. Pt report functional mobility also improving.  10/14/21  Met    Patient will score >55 on LEFS  43 on 8/30/2021 (eval)   57 on 9/30/2021 9/31/21 Met     Assessment/Plan     ASSESSMENT: Her hip mobility (IR) is restricting proper gait mechanics resulting in compensations at ankle and hip adductors. Following mobilizations, gait mechanics improved and while still restricted, less deviations noted. She maintained ankle mobility from last session and improved today (see therex table for measurements) which is also helping with her gait mechanics.      PLAN: Consider focus on dynamic balance, potentially with use of neurocom for balance assessment and pre-gait mechanics. Follow with video analysis, time permitting.     Signature: Zahira Villar, PRAVEEN

## 2021-10-18 ENCOUNTER — TREATMENT (OUTPATIENT)
Dept: PHYSICAL THERAPY | Facility: CLINIC | Age: 76
End: 2021-10-18

## 2021-10-18 DIAGNOSIS — M25.562 CHRONIC PAIN OF BOTH KNEES: Primary | ICD-10-CM

## 2021-10-18 DIAGNOSIS — M70.50 PES ANSERINE BURSITIS: ICD-10-CM

## 2021-10-18 DIAGNOSIS — M25.561 CHRONIC PAIN OF BOTH KNEES: Primary | ICD-10-CM

## 2021-10-18 DIAGNOSIS — Z74.09 IMPAIRED MOBILITY: ICD-10-CM

## 2021-10-18 DIAGNOSIS — G89.29 CHRONIC PAIN OF BOTH KNEES: Primary | ICD-10-CM

## 2021-10-18 PROCEDURE — 97112 NEUROMUSCULAR REEDUCATION: CPT | Performed by: PHYSICAL THERAPIST
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
## 2021-10-21 ENCOUNTER — TREATMENT (OUTPATIENT)
Dept: PHYSICAL THERAPY | Facility: CLINIC | Age: 76
End: 2021-10-21

## 2021-10-21 DIAGNOSIS — Z74.09 IMPAIRED MOBILITY: ICD-10-CM

## 2021-10-21 DIAGNOSIS — M25.561 CHRONIC PAIN OF BOTH KNEES: Primary | ICD-10-CM

## 2021-10-21 DIAGNOSIS — M70.50 PES ANSERINE BURSITIS: ICD-10-CM

## 2021-10-21 DIAGNOSIS — M25.562 CHRONIC PAIN OF BOTH KNEES: Primary | ICD-10-CM

## 2021-10-21 DIAGNOSIS — G89.29 CHRONIC PAIN OF BOTH KNEES: Primary | ICD-10-CM

## 2021-10-21 PROCEDURE — 97112 NEUROMUSCULAR REEDUCATION: CPT | Performed by: PHYSICAL THERAPIST

## 2021-10-21 PROCEDURE — 97140 MANUAL THERAPY 1/> REGIONS: CPT | Performed by: PHYSICAL THERAPIST

## 2021-10-21 PROCEDURE — 97110 THERAPEUTIC EXERCISES: CPT | Performed by: PHYSICAL THERAPIST

## 2021-10-21 NOTE — PROGRESS NOTES
Physical Therapy Treatment Note    Patient: Holli Iyer                                                                                     Visit Date: 10/21/2021  :     1945    Referring practitioner:    Gavino Sanchez MD  Date of Initial Visit:          Type: THERAPY  Noted: 2021    Patient seen for 15 sessions    Visit Diagnoses:    ICD-10-CM ICD-9-CM   1. Chronic pain of both knees  M25.561 719.46    M25.562 338.29    G89.29    2. Pes anserine bursitis  M70.50 726.61   3. Impaired mobility  Z74.09 799.89     SUBJECTIVE     Subjective She reports that she has had tenderness in her knees this weekend and especially yesterday. She reports being a tiny bit sore following last session.     PAIN: 0/10 pre and post      OBJECTIVE     Objective     Manual Therapy     40571  Comments   B Hip IR/ER stretches with intermittent inferior/lateral glides with belt In HL   B patella mobs  Limited on L slightly with M/L    Timed Minutes 20       Therapeutic Exercises    17579 Comments   B shuttle press 6 cords with red band at knees to promote proper form throughout the LE's and glute contraction  2 x 20    RIP Trainer resisted side stepping  4 x 30'   RIP  resisted forward walking  2 x 30'    RIP  resisted retrograde ambulation  2 x 30'        Timed Minutes 15     Neuromuscular Reeducation     84176 Comments   Squat into SLS hip abduction back to squat and SL hip abduction on opposing leg at TRX straps 2 x 10    SLS hip airplanes at TRX straps focus on SLS hip stability . 1 x 10 each    Walking in hallway with improved gait mechanics with head turns and nods             Timed Minutes 10           Therapy Education/Self Care 71113   Details:    Given Home Exercise Program  [employer] HEP (access code [email or web address])  postural retraining  symptom relief   Progress: Reinforced   Education provided to:  Patient   Level of understanding Verbalized   Timed Minutes      Access Code: [email or web address]  Date:  10/11/2021  Prepared by: Zahira Villar    Exercises  Clam with Resistance - 1-2 x daily - 7 x weekly - 2 sets - 10 reps  Prone Hip Extension - 1-2 x daily - 7 x weekly - 2 sets - 10 reps  Prone Quad Stretch with Strap - 1-2 x daily - 7 x weekly - 1 sets - 3 reps - 15-20 hold  Resisted Dorsiflexion Stretch with TheraBand - 1-2 x daily - 7 x weekly - 3 sets - 30 sec ea hold  Gastroc Stretch on Wall - 1-2 x daily - 7 x weekly - 3 sets - 30 sec ea hold    Total Timed Treatment:     45   mins  Total Time of Visit:             45   mins         ASSESSMENT/PLAN     GOALS  Goals                                             Progress Note due by 10/30/21                                                                Recert due by 11/28/21   STG by: 3 weeks  Comments Date Status   Patient will demonstrate >25 deg B hip passive IR  R 25 degrees Post mobs  L 26 post mobs  10/21/21 Met   Patient will demonstrate WFL B patellar mobility  Hypomobile in B patella (L > R), R improving  9/30/21 ongoing   Patient will demonstrate ability to maintain tandem standing for >20 seconds with no swaying noted    30 sec B LE   9/27/21 Met   LTG by: 6 weeks          Patient will demonstrate independence with comprehensive HEP Reports 3x daily compliance of 3 x 10 ea exercise 9/30/21 ongoing   Patient will demonstrate 5/5 B hip strength  Flexion: (R) 5/5 and (L) 5/5  Abd: (L) 4+/5 and 4+/5  Ext: (L) 3+/5 and (R) 4-/5 9/30/21 ongoing   Patient will demonstrate 5/5 B knee strength   B knee strength for flex and ext: 5/5 9/30/21 Met    Patient will report worst pain being <3/10 on average for 1 week No pain reported this session or last for entire duration. Reported pain at worse 1/10 on 10/11/21. Pt report functional mobility also improving.  10/14/21 Met    Patient will score >55 on LEFS  43 on 8/30/2021 (eval)   57 on 9/30/2021 9/31/21 Met     Assessment/Plan     ASSESSMENT:  She had some noted tightness in B hamstrings today as well limited L  patellar mobility; however, demonstrated improvement in B hip IR mobility meeting her STG for this. She did well with progression in strengthening but struggled with Balance exercises especially movements involving SLS. She had a slight increase in knee pain with squatting exercise and required increased cues for proper form. Her gait mechanics are improved.     PLAN: Continue to work on Balance and correct for with squatting exercises. Continue to monitor gait mechanics and pain.     Signature: Yaneth Ortiz, PTA

## 2021-10-25 ENCOUNTER — TELEPHONE (OUTPATIENT)
Dept: INTERNAL MEDICINE | Facility: CLINIC | Age: 76
End: 2021-10-25

## 2021-10-25 ENCOUNTER — TREATMENT (OUTPATIENT)
Dept: PHYSICAL THERAPY | Facility: CLINIC | Age: 76
End: 2021-10-25

## 2021-10-25 DIAGNOSIS — M25.561 CHRONIC PAIN OF BOTH KNEES: Primary | ICD-10-CM

## 2021-10-25 DIAGNOSIS — G89.29 CHRONIC PAIN OF BOTH KNEES: Primary | ICD-10-CM

## 2021-10-25 DIAGNOSIS — M70.50 PES ANSERINE BURSITIS: ICD-10-CM

## 2021-10-25 DIAGNOSIS — M25.562 CHRONIC PAIN OF BOTH KNEES: Primary | ICD-10-CM

## 2021-10-25 DIAGNOSIS — Z74.09 IMPAIRED MOBILITY: ICD-10-CM

## 2021-10-25 PROCEDURE — 97110 THERAPEUTIC EXERCISES: CPT | Performed by: PHYSICAL THERAPIST

## 2021-10-25 RX ORDER — ATENOLOL 25 MG/1
25 TABLET ORAL DAILY
Qty: 10 TABLET | Refills: 0 | Status: SHIPPED | OUTPATIENT
Start: 2021-10-25 | End: 2022-03-08 | Stop reason: SDUPTHER

## 2021-10-25 RX ORDER — CITALOPRAM 10 MG/1
10 TABLET ORAL EVERY MORNING
Qty: 10 TABLET | Refills: 0 | Status: SHIPPED | OUTPATIENT
Start: 2021-10-25 | End: 2022-01-10

## 2021-10-25 NOTE — TELEPHONE ENCOUNTER
Caller: Holli Iyer    Relationship: Self      Medication requested (name and dosage): citalopram (CeleXA) 10 MG tablet  atenolol (TENORMIN) 25 MG tablet  Requested Prescriptions:   Requested Prescriptions      No prescriptions requested or ordered in this encounter        Pharmacy where request should be sent:            [id number] - [place], [place] - [address] [place] [facility] [place] [address] - [phone] [facility] [phone]   [phone]         Additional details provided by patient: COMPLETELY OUT. PATIENT HAD THESE ON ORDER AND THEY RAN OUT OVER THE WEEKEND. PATIENT IS REQUESTING A WEEK SUPPLY UNTIL THE OTHERS GET HERE  Best call back number: [phone]    Does the patient have less than a 3 day supply:  [x] Yes  [] No    Jessee Mckeon Rep   10/25/21 09:00 CDT

## 2021-10-25 NOTE — PROGRESS NOTES
Physical Therapy Treatment Note    Patient: Holli Iyer                                                                     Visit Date: 10/25/2021  :     1945    Referring practitioner:    Gavino Sanchez MD  Date of Initial Visit:          Type: THERAPY  Noted: 2021    Patient seen for 16 sessions    Visit Diagnoses:    ICD-10-CM ICD-9-CM   1. Chronic pain of both knees  M25.561 719.46    M25.562 338.29    G89.29    2. Pes anserine bursitis  M70.50 726.61   3. Impaired mobility  Z74.09 799.89     SUBJECTIVE        Subjective She reports having a rougher weekend B knees and her R hip hurt after the last session.     PAIN: 1/2 /10         OBJECTIVE     Objective     Therapeutic Exercises    12115 Comments   B LE Shuttle Press press 7 cords with red band at knees to promote proper form throughout the LE's and glute contraction 2 x 20    B unilateral LE Shuttle Press with 3 bands x 2 x 10    B unilateral LAQ's with #4 x 15    B unilateral resistyed hamstring curls in sitting with red Bianca x 15    B SL hip ext with 45 cm SB x 20     Timed Minutes 38       Therapy Education/Self Care 05425   Details:    Given symptom relief   Progress: New   Education provided to:  Patient   Level of understanding Verbalized   Timed Minutes      Access Code: [email or web address]  Date: 10/11/2021  Prepared by: Zahira Villar     Exercises  Clam with Resistance - 1-2 x daily - 7 x weekly - 2 sets - 10 reps  Prone Hip Extension - 1-2 x daily - 7 x weekly - 2 sets - 10 reps  Prone Quad Stretch with Strap - 1-2 x daily - 7 x weekly - 1 sets - 3 reps - 15-20 hold  Resisted Dorsiflexion Stretch with TheraBand - 1-2 x daily - 7 x weekly - 3 sets - 30 sec ea hold  Gastroc Stretch on Wall - 1-2 x daily - 7 x weekly - 3 sets - 30 sec ea hold       Total Timed Treatment:     38   mins  Total Time of Visit:             38   mins         ASSESSMENT/PLAN     GOALS  Goals                                              Progress Note due by 10/30/21                                                                Recert due by 11/28/21   STG by: 3 weeks  Comments Date Status   Patient will demonstrate >25 deg B hip passive IR  R 25 degrees Post mobs  L 26 post mobs  10/21/21 Met   Patient will demonstrate WFL B patellar mobility  Hypomobile in B patella (L > R), R improving  9/30/21 ongoing   Patient will demonstrate ability to maintain tandem standing for >20 seconds with no swaying noted    30 sec B LE   9/27/21 Met   LTG by: 6 weeks          Patient will demonstrate independence with comprehensive HEP Reports 3x daily compliance of 3 x 10 ea exercise 9/30/21 ongoing   Patient will demonstrate 5/5 B hip strength  Flexion: (R) 5/5 and (L) 5/5  Abd: (L) 4+/5 and 4+/5  Ext: (L) 3+/5 and (R) 4-/5 9/30/21 ongoing   Patient will demonstrate 5/5 B knee strength   B knee strength for flex and ext: 5/5 9/30/21 Met    Patient will report worst pain being <3/10 on average for 1 week No pain reported this session or last for entire duration. Reported pain at worse 1/10 on 10/11/21. Pt report functional mobility also improving.  10/14/21 Met    Patient will score >55 on LEFS  43 on 8/30/2021 (eval)   57 on 9/30/2021 9/31/21 Met         Assessment/Plan     ASSESSMENT: She insinuated being a little more flared due to the last session initially. 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    PLAN: Assess her long term response to today's session and progress accordingly. Continue to focus education on OA management and encourage appropriate activity level.     Signature: Ephraim Espinoza, PTA          [address]  [place] [address]  [phone]

## 2021-10-28 ENCOUNTER — TREATMENT (OUTPATIENT)
Dept: PHYSICAL THERAPY | Facility: CLINIC | Age: 76
End: 2021-10-28

## 2021-10-28 DIAGNOSIS — M25.561 CHRONIC PAIN OF BOTH KNEES: Primary | ICD-10-CM

## 2021-10-28 DIAGNOSIS — G89.29 CHRONIC PAIN OF BOTH KNEES: Primary | ICD-10-CM

## 2021-10-28 DIAGNOSIS — M70.50 PES ANSERINE BURSITIS: ICD-10-CM

## 2021-10-28 DIAGNOSIS — M25.562 CHRONIC PAIN OF BOTH KNEES: Primary | ICD-10-CM

## 2021-10-28 DIAGNOSIS — Z74.09 IMPAIRED MOBILITY: ICD-10-CM

## 2021-10-28 PROCEDURE — 97110 THERAPEUTIC EXERCISES: CPT | Performed by: PHYSICAL THERAPIST

## 2021-10-28 PROCEDURE — 97140 MANUAL THERAPY 1/> REGIONS: CPT | Performed by: PHYSICAL THERAPIST

## 2021-10-28 NOTE — PROGRESS NOTES
Physical Therapy Treatment Note    Patient: Holli Iyer                                                                     Visit Date: 10/28/2021  :     1945    Referring practitioner:    Gavino Sanchez MD  Date of Initial Visit:          Type: THERAPY  Noted: 2021    Patient seen for 17 sessions    Visit Diagnoses:    ICD-10-CM ICD-9-CM   1. Chronic pain of both knees  M25.561 719.46    M25.562 338.29    G89.29    2. Pes anserine bursitis  M70.50 726.61   3. Impaired mobility  Z74.09 799.89     SUBJECTIVE        Subjective She reports feeling about 60% improved. She reports Dr. Woodard reports minimal OA and believes she has bursitis in knees. Her goal with therapy is to not have L leg so weak and help reduce falls as a result. She would like to not fall anymore. If she catches her toes at all (L), she'll fall every time.     PAIN: 1.5-2/10 > 5/10     OBJECTIVE     Objective     Therapeutic Exercises    [id number] Comments   B LE Shuttle Press press (8 cords)  2 x 10    B unilateral LE Shuttle Press with 8 cords 2 x 10 ea   B ankle DF MMT 5/5 grossly    B hip flex MMT L: 4-/5 and R: 4/5   Standing B unilateral marches with 2.5 lb ankle wt 2 x 10 ea -- added to HEP   Reviewed and bolstered HEP Provided written handout   Prone hip ext with 2.5 lb ankle wt  (R) 4 x 10 and (L) 3 x 10        Timed Minutes 28     Therapy Education/Self Care 78455   Details: See below, educ on anatomy/bursitis and compensations to prevent/reduce flare-ups,    Given symptom relief   Progress: New   Education provided to:  Patient   Level of understanding Verbalized   Timed Minutes 8     Access Code: [email or web address]  Date: 10/11/2021  Prepared by: Zahira Villar     Exercises  Clam with Resistance - 1-2 x daily - 7 x weekly - 2 sets - 10 reps  Prone Hip Extension - 1-2 x daily - 7 x weekly - 2 sets - 10 reps  Prone Quad Stretch with Strap - 1-2 x daily - 7 x weekly - 1 sets -  3 reps - 15-20 hold  Resisted Dorsiflexion Stretch with TheraBand - 1-2 x daily - 7 x weekly - 3 sets - 30 sec ea hold  Gastroc Stretch on Wall - 1-2 x daily - 7 x weekly - 3 sets - 30 sec ea hold     Total Timed Treatment:     36   mins  Total Time of Visit:             36   mins         ASSESSMENT/PLAN     GOALS  Goals                                             Progress Note due by 10/30/21                                                                Recert due by 11/28/21   STG by: 3 weeks  Comments Date Status   Patient will demonstrate >25 deg B hip passive IR  R 25 degrees Post mobs  L 26 post mobs  10/21/21 Met   Patient will demonstrate WFL B patellar mobility  Hypomobile in B patella (L > R), R improving  9/30/21 ongoing   Patient will demonstrate ability to maintain tandem standing for >20 seconds with no swaying noted    30 sec B LE   9/27/21 Met   LTG by: 6 weeks          Patient will demonstrate independence with comprehensive HEP Bolstered to reflect hip flex strengthening today.  10/28/21 ongoing   Patient will demonstrate 5/5 B hip strength  Flexion: (R) 5/5 and (L) 5/5  Abd: (L) 4+/5 and 4+/5  Ext: (L) 3+/5 and (R) 4-/5 9/30/21 ongoing   Patient will demonstrate 5/5 B knee strength   B knee strength for flex and ext: 5/5 9/30/21 Met    Patient will report worst pain being <3/10 on average for 1 week No pain reported this session or last for entire duration. Reported pain at worse 1/10 on 10/11/21. Pt report functional mobility also improving.  10/14/21 Met    Patient will score >55 on LEFS  43 on 8/30/2021 (eval)   57 on 9/30/2021 9/31/21 Met     Assessment/Plan     ASSESSMENT: Patient reports Dr. Woodard believes a lot of her knee pain is d/t OA and bursitis. As noted today (see above) while hip abduction and ankle DF has improved, hip extensors and flexors need improvement. Moving forward, will focus more on hip and core strength to help offload some of the stress placed on her knees. She  originally thought falls resulted from lack of DF strength, but as demonstrated today, her primary problem appears to be more of hip flexor strength and endurance.      PLAN: Continue L LE strengthening, prioritizing hip flex and ext strengthening and endurance.     Signature: Zahira Villar, PTA          [address]  [place] [address]  [phone]

## 2021-10-28 NOTE — PROGRESS NOTES
Progress Note Addendum      Patient: Holli Iyer           : 1945  Visit Date: 10/28/2021  Referring practitioner: Gavino Sanchez MD  Date of Initial Visit: Type: THERAPY  Noted: 2021  Patient seen for 17 sessions  Visit Diagnoses:    ICD-10-CM ICD-9-CM   1. Chronic pain of both knees  M25.561 719.46    M25.562 338.29    G89.29    2. Pes anserine bursitis  M70.50 726.61   3. Impaired mobility  Z74.09 799.89          Clinical Progress: improved  Home Program Compliance: Yes  Progress toward previous goals: Partially Met  Prognosis to achieve goals: good    Objective     Assessment & Plan     Assessment  Impairments: abnormal gait, abnormal or restricted ROM, impaired balance, impaired physical strength, lacks appropriate home exercise program and pain with function  Prognosis: good  Functional Limitations: walking, uncomfortable because of pain and standing  Plan  Therapy options: will be seen for skilled physical therapy services  Planned modality interventions: dry needling, low level laser therapy, TENS, electrical stimulation/Russian stimulation, cryotherapy and thermotherapy (hydrocollator packs)  Planned therapy interventions: balance/weight-bearing training, functional ROM exercises, gait training, home exercise program, joint mobilization, manual therapy, neuromuscular re-education, postural training, soft tissue mobilization, strengthening, stretching and therapeutic activities  Duration in weeks: 12  Treatment plan discussed with: PTA      Manual Therapy     [id number]  Comments   B paterllar superior and inferior mobilizations        Timed Minutes 8        Goals                                             Progress Note due by 10/30/21                                                                Recert due by 21   STG by: 3 weeks  Comments Date Status   Patient will demonstrate >25 deg B hip passive IR  R 25 degrees Post mobs  L 26 post mobs  10/21/21 Met   Patient will demonstrate WFL B  patellar mobility  Hypomobile in B patella (L > R) 10/28/21 ongoing   Patient will demonstrate ability to maintain tandem standing for >20 seconds with no swaying noted    30 sec B LE   9/27/21 Met   LTG by: 6 weeks          Patient will demonstrate independence with comprehensive HEP Bolstered to reflect hip flex strengthening today.  10/28/21 ongoing   Patient will demonstrate 5/5 B hip strength  Flexion: (R) 5/5 and (L) 5/5  Abd: (L) 4+/5 and 4+/5  Ext: (L) 3+/5 and (R) 4-/5  Glute max : L 3+/5, R 4-/5 10/28/21 ongoing   Patient will demonstrate 5/5 B knee strength   B knee strength for flex and ext: 5/5 9/30/21 Met    Patient will report worst pain being <3/10 on average for 1 week No pain reported this session or last for entire duration. Reported pain at worse 1/10 on 10/11/21. Pt report functional mobility also improving.  10/14/21 Met    Patient will score >55 on LEFS  43 on 8/30/2021 (eval)   57 on 9/30/2021 9/31/21 Met       I have reviewed the progress note information provided by Zahira Villar PTA, and I concur with the findings.    SIGNATURE: Catracho Salomon PT DPT, License #: [id number]  Electronically Signed on 10/28/2021

## 2021-11-01 ENCOUNTER — TREATMENT (OUTPATIENT)
Dept: PHYSICAL THERAPY | Facility: CLINIC | Age: 76
End: 2021-11-01

## 2021-11-01 DIAGNOSIS — Z74.09 IMPAIRED MOBILITY: ICD-10-CM

## 2021-11-01 DIAGNOSIS — M70.50 PES ANSERINE BURSITIS: ICD-10-CM

## 2021-11-01 DIAGNOSIS — G89.29 CHRONIC PAIN OF BOTH KNEES: Primary | ICD-10-CM

## 2021-11-01 DIAGNOSIS — M25.562 CHRONIC PAIN OF BOTH KNEES: Primary | ICD-10-CM

## 2021-11-01 DIAGNOSIS — M25.561 CHRONIC PAIN OF BOTH KNEES: Primary | ICD-10-CM

## 2021-11-01 PROCEDURE — 97110 THERAPEUTIC EXERCISES: CPT | Performed by: PHYSICAL THERAPIST

## 2021-11-01 NOTE — PROGRESS NOTES
Physical Therapy Treatment Note    Patient: Holli Iyer                                                                     Visit Date: 2021  :     1945    Referring practitioner:    Gavino Sanchez MD  Date of Initial Visit:          Type: THERAPY  Noted: 2021    Patient seen for 18 sessions    Visit Diagnoses:    ICD-10-CM ICD-9-CM   1. Chronic pain of both knees  M25.561 719.46    M25.562 338.29    G89.29    2. Impaired mobility  Z74.09 799.89   3. Pes anserine bursitis  M70.50 726.61     SUBJECTIVE      Subjective She sold her bike because she was afraid of falling. She reports today is the worst day she's had in a while, pain is bad especially when getting up/down and walking. She took an Aleve     PAIN: 2/10 > 0/10     OBJECTIVE     Objective     Therapeutic Exercises    49822 Comments   Unicam, seat 3, resistance level 1 10 min    B lateral shuffling with green Bianca 2 x 10 ea   Fwd walking marches with green Bianca 2 x 10 ea   Monster walks with green Bianca 2 x 10 ea   B contralateral toy soldiers 2 x 10 ea   B alternating marches on BL TheraDisc at TRX straps 2 x 10 ea   Mini squats on BL TheraDisc at TRX straps 2 x 10    Standing hip ext at TRX straps 2 x 10 ea   B airplanes at TRX straps 1 x 20 ea   Modified praying mantis with 45 cm SB 2 x 10    Ball presses with 45 cm physioball 2 x 10       Timed Minutes 40     Therapy Education/Self Care 10379   Details:    Given Home Exercise Program  [employer] [facility] (access code [email or web address])   Progress: Reinforced   Education provided to:  Patient   Level of understanding Verbalized   Timed Minutes      Access Code: [email or web address]  Date: 10/11/2021  Prepared by: Zahira Villar     Exercises  Clam with Resistance - 1-2 x daily - 7 x weekly - 2 sets - 10 reps  Prone Hip Extension - 1-2 x daily - 7 x weekly - 2 sets - 10 reps  Prone Quad Stretch with Strap - 1-2 x daily - 7 x weekly - 1 sets - 3 reps  - 15-20 hold  Resisted Dorsiflexion Stretch with TheraBand - 1-2 x daily - 7 x weekly - 3 sets - 30 sec ea hold  Gastroc Stretch on Wall - 1-2 x daily - 7 x weekly - 3 sets - 30 sec ea hold     Total Timed Treatment:     40   mins  Total Time of Visit:             43   mins         ASSESSMENT/PLAN     GOALS  Goals                                             Progress Note due by 11/27/21                                                                Recert due by 11/28/21   STG by: 3 weeks  Comments Date Status   Patient will demonstrate >25 deg B hip passive IR  R 25 degrees Post mobs  L 26 post mobs  10/21/21 Met   Patient will demonstrate WFL B patellar mobility  Hypomobile in B patella (L > R) 10/28/21 ongoing   Patient will demonstrate ability to maintain tandem standing for >20 seconds with no swaying noted    30 sec B LE   9/27/21 Met   LTG by: 6 weeks          Patient will demonstrate independence with comprehensive HEP B LE strengthening, focus on strengthening and endurance today 11/1/21 ongoing   Patient will demonstrate 5/5 B hip strength  Flexion: (R) 5/5 and (L) 5/5  Abd: (L) 4+/5 and 4+/5  Ext: (L) 3+/5 and (R) 4-/5  Glute max : L 3+/5, R 4-/5 10/28/21 ongoing   Patient will demonstrate 5/5 B knee strength   B knee strength for flex and ext: 5/5 9/30/21 Met    Patient will report worst pain being <3/10 on average for 1 week No pain reported this session or last for entire duration. Reported pain at worse 1/10 on 10/11/21. Pt report functional mobility also improving.  10/14/21 Met    Patient will score >55 on LEFS  43 on 8/30/2021 (eval)   57 on 9/30/2021 9/31/21 Met     Assessment/Plan     ASSESSMENT: Progressed B LE strengthening today utilizing heavy resistance. Also focused on endurance training, to which patient did demonstrate increased fatigue by EOS but reported absolved pain.     PLAN: Continue L LE strengthening, prioritizing hip flex and ext strengthening and endurance. Consider neurocom for  balance assessment if appropriate.     Signature: Zahira Villar PTA, License #:[id number]    Electronically Signed on 11/1/2021          [address] [place] [address]. [address]  [phone]

## 2021-11-04 ENCOUNTER — TREATMENT (OUTPATIENT)
Dept: PHYSICAL THERAPY | Facility: CLINIC | Age: 76
End: 2021-11-04

## 2021-11-04 DIAGNOSIS — G89.29 CHRONIC PAIN OF BOTH KNEES: Primary | ICD-10-CM

## 2021-11-04 DIAGNOSIS — Z74.09 IMPAIRED MOBILITY: ICD-10-CM

## 2021-11-04 DIAGNOSIS — M25.562 CHRONIC PAIN OF BOTH KNEES: Primary | ICD-10-CM

## 2021-11-04 DIAGNOSIS — M25.561 CHRONIC PAIN OF BOTH KNEES: Primary | ICD-10-CM

## 2021-11-04 DIAGNOSIS — M70.50 PES ANSERINE BURSITIS: ICD-10-CM

## 2021-11-04 PROCEDURE — 97116 GAIT TRAINING THERAPY: CPT | Performed by: PHYSICAL THERAPIST

## 2021-11-04 PROCEDURE — 97110 THERAPEUTIC EXERCISES: CPT | Performed by: PHYSICAL THERAPIST

## 2021-11-04 NOTE — PROGRESS NOTES
Physical Therapy Treatment Note    Patient: Holli Iyer                                                                     Visit Date: 2021  :     1945    Referring practitioner:    Gavino Sanchez MD  Date of Initial Visit:          Type: THERAPY  Noted: 2021    Patient seen for 19 sessions    Visit Diagnoses:    ICD-10-CM ICD-9-CM   1. Chronic pain of both knees  M25.561 719.46    M25.562 338.29    G89.29    2. Pes anserine bursitis  M70.50 726.61   3. Impaired mobility  Z74.09 799.89     SUBJECTIVE      Subjective Her allergies are flaring up, but she's not in a lot of pain. The past few days, her pain has been flared to about 4/10, but has been working more. She has added a table for work station so she can stand more frequently. She thinks this is contributing to her pain, didn't have a lot of pain following last session. She tripped over a small pile of leaves in her yard. She thinks one leg is longer than the other. She is having N/T through arms at night, wants to seek PT for neck at beginning of year.     PAIN: 2/10 > 0/10     OBJECTIVE     Objective     Therapeutic Exercises    23086 Comments   Unicam, seat 3, resistance level 2 12 min    Assessed patellar mobility  WNL bilaterally    Seated marches with 2.5 lb ankle wt 2 x 10 ea   Shuttle press (8 cords)  5 min       Timed Minutes 25     Gait Training          25620   Task/Terrain Asst AD Comments   Assessed gait in hallway, use of video analysis (with and without shoes) indep N/A R ankle everted, decreased step height, intermittent lateral deviations, R hip drop   B unilateral cone taps with 1.5 lb ankle wt SBA N/A 1 x 26 ea         Timed Minutes 15     Therapy Education/Self Care 62621   Details:    Given Home Exercise Program  [employer] HEP (access code [email or web address])   Progress: Reinforced   Education provided to:  Patient   Level of understanding Verbalized   Timed Minutes       Access Code: [email or web address]  Date: 11/04/2021  Prepared by: Zahira Villar    Exercises  Clam with Resistance - 1-2 x daily - 7 x weekly - 2 sets - 10 reps  Prone Hip Extension - 1-2 x daily - 7 x weekly - 2 sets - 10 reps  Prone Quad Stretch with Strap - 1-2 x daily - 7 x weekly - 1 sets - 3 reps - 15-20 hold  Resisted Dorsiflexion Stretch with TheraBand - 1-2 x daily - 7 x weekly - 3 sets - 30 sec ea hold  Gastroc Stretch on Wall - 1-2 x daily - 7 x weekly - 3 sets - 30 sec ea hold  Standing March with Counter Support - 1-2 x daily - 7 x weekly - 2 sets - 10 reps     Total Timed Treatment:     40   mins  Total Time of Visit:             45   mins         ASSESSMENT/PLAN     GOALS  Goals                                             Progress Note due by 11/27/21                                                                Recert due by 11/28/21   STG by: 3 weeks  Comments Date Status   Patient will demonstrate >25 deg B hip passive IR  R 25 degrees Post mobs  L 26 post mobs  10/21/21 Met   Patient will demonstrate WFL B patellar mobility  Mobility was WNL today bilaterally  11/4/21 Met    Patient will demonstrate ability to maintain tandem standing for >20 seconds with no swaying noted    30 sec B LE   9/27/21 Met   LTG by: 6 weeks          Patient will demonstrate independence with comprehensive HEP B LE strengthening, focus on strengthening and endurance today 11/1/21 ongoing   Patient will demonstrate 5/5 B hip strength  Flexion: (R) 5/5 and (L) 5/5  Abd: (L) 4+/5 and 4+/5  Ext: (L) 3+/5 and (R) 4-/5  Glute max : L 3+/5, R 4-/5 10/28/21 ongoing   Patient will demonstrate 5/5 B knee strength   B knee strength for flex and ext: 5/5 9/30/21 Met    Patient will report worst pain being <3/10 on average for 1 week No pain reported this session or last for entire duration. Reported pain at worse 1/10 on 10/11/21. Pt report functional mobility also improving.  10/14/21 Met    Patient will score >55 on LEFS  43 on  8/30/2021 (eval)   57 on 9/30/2021 9/31/21 Met     Assessment/Plan     ASSESSMENT: She has begun working more often which has flared her symptoms, but reports she altered her work station to be more ergonomic and allow longer periods of standing. She tolerated last session well, indicating flare was likely d/t to increased work vs exercises, therefore continued with B LE and core strengthening today. She met one of her goals, total of six goals met thus far, for patellar mobility. Her biggest hindrance at this point seems to be her hip strength as it is affecting her gait and causing her to trip.     PLAN: Continue B LE (tania L) strengthening, prioritizing hip flex / ext strengthening and endurance, progressing sets/reps to 3 x 10 if tolerated. Consider assessing pelvic alignment.    Signature: Zahira Villar PTA, License #:[id number]    Electronically Signed on 11/4/2021          [address] [place] [address]. [address]  [phone]

## 2021-11-08 ENCOUNTER — TREATMENT (OUTPATIENT)
Dept: PHYSICAL THERAPY | Facility: CLINIC | Age: 76
End: 2021-11-08

## 2021-11-08 DIAGNOSIS — G89.29 CHRONIC PAIN OF BOTH KNEES: Primary | ICD-10-CM

## 2021-11-08 DIAGNOSIS — Z74.09 IMPAIRED MOBILITY: ICD-10-CM

## 2021-11-08 DIAGNOSIS — M25.562 CHRONIC PAIN OF BOTH KNEES: Primary | ICD-10-CM

## 2021-11-08 DIAGNOSIS — M70.50 PES ANSERINE BURSITIS: ICD-10-CM

## 2021-11-08 DIAGNOSIS — M25.561 CHRONIC PAIN OF BOTH KNEES: Primary | ICD-10-CM

## 2021-11-08 PROCEDURE — 97112 NEUROMUSCULAR REEDUCATION: CPT | Performed by: PHYSICAL THERAPIST

## 2021-11-08 PROCEDURE — 97110 THERAPEUTIC EXERCISES: CPT | Performed by: PHYSICAL THERAPIST

## 2021-11-08 NOTE — PROGRESS NOTES
Physical Therapy Treatment Note    Patient: Holli Iyer                                                                     Visit Date: 2021  :     1945    Referring practitioner:    Gavino Sanchez MD  Date of Initial Visit:          Type: THERAPY  Noted: 2021    Patient seen for 20 sessions    Visit Diagnoses:    ICD-10-CM ICD-9-CM   1. Chronic pain of both knees  M25.561 719.46    M25.562 338.29    G89.29    2. Pes anserine bursitis  M70.50 726.61   3. Impaired mobility  Z74.09 799.89     SUBJECTIVE      Subjective Her pain isn't bad right now, but has been bad this past weekend. She reports being able to stand is helping her, but is often unable to do so d/t actual task vs pain. Sometimes she'll find herself standing and fine but then she'll feel herself tipping.     PAIN: 1/10 > 0/10     OBJECTIVE     Objective     Therapeutic Exercises    94500 Comments   Unicam, seat 3, resistance level 2    B standing hip abduction at TRX straps 4 x 10 ea   B standing hip ext at TRX straps 2 x 10 ea   Walking with 2.5 lb ankle wt B for endurance over level surface  310 ft   B lateral steps against 2.5 lb ankle weight 2 x 10 ea       Timed Minutes 23     Neuromuscular Reeducation     01767 Comments   M/L wobble board sit <> stands, B UE support 1 x 10    M/L and A/P WS on wobble board 2 x 10 ea   B cone taps (1) on wobble board (M/L) 2 x 10 ea       Timed Minutes 20     Therapy Education/Self Care 57712   Details:    Given Home Exercise Program  [employer] [facility] (access code [email or web address])   Progress: Reinforced   Education provided to:  Patient   Level of understanding Verbalized   Timed Minutes      Access Code: [email or web address]  Date: 2021  Prepared by: Zahira Villar    Exercises  Clam with Resistance - 1-2 x daily - 7 x weekly - 2 sets - 10 reps  Prone Hip Extension - 1-2 x daily - 7 x weekly - 2 sets - 10 reps  Prone Quad Stretch with Strap - 1-2 x  daily - 7 x weekly - 1 sets - 3 reps - 15-20 hold  Resisted Dorsiflexion Stretch with TheraBand - 1-2 x daily - 7 x weekly - 3 sets - 30 sec ea hold  Gastroc Stretch on Wall - 1-2 x daily - 7 x weekly - 3 sets - 30 sec ea hold  Standing March with Counter Support - 1-2 x daily - 7 x weekly - 2 sets - 10 reps     Total Timed Treatment:     43   mins  Total Time of Visit:             45   mins         ASSESSMENT/PLAN     GOALS  Goals                                             Progress Note due by 11/27/21                                                                Recert due by 11/28/21   STG by: 3 weeks  Comments Date Status   Patient will demonstrate >25 deg B hip passive IR  R 25 degrees Post mobs  L 26 post mobs  10/21/21 Met   Patient will demonstrate WFL B patellar mobility  Mobility was WNL today bilaterally  11/4/21 Met    Patient will demonstrate ability to maintain tandem standing for >20 seconds with no swaying noted    30 sec B LE   9/27/21 Met   LTG by: 6 weeks          Patient will demonstrate independence with comprehensive HEP B LE strengthening, focus on strengthening and endurance today 11/1/21 ongoing   Patient will demonstrate 5/5 B hip strength  10/28/21:  Flexion: (R) 5/5 and (L) 5/5  Abd: (L) 4+/5 and 4+/5  Ext: (L) 3+/5 and (R) 4-/5  Glute max : L 3+/5, R 4-/5  11/8/21: dec endurance , tania on L 10/28/21 ongoing   Patient will demonstrate 5/5 B knee strength   B knee strength for flex and ext: 5/5 9/30/21 Met    Patient will report worst pain being <3/10 on average for 1 week No pain reported this session or last for entire duration. Reported pain at worse 1/10 on 10/11/21. Pt report functional mobility also improving.  10/14/21 Met    Patient will score >55 on LEFS  43 on 8/30/2021 (eval)   57 on 9/30/2021 9/31/21 Met     Assessment/Plan     ASSESSMENT: Her strength is improving, but still demonstrates decreased strength and endurance (especially on L). Patient reports continued issues with  L foot clearance, but demonstrated increased fatigue on L LE following walking for endurance today. This indicates issue is indeed more of an issue with hip flexor strength/endurance.    PLAN: Consider addressing pelvic alignment and prioritize hip flexor strengthening. Also progress balance as able.    Signature: Zahira Villar PTA, License #:[id number]    Electronically Signed on 11/8/2021          [address] [place] [address]  [phone]

## 2021-11-11 ENCOUNTER — TREATMENT (OUTPATIENT)
Dept: PHYSICAL THERAPY | Facility: CLINIC | Age: 76
End: 2021-11-11

## 2021-11-11 DIAGNOSIS — G89.29 CHRONIC PAIN OF BOTH KNEES: Primary | ICD-10-CM

## 2021-11-11 DIAGNOSIS — M70.50 PES ANSERINE BURSITIS: ICD-10-CM

## 2021-11-11 DIAGNOSIS — Z74.09 IMPAIRED MOBILITY: ICD-10-CM

## 2021-11-11 DIAGNOSIS — M25.562 CHRONIC PAIN OF BOTH KNEES: Primary | ICD-10-CM

## 2021-11-11 DIAGNOSIS — M25.561 CHRONIC PAIN OF BOTH KNEES: Primary | ICD-10-CM

## 2021-11-11 PROCEDURE — 97112 NEUROMUSCULAR REEDUCATION: CPT

## 2021-11-11 PROCEDURE — 97110 THERAPEUTIC EXERCISES: CPT

## 2021-11-11 NOTE — PROGRESS NOTES
"                                                                Physical Therapy Treatment Note    Patient: Holli Iyer                                                                     Visit Date: 2021  :     1945    Referring practitioner:    Gavino Sanchez MD  Date of Initial Visit:          Type: THERAPY  Noted: 2021    Patient seen for 21 sessions    Visit Diagnoses:    ICD-10-CM ICD-9-CM   1. Chronic pain of both knees  M25.561 719.46    M25.562 338.29    G89.29    2. Pes anserine bursitis  M70.50 726.61   3. Impaired mobility  Z74.09 799.89     SUBJECTIVE      Subjective She reports she has begun to have a \"burning\" sensation in her R ribcage, similar to side stitch. She notices it most when she's been sitting for a prolonged period of time, wonders if it's d/t her posture. She may go to her dr and see if he can do anything about it.     PAIN: 0/10 > 0/10     OBJECTIVE     Objective     Therapeutic Exercises    22617 Comments   Unicam, seat 3, resistance level 3 10 min   Assessed B LE for true leg length discrepency symmetrical   Assessed rib cage and scapular movement WNL   Assessed gait pattern Occasional R lateral deviation with decreased WS onto L, but overall improved gait mechanics            Timed Minutes 30     Neuromuscular Reeducation     47344 Comments   M/L WS on wobble board 2 x 10   B anterolateral and anterior (B LE) cone taps (3 total) on wobble board (M/L) 1 x 10 ea -- L SLS on wobble board very difficult       Timed Minutes 12     Therapy Education/Self Care 93405   Details:    Given Home Exercise Program  [employer] HEP (access code [email or web address])   Progress: Reinforced   Education provided to:  Patient   Level of understanding Verbalized   Timed Minutes      Access Code: [email or web address]  Date: 2021  Prepared by: Zahira Villar    Exercises  Clam with Resistance - 1-2 x daily - 7 x weekly - 2 sets - 10 reps  Prone Hip Extension - 1-2 x daily - 7 x weekly - 2 sets - 10 " reps  Prone Quad Stretch with Strap - 1-2 x daily - 7 x weekly - 1 sets - 3 reps - 15-20 hold  Resisted Dorsiflexion Stretch with TheraBand - 1-2 x daily - 7 x weekly - 3 sets - 30 sec ea hold  Gastroc Stretch on Wall - 1-2 x daily - 7 x weekly - 3 sets - 30 sec ea hold  Standing March with Counter Support - 1-2 x daily - 7 x weekly - 2 sets - 10 reps     Total Timed Treatment:     42   mins  Total Time of Visit:             45   mins         ASSESSMENT/PLAN     GOALS  Goals                                             Progress Note due by 11/27/21                                                                Recert due by 11/28/21   STG by: 3 weeks  Comments Date Status   Patient will demonstrate >25 deg B hip passive IR  R 25 degrees Post mobs  L 26 post mobs  10/21/21 Met   Patient will demonstrate WFL B patellar mobility  Mobility was WNL today bilaterally  11/4/21 Met    Patient will demonstrate ability to maintain tandem standing for >20 seconds with no swaying noted    30 sec B LE   9/27/21 Met   LTG by: 6 weeks          Patient will demonstrate independence with comprehensive HEP Reinforced good posture during work simulated activities and ADLs.  11/1/21 ongoing   Patient will demonstrate 5/5 B hip strength  10/28/21:  Flexion: (R) 5/5 and (L) 5/5  Abd: (L) 4+/5 and 4+/5  Ext: (L) 3+/5 and (R) 4-/5  Glute max : L 3+/5, R 4-/5  11/8/21: dec endurance , tania on L 10/28/21 ongoing   Patient will demonstrate 5/5 B knee strength   B knee strength for flex and ext: 5/5 9/30/21 Met    Patient will report worst pain being <3/10 on average for 1 week No pain reported this session or last for entire duration. Reported pain at worse 1/10 on 10/11/21. Pt report functional mobility also improving.  10/14/21 Met    Patient will score >55 on LEFS  43 on 8/30/2021 (eval)   57 on 9/30/2021 9/31/21 Met     Assessment/Plan     ASSESSMENT: She has met all but two of her goals and is progressing very well towards meeting them.  Her gait mechanics have greatly improved with occasional R lateral deviation d/t decreased WS onto L LE. She reported pain in R ribcage but it does not appear that her rib cage movement or scapular position are contributing to this, especially as she demonstrates good posture with work simulated tasks. I advised her to return to PCP if pain persists. As she has grossly improved, we have reduced her POC to 1x/week this date and will focus more on her M <> L WS and balance, quad control, and refining her gait mechanics in following sessions.     PLAN: Consider focusing on balance and gait mechanics. Consider eccentric quad strengthening, if appropriate.     Signature: Zahira Villar PTA, License #:[id number]    Electronically Signed on 11/8/2021          [address] [place] [address]. [address]  [phone]

## 2021-11-16 ENCOUNTER — TELEMEDICINE (OUTPATIENT)
Dept: INTERNAL MEDICINE | Facility: CLINIC | Age: 76
End: 2021-11-16

## 2021-11-16 DIAGNOSIS — J30.9 ALLERGIC RHINITIS, UNSPECIFIED SEASONALITY, UNSPECIFIED TRIGGER: Primary | ICD-10-CM

## 2021-11-16 PROCEDURE — 99213 OFFICE O/P EST LOW 20 MIN: CPT | Performed by: NURSE PRACTITIONER

## 2021-11-16 RX ORDER — MOMETASONE FUROATE 50 UG/1
2 SPRAY, METERED NASAL DAILY
Qty: 17 G | Refills: 0 | Status: SHIPPED | OUTPATIENT
Start: 2021-11-16 | End: 2022-02-21

## 2021-11-16 NOTE — PROGRESS NOTES
"Subjective   Holli Iyer is a 76 y.o. female.   Chief Complaint   Patient presents with   • Allergies     You have chosen to receive care through a telehealth visit.  Do you consent to use a video/audio connection for your medical care today? Yes    Holli presents today related to \"allergy attacks\" that have been present over the past 2 months.  She states she starts to have frequent sneezing and runny nose.  She states attacks happen at least 1-2 times a week.  There is post nasal drip that is leading to a cough.  No shortness of breath or wheezing at this time.  No body aches, fever, or chills.  She started taking Zyrtec at 3am this morning.  She feels this is mildly helping.  No changes in taste or smell.  She has been vaccinated against COVID-19.  No known exposure to COVID-19.     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  She states she has been working with sheet rock, powders, glues, etc.  She does wears a mask when working with powdered substances.      The following portions of the patient's history were reviewed and updated as appropriate: allergies, current medications, past family history, past medical history, past social history, past surgical history and problem list.    Review of Systems   Constitutional: Negative for activity change, appetite change, fatigue, fever, unexpected weight gain and unexpected weight loss.   HENT: Positive for congestion (clear), postnasal drip and rhinorrhea. Negative for swollen glands, trouble swallowing and voice change.    Eyes: Negative for blurred vision and visual disturbance.   Respiratory: Positive for cough. Negative for shortness of breath.    Cardiovascular: Negative for chest pain, palpitations and leg swelling.   Gastrointestinal: Negative for abdominal pain, constipation, diarrhea, nausea, vomiting and indigestion.   Endocrine: Negative for cold intolerance, heat intolerance, polydipsia and polyphagia. "   Genitourinary: Negative for dysuria and frequency.   Musculoskeletal: Negative for arthralgias, back pain, joint swelling and neck pain.   Skin: Negative for color change, rash and skin lesions.   Allergic/Immunologic: Positive for environmental allergies.   Neurological: Negative for dizziness, weakness, headache, memory problem and confusion.   Hematological: Does not bruise/bleed easily.   Psychiatric/Behavioral: Negative for agitation, hallucinations and suicidal ideas. The patient is not nervous/anxious.        Objective   Past Medical History:   Diagnosis Date   • Colon polyp    • GERD (gastroesophageal reflux disease)    • Hyperthyroidism     pt states past issue    • Insomnia    • Irregular heart beat    • MVP (mitral valve prolapse)    • Sleep apnea       Past Surgical History:   Procedure Laterality Date   • BREAST BIOPSY Left    • COLONOSCOPY  02/12/2015   • ENDOSCOPY N/A 10/26/2017    Procedure: ESOPHAGOGASTRODUODENOSCOPY WITH ANESTHESIA;  Surgeon: Selena Thomas MD;  Location: [facility] ENDOSCOPY;  Service:         Current Outpatient Medications:   •  aspirin 81 MG EC tablet, Take 81 mg by mouth Daily., Disp: , Rfl:   •  atenolol (TENORMIN) 25 MG tablet, Take 1 tablet by mouth Daily., Disp: 10 tablet, Rfl: 0  •  citalopram (CeleXA) 10 MG tablet, Take 1 tablet by mouth Every Morning., Disp: 10 tablet, Rfl: 0  •  vitamin D (ERGOCALCIFEROL) 1.25 MG (15243 UT) capsule capsule, TAKE 1 CAPSULE TWICE A WEEK (Patient taking differently: 50,000 Units Every 7 (Seven) Days.), Disp: 24 capsule, Rfl: 3  •  mometasone (Nasonex) 50 MCG/ACT nasal spray, 2 sprays into the nostril(s) as directed by provider Daily., Disp: 17 g, Rfl: 0      There were no vitals filed for this visit.  There were no vitals filed for this visit.    There is no height or weight on file to calculate BMI.    Physical Exam  Constitutional:       General: She is not in acute distress.     Appearance: She is well-developed.   HENT:      Right Ear:  External ear normal.      Left Ear: External ear normal.   Neurological:      Mental Status: She is alert and oriented to person, place, and time.      Gait: Gait normal.   Psychiatric:         Mood and Affect: Mood normal.         Speech: Speech normal.         Behavior: Behavior normal.         Thought Content: Thought content normal.               Assessment/Plan   Diagnoses and all orders for this visit:    1. Allergic rhinitis, unspecified seasonality, unspecified trigger (Primary)  -     mometasone (Nasonex) 50 MCG/ACT nasal spray; 2 sprays into the nostril(s) as directed by provider Daily.  Dispense: 17 g; Refill: 0      Video visit lasted approximately 11 minutes.   Will continue on daily zyrtec daily and start Nasonex.  She has tried Flonase in the past and states this did not help her much.  I've discuss avoiding a big sniff and allowing medication to absorb in the nose, as well as start saline nasal spray.  Try to be mindful of possible exposures that may be contributing to symptoms.  If there is no improvement with regimen, can come to the office for further evaluation.  Briefly discussed allergist referral if needed.

## 2021-11-17 NOTE — PROGRESS NOTES
[facility] - PODIATRY    Today's Date: 11/18/21    Patient Name: Holli Iyer  MRN: [id number]  CSN: [id number]  PCP: Gavino Sanchez MD  Referring Provider: No ref. provider found    SUBJECTIVE     Chief Complaint   Patient presents with   • Follow-up     pcp02/03/2021 2 month fu pain around toenail- pt states doing very well- pt denies pain- pt presents with slightly long nail, no other visible issues     HPI: Holli Iyer, a 76 y.o.female, comes to clinic as a(n) established patient complaining of ingrown toenail and complaining of painful toenails. Patient has h/o GERD, Hyperthyroid, Insomnia, MVP, Sleep Apnea. Patient is non-diabetic.  Patient states that toenails have been doing well since the last visit and are not overly problematic today and had considered cancelling appointment but was unsure of when she would get back in. No issues with previously noted bony deformities of the foot. Denies pain at the present time. Relates previous treatment(s) including slant back. Denies any constitutional symptoms. No other pedal complaints at this time.    Past Medical History:   Diagnosis Date   • Colon polyp    • GERD (gastroesophageal reflux disease)    • Hyperthyroidism     pt states past issue    • Insomnia    • Irregular heart beat    • MVP (mitral valve prolapse)    • Sleep apnea      Past Surgical History:   Procedure Laterality Date   • BREAST BIOPSY Left    • COLONOSCOPY  02/12/2015   • ENDOSCOPY N/A 10/26/2017    Procedure: ESOPHAGOGASTRODUODENOSCOPY WITH ANESTHESIA;  Surgeon: Selena Thomas MD;  Location: [facility] ENDOSCOPY;  Service:      Family History   Problem Relation Age of Onset   • Heart failure Mother 100   • Ulcerative colitis Mother    • Cancer Father    • Diabetes Brother      Social History     Socioeconomic History   • Marital status:    Tobacco Use   • Smoking status: Never Smoker   • Smokeless tobacco: Never Used   Vaping Use   • Vaping Use: Never used   Substance  and Sexual Activity   • Alcohol use: Yes     Comment: galss of wine rarley    • Drug use: No   • Sexual activity: Defer     Allergies   Allergen Reactions   • Penicillins Unknown (See Comments)     childhood   • Declomycin [Demeclocycline] Palpitations   • Sulfa Antibiotics Itching     Current Outpatient Medications   Medication Sig Dispense Refill   • aspirin 81 MG EC tablet Take 81 mg by mouth Daily.     • atenolol (TENORMIN) 25 MG tablet Take 1 tablet by mouth Daily. 10 tablet 0   • citalopram (CeleXA) 10 MG tablet Take 1 tablet by mouth Every Morning. 10 tablet 0   • mometasone (Nasonex) 50 MCG/ACT nasal spray 2 sprays into the nostril(s) as directed by provider Daily. 17 g 0   • vitamin D (ERGOCALCIFEROL) 1.25 MG (36882 UT) capsule capsule TAKE 1 CAPSULE TWICE A WEEK (Patient taking differently: 50,000 Units Every 7 (Seven) Days.) 24 capsule 3     No current facility-administered medications for this visit.     Review of Systems   Constitutional: Negative for chills and fever.   HENT: Negative for congestion.    Respiratory: Negative for shortness of breath.    Cardiovascular: Negative for chest pain and leg swelling.   Gastrointestinal: Negative for constipation, diarrhea, nausea and vomiting.   Musculoskeletal:        Foot pain   Skin: Negative for wound.   Neurological: Negative for numbness.       OBJECTIVE     Vitals:    11/18/21 1042   Pulse: 72   SpO2: 98%       PHYSICAL EXAM  GEN:   Accompanied by none.     Foot/Ankle Exam:       General:   Appearance: appears stated age and healthy    Orientation: AAOx3    Affect: appropriate    Gait: unimpaired    Assistance: independent    Shoe Gear:  Sandals    VASCULAR      Right Foot Vascularity   Dorsalis pedis:  2+  Posterior tibial:  2+  Skin Temperature: warm    Edema Grading:  None  CFT:  3  Pedal Hair Growth:  Present  Varicosities: mild varicosities       Left Foot Vascularity   Dorsalis pedis:  2+  Posterior tibial:  2+  Skin Temperature: warm    Edema  Grading:  None  CFT:  3  Pedal Hair Growth:  Present  Varicosities: mild varicosities        NEUROLOGIC     Right Foot Neurologic   Normal sensation    Light touch sensation:  Normal  Vibratory sensation:  Normal  Hot/Cold sensation: normal    Protective Sensation using [place] Monofilament:  10     Left Foot Neurologic   Normal sensation    Light touch sensation:  Normal  Vibratory sensation:  Normal  Hot/cold sensation: normal    Protective Sensation using [place] Monofilament:  10     MUSCULOSKELETAL      Right Foot Musculoskeletal   Ecchymosis:  None  Tenderness: toe 1    Arch:  Normal  Hammertoe:  Second toe  Hallux valgus: Yes (Medial bony eminence with mild abduction of hallux. Somewhat reducible. )       Left Foot Musculoskeletal   Ecchymosis:  None  Tenderness: toenails, toe 1 and toe 2    Arch:  Normal  Hammertoe:  Second toe  Hallux valgus: Yes (Medial bony eminence with mild abduction of hallux. Somewhat reducible.)       MUSCLE STRENGTH     Right Foot Muscle Strength   Foot dorsiflexion:  5  Foot plantar flexion:  5  Foot inversion:  5  Foot eversion:  5     Left Foot Muscle Strength   Foot dorsiflexion:  5  Foot plantar flexion:  5  Foot inversion:  5  Foot eversion:  5     RANGE OF MOTION      Right Foot Range of Motion   Foot and ankle ROM within normal limits       Left Foot Range of Motion   Foot and ankle ROM within normal limits       DERMATOLOGIC     Right Foot Dermatologic   Skin: skin intact    Nails: abnormally thick    Nails: no ingrown toenail       Left Foot Dermatologic   Skin: skin intact    Nails: abnormally thick, dystrophic nails and ingrown toenail (2nd toe medial border and hallux lateral)        RADIOLOGY/NUCLEAR:  No results found.    LABORATORY/CULTURE RESULTS:      PATHOLOGY RESULTS:       ASSESSMENT/PLAN     Diagnoses and all orders for this visit:    1. Ingrown toenail (Primary)    2. Thickened nails    3. Foot pain, left    4. Hallux valgus, left    5.  Hallux valgus, right    6. Hammer toes of both feet      Comprehensive lower extremity examination and evaluation was performed.  Discussed findings and treatment plan including risks, benefits, and treatment options with patient in detail. Patient agreed with treatment plan.  After verbal consent obtained, nail(s) x2 debrided of offending borders with nail nipper without incidence  Patient may maintain nails and calluses at home utilizing emery board or pumice stone between visits as needed   May benefit from PNA if  IGTNs continue to be problematic  Patient wishes to remain conservative in regard to bony deformities of the feet. Discussed again that these could be contributing to recurrent issues with ingrowing toenails.   An After Visit Summary was printed and given to the patient at discharge, including (if requested) any available informative/educational handouts regarding diagnosis, treatment, or medications. All questions were answered to patient/family satisfaction. Should symptoms fail to improve or worsen they agree to call or return to clinic or to go to the Emergency Department. Discussed the importance of following up with any needed screening tests/labs/specialist appointments and any requested follow-up recommended by me today. Importance of maintaining follow-up discussed and patient accepts that missed appointments can delay diagnosis and potentially lead to worsening of conditions.  Return if symptoms worsen or fail to improve., or sooner if acute issues arise.        This document has been electronically signed by Shorty Raymond DPM on November 18, 2021 11:38 CST

## 2021-11-18 ENCOUNTER — TREATMENT (OUTPATIENT)
Dept: PHYSICAL THERAPY | Facility: CLINIC | Age: 76
End: 2021-11-18

## 2021-11-18 ENCOUNTER — OFFICE VISIT (OUTPATIENT)
Dept: PODIATRY | Facility: CLINIC | Age: 76
End: 2021-11-18

## 2021-11-18 VITALS — HEART RATE: 72 BPM | WEIGHT: 203.6 LBS | OXYGEN SATURATION: 98 % | HEIGHT: 68 IN | BODY MASS INDEX: 30.86 KG/M2

## 2021-11-18 DIAGNOSIS — M70.50 PES ANSERINE BURSITIS: ICD-10-CM

## 2021-11-18 DIAGNOSIS — M25.561 CHRONIC PAIN OF BOTH KNEES: Primary | ICD-10-CM

## 2021-11-18 DIAGNOSIS — L60.2 THICKENED NAILS: ICD-10-CM

## 2021-11-18 DIAGNOSIS — M20.42 HAMMER TOES OF BOTH FEET: ICD-10-CM

## 2021-11-18 DIAGNOSIS — M79.672 FOOT PAIN, LEFT: ICD-10-CM

## 2021-11-18 DIAGNOSIS — L60.0 INGROWN TOENAIL: Primary | ICD-10-CM

## 2021-11-18 DIAGNOSIS — Z74.09 IMPAIRED MOBILITY: ICD-10-CM

## 2021-11-18 DIAGNOSIS — M20.41 HAMMER TOES OF BOTH FEET: ICD-10-CM

## 2021-11-18 DIAGNOSIS — M25.562 CHRONIC PAIN OF BOTH KNEES: Primary | ICD-10-CM

## 2021-11-18 DIAGNOSIS — M20.12 HALLUX VALGUS, LEFT: ICD-10-CM

## 2021-11-18 DIAGNOSIS — G89.29 CHRONIC PAIN OF BOTH KNEES: Primary | ICD-10-CM

## 2021-11-18 DIAGNOSIS — M20.11 HALLUX VALGUS, RIGHT: ICD-10-CM

## 2021-11-18 PROCEDURE — 11720 DEBRIDE NAIL 1-5: CPT | Performed by: PODIATRIST

## 2021-11-18 PROCEDURE — 97110 THERAPEUTIC EXERCISES: CPT

## 2021-11-18 PROCEDURE — 97112 NEUROMUSCULAR REEDUCATION: CPT

## 2021-11-18 NOTE — PROGRESS NOTES
Physical Therapy Treatment Note    Patient: Holli Iyer                                                                     Visit Date: 2021  :     1945    Referring practitioner:    Gavino Sanchez MD  Date of Initial Visit:          Type: THERAPY  Noted: 2021    Patient seen for 22 sessions    Visit Diagnoses:    ICD-10-CM ICD-9-CM   1. Chronic pain of both knees  M25.561 719.46    M25.562 338.29    G89.29    2. Pes anserine bursitis  M70.50 726.61   3. Impaired mobility  Z74.09 799.89     SUBJECTIVE      Subjective She reports her knees are both sore today. Pain in side is improving.     PAIN: 2/10 (B knees) > 0/10     OBJECTIVE     Objective     Therapeutic Exercises    31401 Comments   Unicam, seat 3, resistance level 4 6 min   B unilateral TKE on natilus, #13.3 2 x 10 ea   Shuttle press with B and B unilateral LE  2 x 10 ea (8 cords)        Timed Minutes 23      Neuromuscular Reeducation     75759 Comments   M/L and A/P WS on wobble board in // bars 2 x 10    B anterior cone taps (1) on M/L wobble board 2 x 10 ea -- // bars   B tandem stance rebound throws, 4 lb med ball 2 x 10 ea   B SLS rotation throws to rebounder, 4 lb med ball 1 x 10 ea       Timed Minutes 17     Therapy Education/Self Care 67436   Details:    Given Home Exercise Program  [employer] HEP (access code [email or web address])   Progress: Reinforced   Education provided to:  Patient   Level of understanding Verbalized   Timed Minutes      Access Code: [email or web address]  Date: 2021  Prepared by: Zahira Villar    Exercises  Clam with Resistance - 1-2 x daily - 7 x weekly - 2 sets - 10 reps  Prone Hip Extension - 1-2 x daily - 7 x weekly - 2 sets - 10 reps  Prone Quad Stretch with Strap - 1-2 x daily - 7 x weekly - 1 sets - 3 reps - 15-20 hold  Resisted Dorsiflexion Stretch with TheraBand - 1-2 x daily - 7 x weekly - 3 sets - 30 sec ea hold  Gastroc Stretch on Wall - 1-2 x daily -  7 x weekly - 3 sets - 30 sec ea hold  Standing March with Counter Support - 1-2 x daily - 7 x weekly - 2 sets - 10 reps     Total Timed Treatment:     40   mins  Total Time of Visit:             40   mins         ASSESSMENT/PLAN     GOALS   Goals                                             Progress Note due by 11/27/21                                                                Recert due by 11/28/21   STG by: 3 weeks  Comments Date Status   Patient will demonstrate >25 deg B hip passive IR  R 25 degrees Post mobs  L 26 post mobs  10/21/21 Met   Patient will demonstrate WFL B patellar mobility  Mobility was WNL today bilaterally  11/4/21 Met    Patient will demonstrate ability to maintain tandem standing for >20 seconds with no swaying noted    30 sec B LE -- 9/27/21 11/18/21: challenged with dynamic surfaces, decreased stability, but safe 11/18/21 Met on 9/27   LTG by: 6 weeks          Patient will demonstrate independence with comprehensive HEP Reinforced good posture during work simulated activities and ADLs.  11/1/21 ongoing   Patient will demonstrate 5/5 B hip strength  10/28/21:  Flexion: (R) 5/5 and (L) 5/5  Abd: (L) 4+/5 and 4+/5  Ext: (L) 3+/5 and (R) 4-/5  Glute max : L 3+/5, R 4-/5  11/8/21: dec endurance , tania on L 10/28/21 ongoing   Patient will demonstrate 5/5 B knee strength   B knee strength for flex and ext: 5/5 9/30/21 Met    Patient will report worst pain being <3/10 on average for 1 week No pain reported this session or last for entire duration. Reported pain at worse 1/10 on 10/11/21. Pt report functional mobility also improving.  10/14/21 Met    Patient will score >55 on LEFS  43 on 8/30/2021 (eval)   57 on 9/30/2021 9/31/21 Met     Assessment/Plan     ASSESSMENT: Demonstrated decreased quad control and strength today, in which was a primary focus. We have been focusing on her hip strength as this has been a primary contributor to reason for trips and subsequent falls, which is improving.  Her balance continues to challenge and, while improved, hip stability does contribute to this.     PLAN: Continue prioritize balance, especially with dynamic surfaces, and address quad control/eccentric strengthening, time permitting. Consider preparing for potentially placing POC on hold during following session.     Signature: Zahira Villar PTA, License #: [id number]    Electronically Signed on 11/18/2021          [address] [place] [address]  [phone]

## 2021-11-22 ENCOUNTER — TREATMENT (OUTPATIENT)
Dept: PHYSICAL THERAPY | Facility: CLINIC | Age: 76
End: 2021-11-22

## 2021-11-22 DIAGNOSIS — M25.561 CHRONIC PAIN OF BOTH KNEES: Primary | ICD-10-CM

## 2021-11-22 DIAGNOSIS — G89.29 CHRONIC PAIN OF BOTH KNEES: Primary | ICD-10-CM

## 2021-11-22 DIAGNOSIS — M25.562 CHRONIC PAIN OF BOTH KNEES: Primary | ICD-10-CM

## 2021-11-22 DIAGNOSIS — M70.50 PES ANSERINE BURSITIS: ICD-10-CM

## 2021-11-22 DIAGNOSIS — Z74.09 IMPAIRED MOBILITY: ICD-10-CM

## 2021-11-22 PROCEDURE — 97112 NEUROMUSCULAR REEDUCATION: CPT | Performed by: PHYSICAL THERAPIST

## 2021-11-22 PROCEDURE — 97110 THERAPEUTIC EXERCISES: CPT | Performed by: PHYSICAL THERAPIST

## 2021-11-22 NOTE — PROGRESS NOTES
Physical Therapy Treatment Note    Patient: Holli Iyer                                                                     Visit Date: 2021  :     1945    Referring practitioner:    Gavino Sanchez MD  Date of Initial Visit:          Type: THERAPY  Noted: 2021    Patient seen for 23 sessions    Visit Diagnoses:    ICD-10-CM ICD-9-CM   1. Chronic pain of both knees  M25.561 719.46    M25.562 338.29    G89.29    2. Pes anserine bursitis  M70.50 726.61   3. Impaired mobility  Z74.09 799.89     SUBJECTIVE      Subjective She kind of wrenched her knee this morning. She may have stepped wrong. It's feeling better now than it was. She denies and pain or falls at this time.     PAIN: 0/10  > 0/10     OBJECTIVE     Objective     Therapeutic Exercises    94889 Comments   B knee ext machine eccentric 13# 1x10 each   B unilateral eccentric shuttle press (4 bands)  1x10 each            Timed Minutes 10     Neuromuscular Reeducation     25203 Comments   B unilateral cone taps: forward, lateral, and cross over lateral 1x10 each direction   B unilateral cone taps: forward, lateral, and cross over lateral: on blue oval foam    B unilateral cone taps: forward, lateral, and cross over lateral: on A/P wobble board    B unilateral cone taps: forward, lateral, and cross over lateral: on med/lat wobble board    B alternating cone taps w/ cognitive listing category challenges Alphabet, vegetables, candy, and animals of the rainforrest           Timed Minutes 30     Therapy Education/Self Care 04704   Details:    Given Home Exercise Program  [employer] HEP (access code [email or web address])   Progress: Reinforced   Education provided to:  Patient   Level of understanding Verbalized   Timed Minutes      Access Code: [email or web address]  Date: 2021  Prepared by: Zahira Villar    Exercises  Clam with Resistance - 1-2 x daily - 7 x weekly - 2 sets - 10 reps  Prone Hip Extension  - 1-2 x daily - 7 x weekly - 2 sets - 10 reps  Prone Quad Stretch with Strap - 1-2 x daily - 7 x weekly - 1 sets - 3 reps - 15-20 hold  Resisted Dorsiflexion Stretch with TheraBand - 1-2 x daily - 7 x weekly - 3 sets - 30 sec ea hold  Gastroc Stretch on Wall - 1-2 x daily - 7 x weekly - 3 sets - 30 sec ea hold  Standing March with Counter Support - 1-2 x daily - 7 x weekly - 2 sets - 10 reps     Total Timed Treatment:     45   mins  Total Time of Visit:             45   mins         ASSESSMENT/PLAN     GOALS   Goals                                             Progress Note due by 11/27/21                                                                Recert due by 11/28/21   STG by: 3 weeks  Comments Date Status   Patient will demonstrate >25 deg B hip passive IR  R 25 degrees Post mobs  L 26 post mobs  10/21/21 Met   Patient will demonstrate WFL B patellar mobility  Mobility was WNL today bilaterally  11/4/21 Met    Patient will demonstrate ability to maintain tandem standing for >20 seconds with no swaying noted    30 sec B LE -- 9/27/21 11/18/21: challenged with dynamic surfaces, decreased stability, but safe 11/18/21 Met on 9/27   LTG by: 6 weeks          Patient will demonstrate independence with comprehensive HEP Reinforced good posture during work simulated activities and ADLs.  11/1/21 ongoing   Patient will demonstrate 5/5 B hip strength  10/28/21:  Flexion: (R) 5/5 and (L) 5/5  Abd: (L) 4+/5 and 4+/5  Ext: (L) 3+/5 and (R) 4-/5  Glute max : L 3+/5, R 4-/5  11/8/21: dec endurance , tania on L 10/28/21 ongoing   Patient will demonstrate 5/5 B knee strength   B knee strength for flex and ext: 5/5 9/30/21 Met    Patient will report worst pain being <3/10 on average for 1 week No pain reported this session or last for entire duration. Reported pain at worse 1/10 on 10/11/21. Pt report functional mobility also improving.  10/14/21 Met    Patient will score >55 on LEFS  43 on 8/30/2021 (eval)   57 on 9/30/2021  9/31/21 Met     Assessment/Plan     ASSESSMENT: Today we prioritized her balance and she did struggle w/ A/P wobble board. She actually did very good w/ challenged cognitively. We then, worked on improving her B eccentric quad control. She did report fatigue throughout.     PLAN: Assess all goals for progress note and discuss possible POC on hold or reducing frequency down to 1x every other week. Also look and see what insurance allows.     Signature: Mague Delarosa PTA, License #: [id number]    Electronically Signed on 11/18/2021          [address] [place] [address]. [address]  [phone]

## 2021-11-29 ENCOUNTER — TREATMENT (OUTPATIENT)
Dept: PHYSICAL THERAPY | Facility: CLINIC | Age: 76
End: 2021-11-29

## 2021-11-29 DIAGNOSIS — G89.29 CHRONIC PAIN OF BOTH KNEES: Primary | ICD-10-CM

## 2021-11-29 DIAGNOSIS — Z74.09 IMPAIRED MOBILITY: ICD-10-CM

## 2021-11-29 DIAGNOSIS — M70.50 PES ANSERINE BURSITIS: ICD-10-CM

## 2021-11-29 DIAGNOSIS — M25.562 CHRONIC PAIN OF BOTH KNEES: Primary | ICD-10-CM

## 2021-11-29 DIAGNOSIS — M25.561 CHRONIC PAIN OF BOTH KNEES: Primary | ICD-10-CM

## 2021-11-29 PROCEDURE — 97110 THERAPEUTIC EXERCISES: CPT

## 2021-11-29 PROCEDURE — 97140 MANUAL THERAPY 1/> REGIONS: CPT

## 2021-11-29 PROCEDURE — 97112 NEUROMUSCULAR REEDUCATION: CPT

## 2021-11-29 NOTE — PROGRESS NOTES
Physical Therapy Treatment Note and 90 Day Recertification Note    Patient: Holli Iyer                                                                     Visit Date: 2021  :     1945    Referring practitioner:    Gavino Sanchez MD  Date of Initial Visit:          Type: THERAPY  Noted: 2021    Patient seen for 24 sessions    Visit Diagnoses:    ICD-10-CM ICD-9-CM   1. Chronic pain of both knees  M25.561 719.46    M25.562 338.29    G89.29    2. Pes anserine bursitis  M70.50 726.61   3. Impaired mobility  Z74.09 799.89     SUBJECTIVE      Subjective Since starting therapy, she feels her balance is getting better however not what it should be. She continues to have B knee pain, which has increased since the weather has gotten colder. 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     PAIN: 0/10 currently, 4/10 when up moving      OBJECTIVE     Objective     Manual Therapy     [id number]  Comments   B patellar mobilizations in all planes B hypomobility noted, especially M/L   STM manually B medial knees Light-mod pressure, moderate guarding noted                Timed Minutes 25       Therapeutic Exercises    33887 Comments   Standing hip flexion/abd/ext with TRX straps  3 sets x 5 reps each direction    B hip MMT     B hamstring stretch Good extensibility        Timed Minutes 10     Neuromuscular Reeducation     14543 Comments   Static standing on wobble board M/L, EO and EC Decreased weight shifting over LLE, Min-Mod A to maintain upright balance with EC    Mini squats on M/L wobble board with TRX straps     Tandem standing, solid surface Mild swaying    Tandem standing on pillow  Moderate swaying                Timed Minutes 20     Therapy Education/Self Care 70232   Details: Added tandem standing on pillow to HEP    Given Home Exercise Program  [employer] HEP (access code [email or web address])   Progress: Reinforced    Education provided to:  Patient   Level of understanding Verbalized   Timed Minutes      Access Code: [email or web address]  Date: 11/04/2021  Prepared by: Zahira Villar    Exercises  Clam with Resistance - 1-2 x daily - 7 x weekly - 2 sets - 10 reps  Prone Hip Extension - 1-2 x daily - 7 x weekly - 2 sets - 10 reps  Prone Quad Stretch with Strap - 1-2 x daily - 7 x weekly - 1 sets - 3 reps - 15-20 hold  Resisted Dorsiflexion Stretch with TheraBand - 1-2 x daily - 7 x weekly - 3 sets - 30 sec ea hold  Gastroc Stretch on Wall - 1-2 x daily - 7 x weekly - 3 sets - 30 sec ea hold  Standing March with Counter Support - 1-2 x daily - 7 x weekly - 2 sets - 10 reps     Total Timed Treatment:     55   mins  Total Time of Visit:             55   mins         ASSESSMENT/PLAN     GOALS   Goals                                             Progress Note due by 12/29/21                                                                Recert due by 2/26/22   STG by: 3 weeks  Comments Date Status   Patient will demonstrate >25 deg B hip passive IR  R 25 degrees Post mobs  L 26 post mobs  10/21/21 Met   Patient will demonstrate WFL B patellar mobility  B hypomobility noted today, primarily medial/lateral  11/29/21 Previously met, now ongoing    Patient will maintain NBOS with EC for 15 seconds independently  11/29/21 NEW   Patient will demonstrate ability to maintain tandem standing for >20 seconds with no swaying noted    30 sec B LE -- 9/27/21 11/18/21: challenged with dynamic surfaces, decreased stability, but safe 11/18/21 Met on 9/27   LTG by: 6 weeks          Patient will demonstrate independence with comprehensive HEP Reinforced good posture during work simulated activities and ADLs.  11/1/21 ongoing   Patient will demonstrate 5/5 B hip strength  Flexion: (R) 5/5 and (L) 5/5  Abd: (L) 4+/5 and 4+/5  Ext: (L) 4/5 and (R) 4/5  Glute max : L 4/5, R 4-5   11/29/21 ongoing   Patient will demonstrate 5/5 B knee strength   B knee strength for  flex and ext: 5/5 9/30/21 Met    Patient will report worst pain being <3/10 on average for 1 week No pain reported this session or last for entire duration. Reported pain at worse 1/10 on 10/11/21. Pt report functional mobility also improving.  10/14/21 Met    Patient will score >55 on LEFS  43 on 8/30/2021 (eval)   57 on 9/30/2021 9/31/21 Met     Assessment & Plan     Assessment  Impairments: abnormal gait, abnormal or restricted ROM, impaired balance, impaired physical strength, lacks appropriate home exercise program and pain with function  Functional Limitations: walking, uncomfortable because of pain and standingPrognosis: good    Plan  Therapy options: will be seen for skilled therapy services  Planned modality interventions: dry needling, low level laser therapy, TENS, electrical stimulation/Russian stimulation, cryotherapy and thermotherapy (hydrocollator packs)  Planned therapy interventions: balance/weight-bearing training, functional ROM exercises, gait training, home exercise program, joint mobilization, manual therapy, neuromuscular re-education, postural training, soft tissue mobilization, strengthening, stretching and therapeutic activities  Frequency: 1x week  Duration in visits: 4  Duration in weeks: 4  Treatment plan discussed with: patient         ASSESSMENT: Ms. Iyer has progressed well with PT thus far, she has met or partially met 6 of her goals at this point. Today she presented with increased knee pain and muscle guarding, likely related to her falling asleep last night with her legs extended on her coffee table. When she woke up, she was so stiff she could barely walk. Therefore that explains her B patellar hypomobility that she presented with this date. She is interested in try a dry needling trial along her B medial knees to see if that helps with her knee pain as she continues to have pain upon standing. Her balance is improving, however noted to heavily rely on vision for upright  balance. With static standing on wobble board and eyes closed, she immediatley lost her balance to the L requiring Mod A for recovery. Recommending she continue for the next 4 weeks to allow us to work with her on her balance as she feels unsafe in dark rooms and closing her eyes in her shower due to decreased balance. This will decrease her falls risk and also continue to address hip stability which will take stress off of her B knees.     PLAN: Address B knee mobility and soft tissue restrictions utilizing manual therapy as needed. Work on challenging her balance taking out vision to allow for safer mobility in dark rooms.     Signature: Shaina Martinez, PT, DPT License #: [id number]    Electronically Signed on 11/29/2021    Clinical Progress: improved  Home Program Compliance: Yes  Progress toward previous goals: Partially Met      90 Day Recertification  Certification Period: 11/29/2021 through 2/26/2022  I certify that the therapy services are furnished while this patient is under my care.  The services outlined above are required by this patient, and will be reviewed every 90 days.     PHYSICIAN:     Gavino Sanchez MD______________________________________DATE: _________    Please sign and return via fax to [phone].   Thank you so much for letting us work with Holli. I appreciate your letting us work with your patients. If you have any questions or concerns, please don't hesitate to contact me.                  [address] [address]  [phone]

## 2021-12-08 ENCOUNTER — TREATMENT (OUTPATIENT)
Dept: PHYSICAL THERAPY | Facility: CLINIC | Age: 76
End: 2021-12-08

## 2021-12-08 DIAGNOSIS — M25.562 CHRONIC PAIN OF BOTH KNEES: Primary | ICD-10-CM

## 2021-12-08 DIAGNOSIS — M25.561 CHRONIC PAIN OF BOTH KNEES: Primary | ICD-10-CM

## 2021-12-08 DIAGNOSIS — Z74.09 IMPAIRED MOBILITY: ICD-10-CM

## 2021-12-08 DIAGNOSIS — M70.50 PES ANSERINE BURSITIS: ICD-10-CM

## 2021-12-08 DIAGNOSIS — G89.29 CHRONIC PAIN OF BOTH KNEES: Primary | ICD-10-CM

## 2021-12-08 PROCEDURE — 97110 THERAPEUTIC EXERCISES: CPT | Performed by: PHYSICAL THERAPIST

## 2021-12-08 PROCEDURE — 97140 MANUAL THERAPY 1/> REGIONS: CPT | Performed by: PHYSICAL THERAPIST

## 2021-12-08 NOTE — PROGRESS NOTES
Physical Therapy Treatment Note    Patient: Holli Iyer                                                                     Visit Date: 2021  :     1945    Referring practitioner:    Gavino Sanchez MD  Date of Initial Visit:          Type: THERAPY  Noted: 2021    Patient seen for 25 sessions    Visit Diagnoses:    ICD-10-CM ICD-9-CM   1. Chronic pain of both knees  M25.561 719.46    M25.562 338.29    G89.29    2. Pes anserine bursitis  M70.50 726.61   3. Impaired mobility  Z74.09 799.89     SUBJECTIVE     Subjective It hurts really bad if she catches her foot and her (L) knee with roll (laterally). Overall, feels pain is improving.    PAIN: 0/10 > 0/10     OBJECTIVE     Objective      Manual Therapy     04698  Comments   IASTM with homedic, reclined B medial knees and distal quads   B patellar mobilizations in all planes Improved mobility (L more restricted > R)       Timed Minutes 25     Therapeutic Exercises    11107 Comments   B ankle ROM  PROM: 15 deg bilaterally   AROM: L: 1 deg and R: 0 deg   Assessed pelvic alignment, true leg length vs apparent leg length  R iliac crest elevated/anteriorly rotated  Measurement from umbilicus:   R: 100 cm and L: 101 cm  R: 90.5 cm and L: 91.5 cm   Assessed for functional scoliosis  R scapular mal alignment, but no significant functional scoliosis noted   *provided with (size small, all three layers - none removed) heel lift on R Educated to intermittently use. Gait improved with heel lift, as did posture. More equal stance phase, little-no hip drop, equal heel strike. Improved posture as well.    MET for R elevated / anteriorly rotated iliac crest Unresolved following   B isometric hip abduction/adduction squeezes 1 x 5 ea       Timed Minutes 16     Therapy Education/Self Care 50015   Details: Heel lift, apparent vs true leg length discrepancy, posture, gait mechanics, functional scoliosis    Given [employer] HEP (access code [email or web address])  symptom relief  mobility training   Progress: New   Education provided to:  Patient   Level of understanding Verbalized   Timed Minutes      Access Code: [email or web address]  Date: 11/04/2021  Prepared by: Zahira Villar     Exercises  Clam with Resistance - 1-2 x daily - 7 x weekly - 2 sets - 10 reps  Prone Hip Extension - 1-2 x daily - 7 x weekly - 2 sets - 10 reps  Prone Quad Stretch with Strap - 1-2 x daily - 7 x weekly - 1 sets - 3 reps - 15-20 hold  Resisted Dorsiflexion Stretch with TheraBand - 1-2 x daily - 7 x weekly - 3 sets - 30 sec ea hold  Gastroc Stretch on Wall - 1-2 x daily - 7 x weekly - 3 sets - 30 sec ea hold  Standing March with Counter Support - 1-2 x daily - 7 x weekly - 2 sets - 10 reps    Total Timed Treatment:     41   mins  Total Time of Visit:             41   mins         ASSESSMENT/PLAN     GOALS  Goals                                             Progress Note due by 12/29/21                                                                Recert due by 2/26/22   STG by: 3 weeks  Comments Date Status   Patient will demonstrate >25 deg B hip passive IR  R 25 degrees Post mobs  L 26 post mobs  10/21/21 Met   Patient will demonstrate WFL B patellar mobility  B patellar hypomobility continues but improved. Limited L > R 12/8/21 Previously met, now ongoing    Patient will maintain NBOS with EC for 15 seconds independently   11/29/21 ongoing   Patient will demonstrate ability to maintain tandem standing for >20 seconds with no swaying noted    30 sec B LE -- 9/27/21 11/18/21: challenged with dynamic surfaces, decreased stability, but safe 11/18/21 Met on 9/27   LTG by: 6 weeks          Patient will demonstrate independence with comprehensive HEP Reinforced good posture during work simulated activities and ADLs.  11/1/21 ongoing   Patient will demonstrate 5/5 B hip strength  Flexion: (R) 5/5 and (L) 5/5  Abd: (L) 4+/5 and 4+/5  Ext: (L) 4/5 and (R) 4/5  Glute  max : L 4/5, R 4-5    11/29/21 ongoing   Patient will demonstrate 5/5 B knee strength   B knee strength for flex and ext: 5/5 9/30/21 Met    Patient will report worst pain being <3/10 on average for 1 week No pain reported this session or last for entire duration. Reported pain at worse 1/10 on 10/11/21. Pt report functional mobility also improving.  10/14/21 Met    Patient will score >55 on LEFS  43 on 8/30/2021 (eval)   57 on 9/30/2021 9/31/21 Met     Assessment/Plan     ASSESSMENT: Patient presents with R elevated and anteriorly rotated iliac crest, which did not resolve with MET today. Therefore, assessed for true leg length discrepancy and functional scoliosis. She demonstrated a 1-1.5 cm difference, with R consistently being shorter; therefore, I provided pt with a heel lift. Both gait and posture improved when ambulating with heel lift, demonstrating little to no hip drop, equal stance phase, and improved posture. It is possible heel pain is a result of increased heel strike resulting from unequal leg length and also playing role in tripping. She was able to ambulate with forward R LE vs toe out as noted previously. Additionally, she demonstrated R scapular mal-alignment, likely resulting from habitually poor posture while working. While it does play a role in her gait, I believe this to be a separate issue and that she may benefit from focus on postural strength/scapular position under different POC if patient is concerned or reports issue with this.     PLAN: Assess response to heel lift and consider addressing gait mechanics while wearing it. Consider prioritizing balance, bolster HEP to reflect hip strengthening and balance as appropriate.     SIGNATURE: Zahira Villar PTA, License #: [id number]    Electronically Signed on 12/8/2021        [address] [facility] [place] [address]  [phone]

## 2021-12-08 NOTE — PROGRESS NOTES
"                                                                        Physical Therapy Therapy Dry Needle Treatment Note    Patient: Holli Iyer                                                                                Today's Date: 2021    : 1945  Date of Initial Visit: Type: THERAPY  Noted: 2021  Patient seen for 25 sessions    Visit Diagnoses:    ICD-10-CM ICD-9-CM   1. Chronic pain of both knees  M25.561 719.46    M25.562 338.29    G89.29    2. Pes anserine bursitis  M70.50 726.61   3. Impaired mobility  Z74.09 799.89       SUBJECTIVE   Subjective  SUBJECTIVE: See Zahira Villar PTA treatment note for subjective and pain information.    OBJECTIVE   Objective    Dry Needle Location [ (1-2 muscles)/ (3 or more muscles)]   Location Depth (\") Comment   B knee medial and lateral joint line 1    B greater saphenous  2    2\" below greater saphenous  2    Medial symptomatic point  2           TRIAL     Total Timed Treatment:     0   mins  Total Visit Time:     15   mins    Education: Patient instructed on the expected effects and possible reactions of dry needling. The patient reported understanding of this.     ASSESSMENT/PLAN     Assessment/Plan    ASSESSMENT: A trial of dry needling was performed this date, no adverse reactions, skin intact, some relief was noted initially right after the session.    PLAN: Continue with Dry Needling as requested.      SIGNATURE: Catracho Salomon PT DPT, License #: [id number]  Electronically Signed on 2021      "

## 2021-12-21 ENCOUNTER — TREATMENT (OUTPATIENT)
Dept: PHYSICAL THERAPY | Facility: CLINIC | Age: 76
End: 2021-12-21

## 2021-12-21 DIAGNOSIS — Z74.09 IMPAIRED MOBILITY: ICD-10-CM

## 2021-12-21 DIAGNOSIS — M70.50 PES ANSERINE BURSITIS: ICD-10-CM

## 2021-12-21 DIAGNOSIS — G89.29 CHRONIC PAIN OF BOTH KNEES: Primary | ICD-10-CM

## 2021-12-21 DIAGNOSIS — M25.561 CHRONIC PAIN OF BOTH KNEES: Primary | ICD-10-CM

## 2021-12-21 DIAGNOSIS — M25.562 CHRONIC PAIN OF BOTH KNEES: Primary | ICD-10-CM

## 2021-12-21 PROCEDURE — 20561 NDL INSJ W/O NJX 3+ MUSC: CPT | Performed by: PHYSICAL THERAPIST

## 2021-12-21 NOTE — PROGRESS NOTES
"                                                                        Physical Therapy Therapy Dry Needle Treatment Note    Patient: Holli Iyer                                                                                Today's Date: 2021    : 1945  Date of Initial Visit: Type: THERAPY  Noted: 2021  Patient seen for 1 sessions    Visit Diagnoses:    ICD-10-CM ICD-9-CM   1. Chronic pain of both knees  M25.561 719.46    M25.562 338.29    G89.29    2. Pes anserine bursitis  M70.50 726.61   3. Impaired mobility  Z74.09 799.89       SUBJECTIVE   Subjective  SUBJECTIVE: She reports relief for several days after DN last visit. Medial knee pain (B). The lift in her shoe has helped as well.     OBJECTIVE   Objective    Dry Needle Location [ (1-2 muscles)/ (3 or more muscles)]   Location Depth (\") Comment   B knee medial and lateral joint line 1    B greater saphenous  2    2\" below greater saphenous (B) 2    Common fibular (B) 1                Modalities Comments   ES-130 estim Mode: L; freq; 6  Channel 1: saphenous to 2' below; L3  Channel 2: saphenous to 2' below L3         Minutes 15          Total Timed Treatment:     25  mins  Total Visit Time:     25  mins    Education: Patient instructed on the expected effects and possible reactions of dry needling. The patient reported understanding of this.     ASSESSMENT/PLAN     Assessment/Plan    ASSESSMENT: We performed stim with DN today. no adverse reactions, skin intact, some relief was noted initially right after the session. I did review heel lifts after she asked about ordering another one.     PLAN: Continue with Dry Needling as requested.      SIGNATURE: Nancy Sue PT DPT, License #: [id number]  Electronically Signed on 2021      "

## 2021-12-22 ENCOUNTER — TREATMENT (OUTPATIENT)
Dept: PHYSICAL THERAPY | Facility: CLINIC | Age: 76
End: 2021-12-22

## 2021-12-22 DIAGNOSIS — G89.29 CHRONIC PAIN OF BOTH KNEES: Primary | ICD-10-CM

## 2021-12-22 DIAGNOSIS — M70.50 PES ANSERINE BURSITIS: ICD-10-CM

## 2021-12-22 DIAGNOSIS — M25.561 CHRONIC PAIN OF BOTH KNEES: Primary | ICD-10-CM

## 2021-12-22 DIAGNOSIS — M25.562 CHRONIC PAIN OF BOTH KNEES: Primary | ICD-10-CM

## 2021-12-22 DIAGNOSIS — Z74.09 IMPAIRED MOBILITY: ICD-10-CM

## 2021-12-22 PROCEDURE — 97110 THERAPEUTIC EXERCISES: CPT

## 2021-12-22 PROCEDURE — 97112 NEUROMUSCULAR REEDUCATION: CPT

## 2021-12-22 NOTE — PROGRESS NOTES
Physical Therapy Treatment Note    Patient: Holli Iyer                                                                     Visit Date: 2021  :     1945    Referring practitioner:    Gavino Sanchez MD  Date of Initial Visit:          Type: THERAPY  Noted: 2021    Patient seen for 26 sessions    Visit Diagnoses:    ICD-10-CM ICD-9-CM   1. Chronic pain of both knees  M25.561 719.46    M25.562 338.29    G89.29    2. Pes anserine bursitis  M70.50 726.61   3. Impaired mobility  Z74.09 799.89     SUBJECTIVE     Subjective She reports the heel lift has helped her significantly and has felt more balanced. She feels her balance is her concern at this time. She has noticed she has been able to rise straight up from sitting without groaning and need for use of both sides of her chair. She returns to PCP at beginning of new year.     PAIN: 0/10 > 0/10     OBJECTIVE     Objective      Neuromuscular Reeducation     [id number] Comments   rhomberg stance with EO and EC >30 sec ea, min sway with EC   Tandem stance on BL TheraDisc (EO)  R posterior: >30 sec, L posterior: 22 sec   B SLS on BL TheraDisc with cone taps (1) 2 x 10 ea   B SLS rotation throws against rebounder with 4 lb med ball  2 x 10 ea       Timed Minutes 25     Therapeutic Exercises    08313 Comments   B SL hip ext over 45 cm physioball  2 x 10 -- pt req this, reports feels very good   B SL hip abduction (SLR) with 2.5 lb ankle wt 2 x 10 ea   B hip abduction MMT L: 5/5 and R: 4+/5   Modified bird dogs against raise tx table 2 x 10    B glut max MMT Glut max: L: 4/5 and 4/5   Bolstered and reviewed HEP        Timed Minutes 13     Therapy Education/Self Care 41590   Details: Pt may return to PCP for new POC if balance becomes a safety hazard.    Given [employer] HEP (access code [email or web address])  symptom relief  mobility training   Progress: New   Education provided to:  Patient   Level of  understanding Verbalized   Timed Minutes      Access Code: [email or web address]  Date: 12/22/2021  Prepared by: Zahira Villar    Exercises  Clam with Resistance - 1-2 x daily - 7 x weekly - 2 sets - 10 reps  Prone Hip Extension - 1-2 x daily - 7 x weekly - 2 sets - 10 reps  Prone Quad Stretch with Strap - 1-2 x daily - 7 x weekly - 1 sets - 3 reps - 15-20 hold  Resisted Dorsiflexion Stretch with TheraBand - 1-2 x daily - 7 x weekly - 3 sets - 30 sec ea hold  Gastroc Stretch on Wall - 1-2 x daily - 7 x weekly - 3 sets - 30 sec ea hold  Standing March with Counter Support - 1-2 x daily - 7 x weekly - 2 sets - 10 reps  Sidelying Hip Extension in Abduction - 1-2 x daily - 7 x weekly - 2 sets - 10 reps  Standing Single Leg Stance with Counter Support - 1-2 x daily - 7 x weekly - 3 sets - 20 sec hold    Total Timed Treatment:     38   mins  Total Time of Visit:             45   mins         ASSESSMENT/PLAN     GOALS  Goals                                             Progress Note due by 12/29/21                                                                Recert due by 2/26/22   STG by: 3 weeks  Comments Date Status   Patient will demonstrate >25 deg B hip passive IR  R 25 degrees Post mobs  L 26 post mobs  10/21/21 Met   Patient will demonstrate WFL B patellar mobility  B patellar hypomobility continues but improved. Limited L > R 12/8/21 Previously met, now ongoing    Patient will maintain NBOS with EC for 15 seconds independently >30 sec with min sway 12/22/21 Met   Patient will demonstrate ability to maintain tandem standing for >20 seconds with no swaying noted    30 sec B LE -- 9/27/21 12/22/21: challenged tandem in balance with static and dynamic surfaces, demonstrated very min sway only w/ EC 12/22/21 Met    LTG by: 6 weeks          Patient will demonstrate independence with comprehensive HEP Bolstered today to include SLS so she can continue independent progression at home.  12/22/21 Met    Patient will demonstrate 5/5 B  hip strength  11/29/21:  Flexion: (R) 5/5 and (L) 5/5  Abd: (L) 4+/5 and 4+/5  Ext: (L) 4/5 and (R) 4/5  Glute max : L 4/5, R 4-5   12/22/21:  Abd: L: 5/5 and R: 4+/5  Glut max: L: 4/5 and 4/5 12/22/21 ongoing   Patient will demonstrate 5/5 B knee strength   B knee strength for flex and ext: 5/5 9/30/21 Met    Patient will report worst pain being <3/10 on average for 1 week No pain reported this session or last for entire duration. Reported pain at worse 1/10 on 10/11/21. Pt report functional mobility also improving.  10/14/21 Met    Patient will score >55 on LEFS  43 on 8/30/2021 (eval)   57 on 9/30/2021 9/31/21 Met     Assessment/Plan     ASSESSMENT: Patient presents today with significantly improved gait pattern and overall static balance since given heel lift, noting significant improvements herself. She does demonstrate decreased ankle DF AROM as noted last session and continues to demonstrate decreased ankle proprioception, primarily with SLS. I bolstered her HEP today to reflect independent progression with SLS at home. She is primarily concerned about her balance at this juncture; however, as she has met seven and partially met one of her nine goals and is likely ready for d/c.     PLAN: PN due, but consider d/c from this POC and assess whether a new POC for ankle mobility and balance deficits would be appropriate.     SIGNATURE: Zahira Villar PTA, License #: [id number]    Electronically Signed on 12/22/2021        [place] [address]  [phone]

## 2021-12-28 ENCOUNTER — TREATMENT (OUTPATIENT)
Dept: PHYSICAL THERAPY | Facility: CLINIC | Age: 76
End: 2021-12-28

## 2021-12-28 DIAGNOSIS — M25.562 CHRONIC PAIN OF BOTH KNEES: Primary | ICD-10-CM

## 2021-12-28 DIAGNOSIS — M25.561 CHRONIC PAIN OF BOTH KNEES: Primary | ICD-10-CM

## 2021-12-28 DIAGNOSIS — M70.50 PES ANSERINE BURSITIS: ICD-10-CM

## 2021-12-28 DIAGNOSIS — G89.29 CHRONIC PAIN OF BOTH KNEES: Primary | ICD-10-CM

## 2021-12-28 DIAGNOSIS — Z74.09 IMPAIRED MOBILITY: ICD-10-CM

## 2021-12-28 PROCEDURE — 97112 NEUROMUSCULAR REEDUCATION: CPT

## 2021-12-28 PROCEDURE — 97110 THERAPEUTIC EXERCISES: CPT

## 2021-12-28 PROCEDURE — 97535 SELF CARE MNGMENT TRAINING: CPT

## 2021-12-28 NOTE — PROGRESS NOTES
Physical Therapy Treatment Note and Discharge Note    Patient: Holli Iyer                                                                     Visit Date: 2021  :     1945    Referring practitioner:    No ref. provider found  Date of Initial Visit:          Type: THERAPY  Noted: 2021    Patient seen for 27 sessions    Visit Diagnoses:    ICD-10-CM ICD-9-CM   1. Chronic pain of both knees  M25.561 719.46    M25.562 338.29    G89.29    2. Pes anserine bursitis  M70.50 726.61   3. Impaired mobility  Z74.09 799.89     SUBJECTIVE     Subjective Following the DN with tens, she was sore for multiple days and noticed increased L knee pain. She is overall feeling good and happy with her progression through physical therpay.     PAIN: 0/10 > 0/10     OBJECTIVE     Objective      Neuromuscular Reeducation     96471 Comments   Tandem stance on BL TheraDisc (EO and EC)  20-30 sec   M/L wobble board static stand    M/L wobble board mini squats     M/L wobble board with horizontal and vertical head turns     Timed Minutes 20     Therapeutic Exercises    07242 Comments   B hip MMT  See goals section    Standing hip flex/abd/ext with TRX straps  2 sets x 5 each direction                        Timed Minutes 10     Therapy Education/Self Care 64339   Details: Discussed how to effectively and safely progress balance at home (changing surface, adding head turns, adding arm movements).Also reviewed and progressed HEP, provided written handout.    Given [employer] HEP (access code [email or web address])  symptom relief  mobility training   Progress: New   Education provided to:  Patient   Level of understanding Verbalized   Timed Minutes 10     Access Code: [email or web address]  URL: [email or web address]  Date: 2021  Prepared by: Shaina Martinez    Exercises  Clam with Resistance - 1-2 x daily - 7 x weekly - 2 sets - 10 reps  Prone Hip Extension - 1-2 x daily - 7 x  weekly - 2 sets - 10 reps  Prone Quad Stretch with Strap - 1-2 x daily - 7 x weekly - 1 sets - 3 reps - 15-20 hold  Resisted Dorsiflexion Stretch with TheraBand - 1-2 x daily - 7 x weekly - 3 sets - 30 sec ea hold  Gastroc Stretch on Wall - 1-2 x daily - 7 x weekly - 3 sets - 30 sec ea hold  Standing March with Counter Support - 1-2 x daily - 7 x weekly - 2 sets - 10 reps  Sidelying Hip Extension in Abduction - 1-2 x daily - 7 x weekly - 2 sets - 10 reps  Standing Single Leg Stance with Counter Support - 1-2 x daily - 7 x weekly - 3 sets - 20 sec hold  Standing Hip Abduction with Counter Support - 1-2 x daily - 7 x weekly - 2 sets - 10 reps  Standing Hip Extension with Counter Support - 1-2 x daily - 7 x weekly - 2 sets - 10 reps    Total Timed Treatment:     40   mins  Total Time of Visit:             42   mins         ASSESSMENT/PLAN     GOALS  Goals                                             Progress Note due by 1/28/22                                                                Recert due by 2/26/22   STG by: 3 weeks  Comments Date Status   Patient will demonstrate >25 deg B hip passive IR  R 25 degrees Post mobs  L 26 post mobs  10/21/21 Met   Patient will demonstrate WFL B patellar mobility  Much improved this date 12/28/21 MET   Patient will maintain NBOS with EC for 15 seconds independently >30 sec with min sway 12/22/21 Met   Patient will demonstrate ability to maintain tandem standing for >20 seconds with no swaying noted    30 sec B LE -- 9/27/21 12/22/21: challenged tandem in balance with static and dynamic surfaces, demonstrated very min sway only w/ EC 12/22/21 Met    LTG by: 6 weeks          Patient will demonstrate independence with comprehensive HEP Bolstered today to include SLS so she can continue independent progression at home.  12/22/21 Met    Patient will demonstrate 5/5 B hip strength  Abd: L: 5/5 and R: 55  Glut max: L: 4+/5 and R 4/+5  Extension: L 4+/5 and R 4+/5 12/28/21 Partially  met    Patient will demonstrate 5/5 B knee strength   B knee strength for flex and ext: 5/5 21 Met    Patient will report worst pain being <3/10 on average for 1 week No pain reported this session or last for entire duration. Reported pain at worse 1/10 on 10/11/21. Pt report functional mobility also improving.  10/14/21 Met    Patient will score >55 on LEFS  43 on 2021 (eval)   57 on 2021 Met     Assessment/Plan     ASSESSMENT: Ms. Iyer has met or partially met all of her goals at this point, demonstrating good progression through physical therapy. She no longer has B knee pain, she is able to walk safely on uneven ground and her B hip strength has improved. She still has some dynamic balance deficits, however will HEP compliance she should continue to improve at home. Reviewed HEP today and had thorough discussion regarding appropriate ways to progress at home. It would be appropriate to discharge her at this time, if other needs arise she will return to PCP for new referral.     PLAN: Discharged POC    Physical Therapy Discharge Summary    Patient: Holli Iyer                                                                                     Today's Date: 2021  :     1945    Date of Initial Visit:          Type: THERAPY  Noted: 2021    Patient seen for 27 sessions    Visit Diagnoses:    ICD-10-CM ICD-9-CM   1. Chronic pain of both knees  M25.561 719.46    M25.562 338.29    G89.29    2. Pes anserine bursitis  M70.50 726.61   3. Impaired mobility  Z74.09 799.89       DISCHARGE SUMMARY   Discharge date 2021   Dates of this episode 21 through 21   Number of visits on this episode 27   Reason for discharge all goals met   Outcomes achieved Refer to the goals table for specifics on goals   Discharge plan Continue with current home exercise program as instructed   Discharge instruction Return to PCP should other needs arise to request new referral. Continue  with HEP       SIGNATURE: Shaina Martinez, PT DPT, License #: [id number]    Electronically Signed on 12/28/2021        [address] [place] [address]  [phone]

## 2022-01-10 RX ORDER — CITALOPRAM 10 MG/1
TABLET ORAL
Qty: 90 TABLET | Refills: 0 | Status: SHIPPED | OUTPATIENT
Start: 2022-01-10

## 2022-02-14 ENCOUNTER — LAB (OUTPATIENT)
Dept: INTERNAL MEDICINE | Facility: CLINIC | Age: 77
End: 2022-02-14

## 2022-02-14 DIAGNOSIS — E55.9 VITAMIN D DEFICIENCY: ICD-10-CM

## 2022-02-14 DIAGNOSIS — I10 BENIGN HYPERTENSION: Primary | ICD-10-CM

## 2022-02-14 DIAGNOSIS — Z79.899 ENCOUNTER FOR LONG-TERM CURRENT USE OF MEDICATION: ICD-10-CM

## 2022-02-15 LAB
25(OH)D3+25(OH)D2 SERPL-MCNC: 48.7 NG/ML (ref 30–100)
ALBUMIN SERPL-MCNC: 4 G/DL (ref 3.7–4.7)
ALBUMIN/GLOB SERPL: 1.5 {RATIO} (ref 1.2–2.2)
ALP SERPL-CCNC: 104 IU/L (ref 44–121)
ALT SERPL-CCNC: 18 IU/L (ref 0–32)
APPEARANCE UR: CLEAR
AST SERPL-CCNC: 16 IU/L (ref 0–40)
BACTERIA #/AREA URNS HPF: NORMAL /[HPF]
BASOPHILS # BLD AUTO: 0 X10E3/UL (ref 0–0.2)
BASOPHILS NFR BLD AUTO: 0 %
BILIRUB SERPL-MCNC: 0.6 MG/DL (ref 0–1.2)
BILIRUB UR QL STRIP: NEGATIVE
BUN SERPL-MCNC: 16 MG/DL (ref 8–27)
BUN/CREAT SERPL: 24 (ref 12–28)
CALCIUM SERPL-MCNC: 9.5 MG/DL (ref 8.7–10.3)
CASTS URNS QL MICRO: NORMAL /LPF
CHLORIDE SERPL-SCNC: 105 MMOL/L (ref 96–106)
CHOLEST SERPL-MCNC: 193 MG/DL (ref 100–199)
CO2 SERPL-SCNC: 24 MMOL/L (ref 20–29)
COLOR UR: YELLOW
CREAT SERPL-MCNC: 0.68 MG/DL (ref 0.57–1)
EOSINOPHIL # BLD AUTO: 0.2 X10E3/UL (ref 0–0.4)
EOSINOPHIL NFR BLD AUTO: 3 %
EPI CELLS #/AREA URNS HPF: NORMAL /HPF (ref 0–10)
ERYTHROCYTE [DISTWIDTH] IN BLOOD BY AUTOMATED COUNT: 12.7 % (ref 11.7–15.4)
GLOBULIN SER CALC-MCNC: 2.6 G/DL (ref 1.5–4.5)
GLUCOSE SERPL-MCNC: 99 MG/DL (ref 65–99)
GLUCOSE UR QL STRIP: NEGATIVE
HCT VFR BLD AUTO: 41.9 % (ref 34–46.6)
HDLC SERPL-MCNC: 47 MG/DL
HGB BLD-MCNC: 13.3 G/DL (ref 11.1–15.9)
HGB UR QL STRIP: NEGATIVE
IMM GRANULOCYTES # BLD AUTO: 0 X10E3/UL (ref 0–0.1)
IMM GRANULOCYTES NFR BLD AUTO: 0 %
KETONES UR QL STRIP: NEGATIVE
LDLC SERPL CALC-MCNC: 119 MG/DL (ref 0–99)
LEUKOCYTE ESTERASE UR QL STRIP: NEGATIVE
LYMPHOCYTES # BLD AUTO: 1.3 X10E3/UL (ref 0.7–3.1)
LYMPHOCYTES NFR BLD AUTO: 21 %
MCH RBC QN AUTO: 27.6 PG (ref 26.6–33)
MCHC RBC AUTO-ENTMCNC: 31.7 G/DL (ref 31.5–35.7)
MCV RBC AUTO: 87 FL (ref 79–97)
MICRO URNS: NORMAL
MICRO URNS: NORMAL
MONOCYTES # BLD AUTO: 0.6 X10E3/UL (ref 0.1–0.9)
MONOCYTES NFR BLD AUTO: 9 %
NEUTROPHILS # BLD AUTO: 4.2 X10E3/UL (ref 1.4–7)
NEUTROPHILS NFR BLD AUTO: 67 %
NITRITE UR QL STRIP: NEGATIVE
PH UR STRIP: 6.5 [PH] (ref 5–7.5)
PLATELET # BLD AUTO: 242 X10E3/UL (ref 150–450)
POTASSIUM SERPL-SCNC: 4.2 MMOL/L (ref 3.5–5.2)
PROT SERPL-MCNC: 6.6 G/DL (ref 6–8.5)
PROT UR QL STRIP: NEGATIVE
RBC # BLD AUTO: 4.82 X10E6/UL (ref 3.77–5.28)
RBC #/AREA URNS HPF: NORMAL /HPF (ref 0–2)
SODIUM SERPL-SCNC: 142 MMOL/L (ref 134–144)
SP GR UR STRIP: 1.01 (ref 1–1.03)
TRIGL SERPL-MCNC: 151 MG/DL (ref 0–149)
TSH SERPL DL<=0.005 MIU/L-ACNC: 3.05 UIU/ML (ref 0.45–4.5)
URATE SERPL-MCNC: 5 MG/DL (ref 3.1–7.9)
UROBILINOGEN UR STRIP-MCNC: 0.2 MG/DL (ref 0.2–1)
VLDLC SERPL CALC-MCNC: 27 MG/DL (ref 5–40)
WBC # BLD AUTO: 6.4 X10E3/UL (ref 3.4–10.8)
WBC #/AREA URNS HPF: NORMAL /HPF (ref 0–5)

## 2022-02-15 RX ORDER — ERGOCALCIFEROL 1.25 MG/1
50000 CAPSULE ORAL
Qty: 24 CAPSULE | Refills: 3 | Status: SHIPPED | OUTPATIENT
Start: 2022-02-15

## 2022-02-20 ENCOUNTER — HOSPITAL ENCOUNTER (EMERGENCY)
Facility: HOSPITAL | Age: 77
Discharge: STILL ADMITTED OR ADMINISTRATIVE | End: 2022-02-20

## 2022-02-21 ENCOUNTER — OFFICE VISIT (OUTPATIENT)
Dept: INTERNAL MEDICINE | Facility: CLINIC | Age: 77
End: 2022-02-21

## 2022-02-21 VITALS
OXYGEN SATURATION: 97 % | HEIGHT: 68 IN | BODY MASS INDEX: 31.22 KG/M2 | TEMPERATURE: 96.8 F | DIASTOLIC BLOOD PRESSURE: 62 MMHG | HEART RATE: 68 BPM | WEIGHT: 206 LBS | SYSTOLIC BLOOD PRESSURE: 108 MMHG

## 2022-02-21 DIAGNOSIS — Z78.0 POST-MENOPAUSAL: ICD-10-CM

## 2022-02-21 DIAGNOSIS — Z12.31 SCREENING MAMMOGRAM, ENCOUNTER FOR: ICD-10-CM

## 2022-02-21 DIAGNOSIS — Z12.31 SCREENING MAMMOGRAM, ENCOUNTER FOR: Primary | ICD-10-CM

## 2022-02-21 DIAGNOSIS — K63.5 POLYP OF COLON, UNSPECIFIED PART OF COLON, UNSPECIFIED TYPE: ICD-10-CM

## 2022-02-21 PROCEDURE — 1160F RVW MEDS BY RX/DR IN RCRD: CPT | Performed by: FAMILY MEDICINE

## 2022-02-21 PROCEDURE — G0439 PPPS, SUBSEQ VISIT: HCPCS | Performed by: FAMILY MEDICINE

## 2022-02-21 PROCEDURE — 1170F FXNL STATUS ASSESSED: CPT | Performed by: FAMILY MEDICINE

## 2022-02-21 NOTE — PROGRESS NOTES
The ABCs of the Annual Wellness Visit  Subsequent Medicare Wellness Visit    Chief Complaint   Patient presents with   • Medicare Wellness-subsequent   • Shortness of Breath     pt states she feels more out of breath over the last couple of months with exercution   • Knee Pain     bilateral knee pain; on going issue       Subjective    History of Present Illness:  Holli Iyer is a 76 y.o. female who presents for a Subsequent Medicare Wellness Visit.    Weight   She reports a weigh gain of 10 pounds since November. She makes note of decrease activity     Knee   She reports bursitis onto bilateral knees. She follows with orthopedic Dr. Woodard. She states Dr. Sanchez prescribed physical therapy, however reports no relief with this. She believes this is due to her weight gain. She has received injections, reports relief with this. While at physical therapy, she also received balance therapy, and is requesting more sessions. Of note, while walking she feels off balance. She states she is aware she does not lift her foot up as much and catches most of her falls.    The patient drinks alcohol occasionally, and exercises when she can.     Asthma  The patient believes she has developed asthma within this past year. She was given Flonase Dr. Sanchez. She wears a CPAP machine at night, and believes this has helped her. She states when she noticed her symptoms she hear a whistle on exhale, no wheezing. Of note the patient works with [employer]. She has not had a PFT, her last chest x-ray was in February.    Wellness  She reports her mammograms are up to date. She does regular breast exams. She is due for a pap smear and a colonoscopy. Last colposcopy revealed a polyp, and was advised for follow up colonoscopy within 5 years. She is not sexually active.    Foot  She reports swelling of the left foot after prolonged standing. She states she is trying to take more frequent breaks. She also reports soreness and tenderness onto the  soles of her foot. She does stretch regularly.     The following portions of the patient's history were reviewed and   updated as appropriate: allergies, current medications, past family history, past medical history, past social history, past surgical history and problem list.    Compared to one year ago, the patient feels her physical   health is the same.    Compared to one year ago, the patient feels her mental   health is the same.    Recent Hospitalizations:  She was not admitted to the hospital during the last year.       Current Medical Providers:  Patient Care Team:  Obdulia Mijares DO as PCP - Internal Medicine (Family Medicine)  Eulogio Zamarripa Jr., MD as Consulting Physician (Otolaryngology)  Ebenezer Luis PA as Physician Assistant (Otolaryngology)    Outpatient Medications Prior to Visit   Medication Sig Dispense Refill   • aspirin 81 MG EC tablet Take 81 mg by mouth Daily.     • atenolol (TENORMIN) 25 MG tablet Take 1 tablet by mouth Daily. (Patient taking differently: Take 50 mg by mouth Daily.) 10 tablet 0   • citalopram (CeleXA) 10 MG tablet TAKE 1 TABLET EVERY MORNING 90 tablet 0   • Lifitegrast (XIIDRA) 5 % ophthalmic solution Administer 1 drop to both eyes 2 (Two) Times a Day.     • vitamin D (ERGOCALCIFEROL) 1.25 MG (41917 UT) capsule capsule Take 1 capsule by mouth Every 7 (Seven) Days. 24 capsule 3   • mometasone (Nasonex) 50 MCG/ACT nasal spray 2 sprays into the nostril(s) as directed by provider Daily. 17 g 0     No facility-administered medications prior to visit.       No opioid medication identified on active medication list. I have reviewed chart for other potential  high risk medication/s and harmful drug interactions in the elderly.          Aspirin is on active medication list. Aspirin use is indicated based on review of current medical condition/s. Pros and cons of this therapy have been discussed today. Benefits of this medication outweigh potential harm.   "Patient has been encouraged to continue taking this medication.  .      Patient Active Problem List   Diagnosis   • Gastroesophageal reflux disease   • Heartburn   • Esophageal spasm   • Benign hypertension   • GILL (obstructive sleep apnea)   • Vitamin D deficiency   • Arthritis of knee   • Benign neoplasm of ovary   • Breast cyst, right   • Closed fracture of lumbar vertebra without spinal cord injury (HCC)   • Degeneration of cervicothoracic intervertebral disc   • Glaucoma   • Gluten intolerance   • Insomnia   • Menopausal depression   • Midline cystocele   • Mitral valve prolapse   • Cervicalgia   • Postmenopausal status   • Proteinuria   • Rectocele   • Uterine prolapse   • Benign paroxysmal positional vertigo     Advance Care Planning  Advance Directive is not on file.  ACP discussion was held with the patient during this visit. Patient does not have an advance directive, information provided.          Objective    Vitals:    02/21/22 0927   BP: 108/62   BP Location: Left arm   Patient Position: Sitting   Cuff Size: Adult   Pulse: 68   Temp: 96.8 °F (36 °C)   TempSrc: Temporal   SpO2: 97%   Weight: 93.4 kg (206 lb)   Height: 172.7 cm (68\")   PainSc: 0-No pain   PainLoc: Knee     BMI Readings from Last 1 Encounters:   02/21/22 31.32 kg/m²   BMI is above normal parameters. Recommendations include: exercise counseling and nutrition counseling    Does the patient have evidence of cognitive impairment? No    Physical Exam  Vitals and nursing note reviewed.   Constitutional:       Appearance: Normal appearance. She is well-developed.   HENT:      Head: Normocephalic and atraumatic.      Right Ear: External ear normal.      Left Ear: External ear normal.      Nose: Nose normal.      Mouth/Throat:      Mouth: Mucous membranes are moist.   Eyes:      Extraocular Movements: Extraocular movements intact.      Conjunctiva/sclera: Conjunctivae normal.      Pupils: Pupils are equal, round, and reactive to light.   Neck:     "  Thyroid: No thyromegaly.      Vascular: No JVD.      Trachea: No tracheal deviation.   Cardiovascular:      Rate and Rhythm: Normal rate and regular rhythm.      Pulses: Normal pulses.      Heart sounds: Normal heart sounds. No murmur heard.  No friction rub. No gallop.    Pulmonary:      Effort: Pulmonary effort is normal.      Breath sounds: Normal breath sounds.   Abdominal:      General: Bowel sounds are normal. There is no distension.      Palpations: Abdomen is soft.      Tenderness: There is no abdominal tenderness.   Musculoskeletal:         General: Normal range of motion.      Cervical back: Normal range of motion and neck supple.      Comments: Heberden's and Beverly's nodes on multiple fingers present   Lymphadenopathy:      Cervical: No cervical adenopathy.   Skin:     General: Skin is warm and dry.      Capillary Refill: Capillary refill takes less than 2 seconds.   Neurological:      Mental Status: She is alert and oriented to person, place, and time.      Cranial Nerves: No cranial nerve deficit.      Coordination: Coordination normal.   Psychiatric:         Mood and Affect: Mood normal.         Behavior: Behavior normal.       Lab Results   Component Value Date    CHLPL 193 02/14/2022    TRIG 151 (H) 02/14/2022    HDL 47 02/14/2022     (H) 02/14/2022    VLDL 27 02/14/2022            HEALTH RISK ASSESSMENT    Smoking Status:  Social History     Tobacco Use   Smoking Status Never Smoker   Smokeless Tobacco Never Used     Alcohol Consumption:  Social History     Substance and Sexual Activity   Alcohol Use Yes    Comment: hollis kilgore      Fall Risk Screen:    [facility], and patient is at LOW risk for falls.Assessment completed on:2/21/2022    Depression Screening:  PHQ-2/PHQ-9 Depression Screening 2/21/2022   Little interest or pleasure in doing things 0   Feeling down, depressed, or hopeless 0   Total Score 0       Health Habits and Functional and  Cognitive Screening:  Functional & Cognitive Status 2/21/2022   Do you have difficulty preparing food and eating? No   Do you have difficulty bathing yourself, getting dressed or grooming yourself? No   Do you have difficulty using the toilet? No   Do you have difficulty moving around from place to place? No   Do you have trouble with steps or getting out of a bed or a chair? No   Current Diet Well Balanced Diet   Dental Exam Up to date   Eye Exam Up to date   Exercise (times per week) 0 times per week   Current Exercises Include No Regular Exercise   Current Exercise Activities Include -   Do you need help using the phone?  No   Are you deaf or do you have serious difficulty hearing?  No   Do you need help with transportation? No   Do you need help shopping? No   Do you need help preparing meals?  No   Do you need help with housework?  No   Do you need help with laundry? No   Do you need help taking your medications? No   Do you need help managing money? No   Do you ever drive or ride in a car without wearing a seat belt? No   Have you felt unusual stress, anger or loneliness in the last month? No   Who do you live with? Other   If you need help, do you have trouble finding someone available to you? No   Have you been bothered in the last four weeks by sexual problems? No   Do you have difficulty concentrating, remembering or making decisions? No       Age-appropriate Screening Schedule:  Refer to the list below for future screening recommendations based on patient's age, sex and/or medical conditions. Orders for these recommended tests are listed in the plan section. The patient has been provided with a written plan.    Health Maintenance   Topic Date Due   • DXA SCAN  Never done   • MAMMOGRAM  04/01/2022   • TDAP/TD VACCINES (4 - Td or Tdap) 06/28/2031   • INFLUENZA VACCINE  Completed   • ZOSTER VACCINE  Completed              Assessment/Plan   CMS Preventative Services Quick Reference  Risk Factors Identified  During Encounter  Depression/Dysphoria  Obesity/Overweight   The above risks/problems have been discussed with the patient.  Follow up actions/plans if indicated are seen below in the Assessment/Plan Section.  Pertinent information has been shared with the patient in the After Visit Summary.    Diagnoses and all orders for this visit:      1.  Subsequent medicare wellness    2. Screening mammogram, encounter for (Primary)  -     Mammo Screening Digital Tomosynthesis Bilateral With CAD; Future    3. Post-menopausal  -     DEXA Bone Density Axial; Future    4. Polyp of colon, unspecified part of colon, unspecified type  -     Ambulatory Referral to Gastroenterology    5. Weight     • Discussed with the patient portion control, and snack healthy  • Discussed to increase exercise    Follow Up:   Six month, sooner if needed      An After Visit Summary and PPPS were made available to the patient.    {Optional Chart Navigation Links Wrapup  Review (Popup)  Advance Care Planning  Labs  CC  Problem List  Visit Diagnosis  Medications  Result Review  Imaging  Health Maintenance  Quality  BestPractice  SmartSets  SnapShot  Encounters  Notes  Media  Procedures :23}          Transcribed from ambient dictation for Obdulia Mijares DO by Dominique Neal.  02/21/22   15:15 CST    Patient verbalized consent to the visit recording.

## 2022-02-22 ENCOUNTER — TELEPHONE (OUTPATIENT)
Dept: INTERNAL MEDICINE | Age: 77
End: 2022-02-22

## 2022-02-22 DIAGNOSIS — I10 PRIMARY HYPERTENSION: Primary | ICD-10-CM

## 2022-02-22 NOTE — TELEPHONE ENCOUNTER
----- Message from Martachung Jihan sent at 2022 11:09 AM CST -----  Subject: Appointment Request    Reason for Call: New Patient Request Appointment    QUESTIONS  Type of Appointment? Established Patient  Reason for appointment request? No appointments available during search  Additional Information for Provider? Pt would like to schedule with Dr. Bethany Morales. The pt stated that Dr. Antonia Lemusse children and she is friends   of the family. Please call pt at [phone].  ---------------------------------------------------------------------------  --------------  Jael RAMOS  What is the best way for the office to contact you? OK to leave message on   voicemail  Preferred Call Back Phone Number? [phone]  ---------------------------------------------------------------------------  --------------  SCRIPT ANSWERS  Relationship to Patient? Self  Specialty Confirmation? Primary Care  Is this the first appointment to establish care for a ? No  Have you been diagnosed with, awaiting test results for, or told that you   are suspected of having COVID-19 (Coronavirus)? (If patient has tested   negative or was tested as a requirement for work, school, or travel and   not based on symptoms, answer no)? No  Within the past 10 days have you developed any of the following symptoms   (answer no if symptoms have been present longer than 10 days or began   more than 10 days ago)? Fever or Chills, Cough, Shortness of breath or   difficulty breathing, Loss of taste or smell, Sore throat, Nasal   congestion, Sneezing or runny nose, Fatigue or generalized body aches   (answer no if pain is specific to a body part e.g. back pain), Diarrhea,   Headache? No  Have you had close contact with someone with COVID-19 in the last 7 days? No  (Service Expert  click yes below to proceed with [employer] As Usual   Scheduling)?  Yes

## 2022-02-22 NOTE — TELEPHONE ENCOUNTER
I can see her - I know her - if doing ok could see her march April or so or sooner if opeining and get cbc,cmp, tsh, lipid before apt ---if having any problems I can see her sooner

## 2022-02-28 ENCOUNTER — TELEPHONE (OUTPATIENT)
Dept: INTERNAL MEDICINE | Age: 77
End: 2022-02-28

## 2022-02-28 NOTE — TELEPHONE ENCOUNTER
2/22/2022  Dr Merlinda Novas [facility] practice just did labs on 2/22/22 and she would like to know if we can use those for her upcoming appointment. She does not believe her insurance would cover a new set after just having hers done. I advised I would get in contact with  's nurses ( the current provider in that office) and see if we can't get labs faxed over to us. I did advise that she may need to sign a records release with them for us to receive them. Faxing request, will update notes based on what we can get.

## 2022-03-09 RX ORDER — ATENOLOL 25 MG/1
50 TABLET ORAL DAILY
Qty: 90 TABLET | Refills: 3 | Status: SHIPPED | OUTPATIENT
Start: 2022-03-09

## 2022-03-10 ENCOUNTER — HOSPITAL ENCOUNTER (OUTPATIENT)
Dept: WOMENS IMAGING | Age: 77
Discharge: HOME | End: 2022-03-10
Payer: MEDICARE

## 2022-03-10 DIAGNOSIS — Z78.0 POST-MENOPAUSAL: Primary | ICD-10-CM

## 2022-03-10 DIAGNOSIS — Z78.0 POST-MENOPAUSAL: ICD-10-CM

## 2022-03-10 DIAGNOSIS — Z12.31 BREAST CANCER SCREENING BY MAMMOGRAM: ICD-10-CM

## 2022-03-10 PROBLEM — K22.4 ESOPHAGEAL SPASM: Status: ACTIVE | Noted: 2017-08-31

## 2022-03-10 PROBLEM — G47.33 OSA (OBSTRUCTIVE SLEEP APNEA): Status: ACTIVE | Noted: 2020-02-04

## 2022-03-10 PROBLEM — E55.9 VITAMIN D DEFICIENCY: Status: ACTIVE | Noted: 2020-02-04

## 2022-03-10 PROBLEM — I10 BENIGN HYPERTENSION: Status: ACTIVE | Noted: 2020-02-04

## 2022-03-10 PROBLEM — K21.9 GASTROESOPHAGEAL REFLUX DISEASE WITHOUT ESOPHAGITIS: Status: ACTIVE | Noted: 2017-08-31

## 2022-03-10 PROCEDURE — 77080 DXA BONE DENSITY AXIAL: CPT

## 2022-03-10 PROCEDURE — 77063 BREAST TOMOSYNTHESIS BI: CPT

## 2022-03-11 ENCOUNTER — HOSPITAL ENCOUNTER (OUTPATIENT)
Dept: WOMENS IMAGING | Age: 77
Discharge: HOME | End: 2022-03-11
Payer: MEDICARE

## 2022-03-11 DIAGNOSIS — R92.8 ABNORMAL MAMMOGRAM: ICD-10-CM

## 2022-03-11 PROCEDURE — 76642 ULTRASOUND BREAST LIMITED: CPT

## 2022-03-15 ENCOUNTER — OFFICE VISIT (OUTPATIENT)
Dept: GASTROENTEROLOGY | Facility: CLINIC | Age: 77
End: 2022-03-15

## 2022-03-15 VITALS
TEMPERATURE: 97.4 F | DIASTOLIC BLOOD PRESSURE: 70 MMHG | HEART RATE: 55 BPM | OXYGEN SATURATION: 97 % | BODY MASS INDEX: 32.33 KG/M2 | WEIGHT: 206 LBS | HEIGHT: 67 IN | SYSTOLIC BLOOD PRESSURE: 110 MMHG

## 2022-03-15 DIAGNOSIS — Z86.010 HX OF COLONIC POLYPS: Primary | ICD-10-CM

## 2022-03-15 PROBLEM — Z86.0100 HX OF COLONIC POLYPS: Status: ACTIVE | Noted: 2022-03-15

## 2022-03-15 PROCEDURE — S0260 H&P FOR SURGERY: HCPCS | Performed by: NURSE PRACTITIONER

## 2022-03-15 RX ORDER — SODIUM, POTASSIUM,MAG SULFATES 17.5-3.13G
1 SOLUTION, RECONSTITUTED, ORAL ORAL EVERY 12 HOURS
Qty: 177 ML | Refills: 0 | Status: SHIPPED | OUTPATIENT
Start: 2022-03-15 | End: 2022-04-11 | Stop reason: HOSPADM

## 2022-03-15 NOTE — PROGRESS NOTES
Chief Complaint   Patient presents with   • Colon Cancer Screening     Pt presents today for evaluation for colonoscopy-pt's last colon was 2/12/2015; Personal history of hyperplastic polyps     Subjective   HPI  Holli Iyer is a 76 y.o. female who presents as a referral for preventative maintenance. She has no complaints of nausea or vomiting. No change in bowels. No wt loss. No BRBPR. No melena. There is no family hx for colon cancer. No abdominal pain. There was no Cologuard screening this year.  The patient's last colonoscopy revealed diverticulosis in the sigmoid colon and hyperplastic polyp in the sigmoid colon formed on 2/12/2015.  Past Medical History:   Diagnosis Date   • Diverticulosis    • GERD (gastroesophageal reflux disease)    • History of colon polyps    • Hypertension    • Hyperthyroidism    • Insomnia    • Irregular heart beat    • MVP (mitral valve prolapse)    • Sleep apnea      Past Surgical History:   Procedure Laterality Date   • BREAST BIOPSY Left    • COLONOSCOPY  02/12/2015    Diverticulosis in the sigmoid colon; One 5mm hyperplastic polyp in the sigmoid colon; The examination was otherwise normal on direct and retroflexion views; Repeat 5 years   • ENDOSCOPY N/A 10/26/2017    Normal esophagus; A single gastric polyp-biopsied; Normal stomach; Normal examined duodenum       Current Outpatient Medications:   •  aspirin 81 MG EC tablet, Take 81 mg by mouth Daily., Disp: , Rfl:   •  atenolol (TENORMIN) 25 MG tablet, Take 2 tablets by mouth Daily. (Patient taking differently: Take 25 mg by mouth Daily.), Disp: 90 tablet, Rfl: 3  •  citalopram (CeleXA) 10 MG tablet, TAKE 1 TABLET EVERY MORNING (Patient taking differently: Take 10 mg by mouth Daily.), Disp: 90 tablet, Rfl: 0  •  Lifitegrast (XIIDRA) 5 % ophthalmic solution, Administer 1 drop to both eyes 2 (Two) Times a Day., Disp: , Rfl:   •  vitamin D (ERGOCALCIFEROL) 1.25 MG (32951 UT) capsule capsule, Take 1 capsule by mouth Every 7 (Seven)  "Days., Disp: 24 capsule, Rfl: 3  •  sodium-potassium-magnesium sulfates (Suprep Bowel Prep Kit) 17.5-3.13-1.6 GM/177ML solution oral solution, Take 1 bottle by mouth Every 12 (Twelve) Hours. Split dose prep as directed by office instructions provided.  2 bottles = one kit., Disp: 177 mL, Rfl: 0  Allergies   Allergen Reactions   • Declomycin [Demeclocycline] Palpitations   • Penicillins Unknown (See Comments)     As a child   • Sulfa Antibiotics Itching     Social History     Socioeconomic History   • Marital status:    Tobacco Use   • Smoking status: Never Smoker   • Smokeless tobacco: Never Used   Vaping Use   • Vaping Use: Never used   Substance and Sexual Activity   • Alcohol use: Yes     Comment: Rarely-glass of wine   • Drug use: No   • Sexual activity: Not Currently     Family History   Problem Relation Age of Onset   • Heart failure Mother 100   • Ulcerative colitis Mother    • Cancer Father    • Diabetes Brother    • Colon cancer Neg Hx    • Esophageal cancer Neg Hx    • Colon polyps Neg Hx    • Liver cancer Neg Hx    • Rectal cancer Neg Hx    • Stomach cancer Neg Hx    • Liver disease Neg Hx        REVIEW OF SYSTEMS  General: well appearing, no fever chills or sweats, no unexplained wt loss  HEENT: no acute visual or hearing disturbances  Cardiovascular: No chest pain or palpitations  Pulmonary: No shortness of breath, coughing, wheezing or hemoptysis  : No burning, urgency, hematuria, or dysuria  Musculoskeletal: No joint pain or stiffness  Peripheral: no edema  Skin: No lesions or rashes  Neuro: No dizziness, headaches, stroke, syncope  Endocrine: No hot or cold intolerances  Hematological: No blood dyscrasias    Objective   Vitals:    03/15/22 0943   BP: 110/70   BP Location: Left arm   Patient Position: Sitting   Cuff Size: Adult   Pulse: 55   Temp: 97.4 °F (36.3 °C)   TempSrc: Infrared   SpO2: 97%   Weight: 93.4 kg (206 lb)   Height: 170.2 cm (67\")         PHYSICAL EXAM  General: age " appropriate well nourished well appearing, no acute distress  Head: normocephalic and atraumatic  Global assessment-supple  Neck-No JVD noted, no lymphadenopathy  Pulmonary-clear to auscultation bilaterally, normal respiratory effort  Cardiovascular-normal rate and rhythm, normal heart sounds, S1 and S2 noted  Abdomen-soft, non tender, non distended, normal bowel sounds all 4 quadrants, no hepatosplenomegaly noted  Extremities-No clubbing cyanosis or edema  Neuro-Non focal, converses appropriately, awake, alert, oriented    Lab Results - Last 18 Months   Lab Units 02/14/22  0901 02/04/21  0955   GLUCOSE mg/dL 99 97   BUN mg/dL 16 23   CREATININE mg/dL 0.68 0.68   SODIUM mmol/L 142 140   POTASSIUM mmol/L 4.2 4.2   CHLORIDE mmol/L 105 106   TOTAL CO2 mmol/L 24 24.0   ALBUMIN g/dL 4.0 4.10   ALT (SGPT) IU/L 18 17   AST (SGOT) IU/L 16 19   ALK PHOS IU/L 104 102   BILIRUBIN mg/dL 0.6 0.5       Lab Results - Last 18 Months   Lab Units 02/14/22  0901 02/04/21  0955   HEMOGLOBIN g/dL 13.3 13.5   HEMATOCRIT % 41.9 41.0   MCV fL 87 86.1   WBC x10E3/uL 6.4 5.95   RDW % 12.7 12.9   PLATELETS x10E3/uL 242 219       Lab Results - Last 18 Months   Lab Units 02/14/22  0901 02/04/21  0955   TSH uIU/mL 3.050 1.540   VIT D 25 HYDROXY ng/mL 48.7 49.2            Imaging Results (Most Recent)     None        Assessment/Plan   Diagnoses and all orders for this visit:    1. Hx of colonic polyps (Primary)  -     Case Request; Standing  -     Case Request    Other orders  -     Follow Anesthesia Guidelines / Protocol; Future  -     Obtain Informed Consent; Future  -     sodium-potassium-magnesium sulfates (Suprep Bowel Prep Kit) 17.5-3.13-1.6 GM/177ML solution oral solution; Take 1 bottle by mouth Every 12 (Twelve) Hours. Split dose prep as directed by office instructions provided.  2 bottles = one kit.  Dispense: 177 mL; Refill: 0      COLONOSCOPY WITH ANESTHESIA (N/A)           All risks, benefits, alternatives, and indications of  colonoscopy procedure have been discussed with the patient. Risks to include perforation of the colon requiring possible surgery or colostomy, risk of bleeding from biopsies or removal of colon tissue, possibility of missing a colon polyp or cancer, or adverse drug reaction.  Benefits to include the diagnosis and management of disease of the colon and rectum. Alternatives to include barium enema, radiographic evaluation, lab testing or no intervention. Pt verbalizes understanding and agrees.

## 2022-03-29 PROBLEM — N60.01 BREAST CYST, RIGHT: Status: ACTIVE | Noted: 2020-05-18

## 2022-03-29 PROBLEM — M50.33 DEGENERATION OF CERVICOTHORACIC INTERVERTEBRAL DISC: Status: ACTIVE | Noted: 2020-05-18

## 2022-03-29 PROBLEM — N81.4 UTERINE PROLAPSE: Status: ACTIVE | Noted: 2020-05-18

## 2022-03-29 PROBLEM — D27.9 BENIGN NEOPLASM OF OVARY: Status: ACTIVE | Noted: 2020-05-18

## 2022-03-29 PROBLEM — N81.6 RECTOCELE: Status: ACTIVE | Noted: 2020-05-18

## 2022-03-29 PROBLEM — M17.10 ARTHRITIS OF KNEE: Status: ACTIVE | Noted: 2020-05-18

## 2022-03-29 PROBLEM — R12 HEARTBURN: Status: ACTIVE | Noted: 2017-08-31

## 2022-03-29 PROBLEM — G47.00 INSOMNIA: Status: ACTIVE | Noted: 2020-05-18

## 2022-03-29 PROBLEM — R80.9 PROTEINURIA: Status: ACTIVE | Noted: 2020-05-18

## 2022-03-29 PROBLEM — H81.10 BENIGN PAROXYSMAL POSITIONAL VERTIGO: Status: ACTIVE | Noted: 2020-08-05

## 2022-03-29 PROBLEM — I34.1 MITRAL VALVE PROLAPSE: Status: ACTIVE | Noted: 2020-05-18

## 2022-03-29 PROBLEM — N81.11 MIDLINE CYSTOCELE: Status: ACTIVE | Noted: 2020-05-18

## 2022-03-29 PROBLEM — H40.9 GLAUCOMA: Status: ACTIVE | Noted: 2020-05-18

## 2022-03-29 PROBLEM — K90.41 GLUTEN INTOLERANCE: Status: ACTIVE | Noted: 2020-05-18

## 2022-03-29 PROBLEM — F32.89 MENOPAUSAL DEPRESSION: Status: ACTIVE | Noted: 2020-05-18

## 2022-03-29 PROBLEM — S32.009A CLOSED FRACTURE OF LUMBAR VERTEBRA WITHOUT SPINAL CORD INJURY (HCC): Status: ACTIVE | Noted: 2020-05-18

## 2022-03-29 PROBLEM — M54.2 CERVICALGIA: Status: ACTIVE | Noted: 2020-05-18

## 2022-03-29 RX ORDER — ASPIRIN 81 MG/1
81 TABLET ORAL DAILY
COMMUNITY

## 2022-03-29 RX ORDER — CITALOPRAM 10 MG/1
1 TABLET ORAL DAILY
COMMUNITY
Start: 2022-01-10 | End: 2022-04-13 | Stop reason: SDUPTHER

## 2022-03-29 RX ORDER — LORAZEPAM 0.5 MG/1
0.5 TABLET ORAL NIGHTLY PRN
COMMUNITY
End: 2022-05-03 | Stop reason: ALTCHOICE

## 2022-03-29 RX ORDER — ATENOLOL 25 MG/1
1 TABLET ORAL DAILY
COMMUNITY
Start: 2022-03-09

## 2022-03-29 RX ORDER — LIFITEGRAST 50 MG/ML
SOLUTION/ DROPS OPHTHALMIC
COMMUNITY
Start: 2022-01-12

## 2022-03-29 RX ORDER — ERGOCALCIFEROL (VITAMIN D2) 1250 MCG
1 CAPSULE ORAL DAILY
COMMUNITY
Start: 2022-02-15 | End: 2022-03-29 | Stop reason: DRUGHIGH

## 2022-03-29 RX ORDER — ERGOCALCIFEROL (VITAMIN D2) 1250 MCG
50000 CAPSULE ORAL WEEKLY
COMMUNITY

## 2022-03-29 SDOH — ECONOMIC STABILITY: FOOD INSECURITY: WITHIN THE PAST 12 MONTHS, YOU WORRIED THAT YOUR FOOD WOULD RUN OUT BEFORE YOU GOT MONEY TO BUY MORE.: NEVER TRUE

## 2022-03-29 SDOH — ECONOMIC STABILITY: FOOD INSECURITY: WITHIN THE PAST 12 MONTHS, THE FOOD YOU BOUGHT JUST DIDN'T LAST AND YOU DIDN'T HAVE MONEY TO GET MORE.: NEVER TRUE

## 2022-03-29 ASSESSMENT — SOCIAL DETERMINANTS OF HEALTH (SDOH): HOW HARD IS IT FOR YOU TO PAY FOR THE VERY BASICS LIKE FOOD, HOUSING, MEDICAL CARE, AND HEATING?: NOT VERY HARD

## 2022-04-11 ENCOUNTER — HOSPITAL ENCOUNTER (OUTPATIENT)
Facility: HOSPITAL | Age: 77
Setting detail: HOSPITAL OUTPATIENT SURGERY
Discharge: HOME | End: 2022-04-11
Attending: INTERNAL MEDICINE | Admitting: INTERNAL MEDICINE

## 2022-04-11 ENCOUNTER — ANESTHESIA (OUTPATIENT)
Dept: GASTROENTEROLOGY | Facility: HOSPITAL | Age: 77
End: 2022-04-11

## 2022-04-11 ENCOUNTER — ANESTHESIA EVENT (OUTPATIENT)
Dept: GASTROENTEROLOGY | Facility: HOSPITAL | Age: 77
End: 2022-04-11

## 2022-04-11 VITALS
WEIGHT: 200 LBS | OXYGEN SATURATION: 92 % | RESPIRATION RATE: 16 BRPM | SYSTOLIC BLOOD PRESSURE: 110 MMHG | HEART RATE: 63 BPM | BODY MASS INDEX: 31.39 KG/M2 | DIASTOLIC BLOOD PRESSURE: 62 MMHG | HEIGHT: 67 IN | TEMPERATURE: 97.2 F

## 2022-04-11 DIAGNOSIS — Z86.010 HX OF COLONIC POLYPS: ICD-10-CM

## 2022-04-11 PROCEDURE — 88305 TISSUE EXAM BY PATHOLOGIST: CPT | Performed by: INTERNAL MEDICINE

## 2022-04-11 PROCEDURE — 45385 COLONOSCOPY W/LESION REMOVAL: CPT | Performed by: INTERNAL MEDICINE

## 2022-04-11 PROCEDURE — 25010000002 PROPOFOL 10 MG/ML EMULSION: Performed by: NURSE ANESTHETIST, CERTIFIED REGISTERED

## 2022-04-11 RX ORDER — PROPOFOL 10 MG/ML
VIAL (ML) INTRAVENOUS AS NEEDED
Status: DISCONTINUED | OUTPATIENT
Start: 2022-04-11 | End: 2022-04-11 | Stop reason: SURG

## 2022-04-11 RX ORDER — SODIUM CHLORIDE 0.9 % (FLUSH) 0.9 %
10 SYRINGE (ML) INJECTION AS NEEDED
Status: DISCONTINUED | OUTPATIENT
Start: 2022-04-11 | End: 2022-04-11 | Stop reason: HOSPADM

## 2022-04-11 RX ORDER — LIDOCAINE HYDROCHLORIDE 20 MG/ML
INJECTION, SOLUTION EPIDURAL; INFILTRATION; INTRACAUDAL; PERINEURAL AS NEEDED
Status: DISCONTINUED | OUTPATIENT
Start: 2022-04-11 | End: 2022-04-11 | Stop reason: SURG

## 2022-04-11 RX ORDER — SODIUM CHLORIDE 9 MG/ML
500 INJECTION, SOLUTION INTRAVENOUS CONTINUOUS PRN
Status: DISCONTINUED | OUTPATIENT
Start: 2022-04-11 | End: 2022-04-11 | Stop reason: HOSPADM

## 2022-04-11 RX ADMIN — SODIUM CHLORIDE 500 ML: 9 INJECTION, SOLUTION INTRAVENOUS at 10:11

## 2022-04-11 RX ADMIN — LIDOCAINE HYDROCHLORIDE 50 MG: 20 INJECTION, SOLUTION EPIDURAL; INFILTRATION; INTRACAUDAL; PERINEURAL at 11:57

## 2022-04-11 RX ADMIN — PROPOFOL 200 MG: 10 INJECTION, EMULSION INTRAVENOUS at 11:57

## 2022-04-11 NOTE — ANESTHESIA PREPROCEDURE EVALUATION
Anesthesia Evaluation     no history of anesthetic complications:  NPO Solid Status: > 8 hours  NPO Liquid Status: > 2 hours           Airway   Mallampati: I  TM distance: >3 FB  Neck ROM: full  No difficulty expected  Dental      Pulmonary    (+) sleep apnea on CPAP,   Cardiovascular   Exercise tolerance: good (4-7 METS)    Patient on routine beta blocker and Beta blocker given within 24 hours of surgery    (+) valvular problems/murmurs MVP,       Neuro/Psych  (-) seizures, TIA, CVA  GI/Hepatic/Renal/Endo    (+)  GERD,  thyroid problem (resolved) hyperthyroidism  (-) no renal disease    Musculoskeletal     Abdominal    Substance History      OB/GYN          Other   arthritis,                    Anesthesia Plan    ASA 2     MAC     intravenous induction     Anesthetic plan, all risks, benefits, and alternatives have been provided, discussed and informed consent has been obtained with: patient.        CODE STATUS:

## 2022-04-11 NOTE — ANESTHESIA POSTPROCEDURE EVALUATION
"Patient: Holli Iyer    Procedure Summary     Date: 04/11/22 Room / Location: [facility] ENDOSCOPY 6 /  [facility]    Anesthesia Start: 1154 Anesthesia Stop: 1220    Procedure: COLONOSCOPY WITH ANESTHESIA (N/A ) Diagnosis:       Hx of colonic polyps      (Hx of colonic polyps [Z86.010])    Surgeons: Selena Thomas MD Provider: Tim Packer CRNA    Anesthesia Type: MAC ASA Status: 2          Anesthesia Type: MAC    Vitals  Vitals Value Taken Time   /49 04/11/22 1219   Temp     Pulse 70 04/11/22 1220   Resp 20 04/11/22 1219   SpO2 94 % 04/11/22 1220   Vitals shown include unvalidated device data.        Post Anesthesia Care and Evaluation    Patient location during evaluation: PHASE II  Patient participation: complete - patient participated  Level of consciousness: awake and alert  Pain management: adequate  Airway patency: patent  Anesthetic complications: No anesthetic complications    Cardiovascular status: acceptable  Respiratory status: acceptable  Hydration status: acceptable    Comments: Blood pressure 109/49, pulse 64, temperature 97.2 °F (36.2 °C), temperature source Temporal, resp. rate 20, height 170.2 cm (67\"), weight 90.7 kg (200 lb), SpO2 94 %, not currently breastfeeding.    Pt discharged from PACU based on erlinda score >8      "

## 2022-04-12 LAB
CYTO UR: NORMAL
LAB AP CASE REPORT: NORMAL
PATH REPORT.FINAL DX SPEC: NORMAL
PATH REPORT.GROSS SPEC: NORMAL

## 2022-04-13 ENCOUNTER — TELEPHONE (OUTPATIENT)
Dept: INTERNAL MEDICINE | Age: 77
End: 2022-04-13

## 2022-04-13 ENCOUNTER — TELEPHONE (OUTPATIENT)
Dept: GASTROENTEROLOGY | Facility: CLINIC | Age: 77
End: 2022-04-13

## 2022-04-13 DIAGNOSIS — F32.89 MENOPAUSAL DEPRESSION: Primary | ICD-10-CM

## 2022-04-13 PROBLEM — K62.1 HYPERPLASTIC RECTAL POLYP: Status: ACTIVE | Noted: 2022-03-15

## 2022-04-13 RX ORDER — CITALOPRAM 10 MG/1
10 TABLET ORAL DAILY
Qty: 90 TABLET | Refills: 3 | Status: SHIPPED | OUTPATIENT
Start: 2022-04-13

## 2022-04-13 NOTE — PROGRESS NOTES
I am writing to inform you that the polyp removed from the colon did not have any cancer. It no longer poses a threat to you since it was completely removed.   Because of the very benign nature of this polyp and where it was, we can stretch out your next colonoscopy to 10 years.    If you have any question, please call our office.    Sincerely,        Selena Thomas MD

## 2022-04-13 NOTE — TELEPHONE ENCOUNTER
Prior Auth obtained, and the copay will be $100. I tried calling Bret to see if it is affordable. He did not answer and his phone does not have VM set up. S/w pt, she needs a refill on Citalopram sent to [facility].

## 2022-04-20 NOTE — PROGRESS NOTES
[facility] - PODIATRY    Today's Date: 04/21/22    Patient Name: Holli Iyer  MRN: [id number]  CSN: [id number]  PCP: Kaley Pearson MD  Referring Provider: No ref. provider found    SUBJECTIVE     Chief Complaint   Patient presents with   • Follow-up     Kaley Pearson MD PCP02/03/2021 FOLLOW UP - BILATERAL FOOT - pt states left hallux and 2nd toe are rubbing against each other and causing pain - pt pain 2/10 tolerable - pt presents left hallux pain, and 2nd left toe pain      HPI: Holli Iyer, a 76 y.o.female, comes to clinic as a(n) established patient complaining of ingrown toenail and complaining of painful toenails. Patient has h/o GERD, Hyperthyroid, Insomnia, MVP, Sleep Apnea. Patient is non-diabetic.  Presents with recurrent pain of left hallux nail due to hallux and 2nd toe rubbing against each other. Has previously deferred any discussion about correction of deformities. Admits pain at 2/10 level and described as sharp. Relates previous treatment(s) including slant back. Denies any constitutional symptoms. No other pedal complaints at this time.    Past Medical History:   Diagnosis Date   • Diverticulosis    • GERD (gastroesophageal reflux disease)    • History of colon polyps    • Hyperthyroidism    • Insomnia    • Irregular heart beat    • MVP (mitral valve prolapse)    • Sleep apnea      Past Surgical History:   Procedure Laterality Date   • BREAST BIOPSY Left    • COLONOSCOPY  02/12/2015    Diverticulosis in the sigmoid colon; One 5mm hyperplastic polyp in the sigmoid colon; The examination was otherwise normal on direct and retroflexion views; Repeat 5 years   • COLONOSCOPY N/A 4/11/2022    Procedure: COLONOSCOPY WITH ANESTHESIA;  Surgeon: Selena Thomas MD;  Location: [facility] ENDOSCOPY;  Service: Gastroenterology;  Laterality: N/A;  pre: hx polyps  post: divertriculosis. polyp.   Kaley Pearson MD   • ENDOSCOPY N/A 10/26/2017    Normal esophagus; A single gastric polyp-biopsied;  Normal stomach; Normal examined duodenum     Family History   Problem Relation Age of Onset   • Heart failure Mother 100   • Ulcerative colitis Mother    • Cancer Father    • Diabetes Brother    • Colon cancer Neg Hx    • Esophageal cancer Neg Hx    • Colon polyps Neg Hx    • Liver cancer Neg Hx    • Rectal cancer Neg Hx    • Stomach cancer Neg Hx    • Liver disease Neg Hx      Social History     Socioeconomic History   • Marital status:    Tobacco Use   • Smoking status: Never Smoker   • Smokeless tobacco: Never Used   Vaping Use   • Vaping Use: Never used   Substance and Sexual Activity   • Alcohol use: Yes     Comment: Rarely-glass of wine   • Drug use: No   • Sexual activity: Not Currently     Allergies   Allergen Reactions   • Declomycin [Demeclocycline] Palpitations   • Sulfa Antibiotics Itching     Current Outpatient Medications   Medication Sig Dispense Refill   • aspirin 81 MG EC tablet Take 81 mg by mouth Daily.     • atenolol (TENORMIN) 25 MG tablet Take 2 tablets by mouth Daily. (Patient taking differently: Take 25 mg by mouth Daily.) 90 tablet 3   • citalopram (CeleXA) 10 MG tablet TAKE 1 TABLET EVERY MORNING (Patient taking differently: Take 10 mg by mouth Daily.) 90 tablet 0   • Lifitegrast (XIIDRA) 5 % ophthalmic solution Administer 1 drop to both eyes 2 (Two) Times a Day.     • vitamin D (ERGOCALCIFEROL) 1.25 MG (45900 UT) capsule capsule Take 1 capsule by mouth Every 7 (Seven) Days. 24 capsule 3     No current facility-administered medications for this visit.     Review of Systems   Constitutional: Negative for chills and fever.   HENT: Negative for congestion.    Respiratory: Negative for shortness of breath.    Cardiovascular: Negative for chest pain and leg swelling.   Gastrointestinal: Negative for constipation, diarrhea, nausea and vomiting.   Musculoskeletal: Positive for arthralgias.        Foot pain   Skin: Negative for wound.        IGTN   Neurological: Negative for numbness.        OBJECTIVE     Vitals:    04/21/22 1441   BP: 118/70   Pulse: 75   SpO2: 96%       PHYSICAL EXAM  GEN:   Accompanied by none.     Foot/Ankle Exam:       General:   Appearance: appears stated age and healthy    Orientation: AAOx3    Affect: appropriate    Gait: unimpaired    Assistance: independent    Shoe Gear:  Casual shoes    VASCULAR      Right Foot Vascularity   Dorsalis pedis:  2+  Posterior tibial:  2+  Skin Temperature: warm    Edema Grading:  None  CFT:  3  Pedal Hair Growth:  Present  Varicosities: mild varicosities       Left Foot Vascularity   Dorsalis pedis:  2+  Posterior tibial:  2+  Skin Temperature: warm    Edema Grading:  None  CFT:  3  Pedal Hair Growth:  Present  Varicosities: mild varicosities        NEUROLOGIC     Right Foot Neurologic   Normal sensation    Light touch sensation:  Normal  Vibratory sensation:  Normal  Hot/Cold sensation: normal    Protective Sensation using [place] Monofilament:  10     Left Foot Neurologic   Normal sensation    Light touch sensation:  Normal  Vibratory sensation:  Normal  Hot/cold sensation: normal    Protective Sensation using [place] Monofilament:  10     MUSCULOSKELETAL      Right Foot Musculoskeletal   Ecchymosis:  None  Tenderness: none    Arch:  Normal  Hammertoe:  Second toe  Hallux valgus: Yes (Medial bony eminence with mild abduction of hallux. Somewhat reducible. )       Left Foot Musculoskeletal   Ecchymosis:  None  Tenderness: toe 1    Arch:  Normal  Hammertoe:  Second toe  Hallux valgus: Yes (Medial bony eminence with mild abduction of hallux. Somewhat reducible.)       MUSCLE STRENGTH     Right Foot Muscle Strength   Foot dorsiflexion:  5  Foot plantar flexion:  5  Foot inversion:  5  Foot eversion:  5     Left Foot Muscle Strength   Foot dorsiflexion:  5  Foot plantar flexion:  5  Foot inversion:  5  Foot eversion:  5     RANGE OF MOTION      Right Foot Range of Motion   Foot and ankle ROM within normal limits       Left  Foot Range of Motion   Foot and ankle ROM within normal limits       DERMATOLOGIC     Right Foot Dermatologic   Skin: skin intact    Nails: abnormally thick    Nails: no ingrown toenail       Left Foot Dermatologic   Skin: skin intact    Nails: abnormally thick, dystrophic nails and ingrown toenail (hallux lateral)        RADIOLOGY/NUCLEAR:  No results found.    LABORATORY/CULTURE RESULTS:      PATHOLOGY RESULTS:       ASSESSMENT/PLAN     Diagnoses and all orders for this visit:    1. Ingrown toenail (Primary)    2. Thickened nails    3. Foot pain, left    4. Hammer toes of both feet    5. Hallux valgus, right    6. Hallux valgus, left      Comprehensive lower extremity examination and evaluation was performed.  Discussed findings and treatment plan including risks, benefits, and treatment options with patient in detail. Patient agreed with treatment plan.  After verbal consent obtained, nail(s) x1 debrided of offending borders with nail nipper without incidence  Patient may maintain nails and calluses at home utilizing emery board or pumice stone between visits as needed   May benefit from PNA if IGTNs continue to be problematic  Patient wishes to remain conservative in regard to bony deformities of the feet. Discussed again that these could be contributing to recurrent issues with ingrowing toenails. Dispensed 1 toe spacer.   An After Visit Summary was printed and given to the patient at discharge, including (if requested) any available informative/educational handouts regarding diagnosis, treatment, or medications. All questions were answered to patient/family satisfaction. Should symptoms fail to improve or worsen they agree to call or return to clinic or to go to the Emergency Department. Discussed the importance of following up with any needed screening tests/labs/specialist appointments and any requested follow-up recommended by me today. Importance of maintaining follow-up discussed and patient accepts that  missed appointments can delay diagnosis and potentially lead to worsening of conditions.  Return if symptoms worsen or fail to improve., or sooner if acute issues arise.        This document has been electronically signed by Shorty Raymond DPM on April 21, 2022 14:58 CDT

## 2022-04-21 ENCOUNTER — OFFICE VISIT (OUTPATIENT)
Dept: PODIATRY | Facility: CLINIC | Age: 77
End: 2022-04-21

## 2022-04-21 VITALS
SYSTOLIC BLOOD PRESSURE: 118 MMHG | HEIGHT: 67 IN | HEART RATE: 75 BPM | DIASTOLIC BLOOD PRESSURE: 70 MMHG | WEIGHT: 203 LBS | OXYGEN SATURATION: 96 % | BODY MASS INDEX: 31.86 KG/M2

## 2022-04-21 DIAGNOSIS — M79.672 FOOT PAIN, LEFT: ICD-10-CM

## 2022-04-21 DIAGNOSIS — M20.12 HALLUX VALGUS, LEFT: ICD-10-CM

## 2022-04-21 DIAGNOSIS — L60.0 INGROWN TOENAIL: Primary | ICD-10-CM

## 2022-04-21 DIAGNOSIS — M20.11 HALLUX VALGUS, RIGHT: ICD-10-CM

## 2022-04-21 DIAGNOSIS — M20.42 HAMMER TOES OF BOTH FEET: ICD-10-CM

## 2022-04-21 DIAGNOSIS — L60.2 THICKENED NAILS: ICD-10-CM

## 2022-04-21 DIAGNOSIS — M20.41 HAMMER TOES OF BOTH FEET: ICD-10-CM

## 2022-04-21 PROCEDURE — 11720 DEBRIDE NAIL 1-5: CPT | Performed by: PODIATRIST

## 2022-05-03 ENCOUNTER — OFFICE VISIT (OUTPATIENT)
Dept: INTERNAL MEDICINE | Age: 77
End: 2022-05-03
Payer: MEDICARE

## 2022-05-03 VITALS
HEART RATE: 70 BPM | HEIGHT: 67 IN | BODY MASS INDEX: 31.55 KG/M2 | DIASTOLIC BLOOD PRESSURE: 68 MMHG | OXYGEN SATURATION: 94 % | SYSTOLIC BLOOD PRESSURE: 120 MMHG | WEIGHT: 201 LBS

## 2022-05-03 DIAGNOSIS — M17.10 ARTHRITIS OF KNEE: ICD-10-CM

## 2022-05-03 DIAGNOSIS — G47.33 OSA (OBSTRUCTIVE SLEEP APNEA): ICD-10-CM

## 2022-05-03 DIAGNOSIS — I10 PRIMARY HYPERTENSION: Primary | ICD-10-CM

## 2022-05-03 DIAGNOSIS — E78.00 PURE HYPERCHOLESTEROLEMIA: ICD-10-CM

## 2022-05-03 DIAGNOSIS — E55.9 VITAMIN D DEFICIENCY: ICD-10-CM

## 2022-05-03 DIAGNOSIS — K21.9 GASTROESOPHAGEAL REFLUX DISEASE WITHOUT ESOPHAGITIS: ICD-10-CM

## 2022-05-03 PROBLEM — K62.1 HYPERPLASTIC RECTAL POLYP: Status: ACTIVE | Noted: 2022-03-15

## 2022-05-03 PROCEDURE — 3017F COLORECTAL CA SCREEN DOC REV: CPT | Performed by: INTERNAL MEDICINE

## 2022-05-03 PROCEDURE — 99204 OFFICE O/P NEW MOD 45 MIN: CPT | Performed by: INTERNAL MEDICINE

## 2022-05-03 PROCEDURE — 1036F TOBACCO NON-USER: CPT | Performed by: INTERNAL MEDICINE

## 2022-05-03 PROCEDURE — 1090F PRES/ABSN URINE INCON ASSESS: CPT | Performed by: INTERNAL MEDICINE

## 2022-05-03 PROCEDURE — G8417 CALC BMI ABV UP PARAM F/U: HCPCS | Performed by: INTERNAL MEDICINE

## 2022-05-03 PROCEDURE — 4040F PNEUMOC VAC/ADMIN/RCVD: CPT | Performed by: INTERNAL MEDICINE

## 2022-05-03 PROCEDURE — G8427 DOCREV CUR MEDS BY ELIG CLIN: HCPCS | Performed by: INTERNAL MEDICINE

## 2022-05-03 PROCEDURE — G8399 PT W/DXA RESULTS DOCUMENT: HCPCS | Performed by: INTERNAL MEDICINE

## 2022-05-03 PROCEDURE — 1123F ACP DISCUSS/DSCN MKR DOCD: CPT | Performed by: INTERNAL MEDICINE

## 2022-05-03 ASSESSMENT — PATIENT HEALTH QUESTIONNAIRE - PHQ9
1. LITTLE INTEREST OR PLEASURE IN DOING THINGS: 0
6. FEELING BAD ABOUT YOURSELF - OR THAT YOU ARE A FAILURE OR HAVE LET YOURSELF OR YOUR FAMILY DOWN: 0
2. FEELING DOWN, DEPRESSED OR HOPELESS: 0
3. TROUBLE FALLING OR STAYING ASLEEP: 0
SUM OF ALL RESPONSES TO PHQ QUESTIONS 1-9: 1
SUM OF ALL RESPONSES TO PHQ9 QUESTIONS 1 & 2: 0
9. THOUGHTS THAT YOU WOULD BE BETTER OFF DEAD, OR OF HURTING YOURSELF: 0
4. FEELING TIRED OR HAVING LITTLE ENERGY: 1
8. MOVING OR SPEAKING SO SLOWLY THAT OTHER PEOPLE COULD HAVE NOTICED. OR THE OPPOSITE, BEING SO FIGETY OR RESTLESS THAT YOU HAVE BEEN MOVING AROUND A LOT MORE THAN USUAL: 0
7. TROUBLE CONCENTRATING ON THINGS, SUCH AS READING THE NEWSPAPER OR WATCHING TELEVISION: 0
SUM OF ALL RESPONSES TO PHQ QUESTIONS 1-9: 1
SUM OF ALL RESPONSES TO PHQ QUESTIONS 1-9: 1
10. IF YOU CHECKED OFF ANY PROBLEMS, HOW DIFFICULT HAVE THESE PROBLEMS MADE IT FOR YOU TO DO YOUR WORK, TAKE CARE OF THINGS AT HOME, OR GET ALONG WITH OTHER PEOPLE: 0
SUM OF ALL RESPONSES TO PHQ QUESTIONS 1-9: 1
5. POOR APPETITE OR OVEREATING: 0

## 2022-05-03 NOTE — PROGRESS NOTES
Chief Complaint   Patient presents with    New Patient     est. care       HPI: Patient is a new patient to me but have known her for a long time she comes in to establish care previously saw Dr. Dominic Wilkerson who is records we had and are reviewing. She is managed for hypertension she also has hyperlipidemia arthritis sleep apnea good bp always--- cholesterol up last time--- having knee problems- has been to Dr Bo Staff and PT- left varicose veins-- bursitis in knees - esophageal spasm -- used to have gerd--- wears cpap - TIA a long time ago  - hyperthyroidism in past took medication twice in past 20 or more years ago.      Past Medical History:   Diagnosis Date    Anxiety     GERD (gastroesophageal reflux disease)     Hypertension     Osteoarthritis     Sleep apnea     Vitamin D deficiency        Past Surgical History:   Procedure Laterality Date    BREAST BIOPSY Right 1980    b9    CATARACT REMOVAL         Family History   Problem Relation Age of Onset    Heart Disease Mother     Cancer Father         bladder    Diabetes Brother     Breast Cancer Maternal Aunt        Social History     Socioeconomic History    Marital status:      Spouse name: Not on file    Number of children: Not on file    Years of education: Not on file    Highest education level: Not on file   Occupational History    Not on file   Tobacco Use    Smoking status: Never Smoker    Smokeless tobacco: Never Used   Substance and Sexual Activity    Alcohol use: Never    Drug use: Never    Sexual activity: Not on file   Other Topics Concern    Not on file   Social History Narrative    Not on file     Social Determinants of Health     Financial Resource Strain: Low Risk     Difficulty of Paying Living Expenses: Not very hard   Food Insecurity: No Food Insecurity    Worried About Running Out of Food in the Last Year: Never true    Wendy of Food in the Last Year: Never true   Transportation Needs:     Lack of Transportation (Medical): Not on file    Lack of Transportation (Non-Medical): Not on file   Physical Activity:     Days of Exercise per Week: Not on file    Minutes of Exercise per Session: Not on file   Stress:     Feeling of Stress : Not on file   Social Connections:     Frequency of Communication with Friends and Family: Not on file    Frequency of Social Gatherings with Friends and Family: Not on file    Attends Congregation Services: Not on file    Active Member of [address] [place] [address] [employer] or Organizations: Not on file    Attends Club or Organization Meetings: Not on file    Marital Status: Not on file   Intimate Partner Violence:     Fear of Current or Ex-Partner: Not on file    Emotionally Abused: Not on file    Physically Abused: Not on file    Sexually Abused: Not on file   Housing Stability:     Unable to Pay for Housing in the Last Year: Not on file    Number of [place] in the Last Year: Not on file    Unstable Housing in the Last Year: Not on file       Allergies   Allergen Reactions    Penicillins      Other reaction(s): Unknown (See Comments)  childhood    Demeclocycline Palpitations    Sulfa Antibiotics Itching       Current Outpatient Medications   Medication Sig Dispense Refill    citalopram (CELEXA) 10 MG tablet Take 1 tablet by mouth Daily 90 tablet 3    aspirin 81 MG EC tablet Take 81 mg by mouth daily      atenolol (TENORMIN) 25 MG tablet 1 tablet daily      Lifitegrast (XIIDRA) 5 % SOLN       ergocalciferol (ERGOCALCIFEROL) 1.25 MG (18800 UT) capsule Take 50,000 Units by mouth once a week       No current facility-administered medications for this visit. Review of Systems   Constitutional: Negative for chills, fatigue and fever. HENT: Negative for congestion and sinus pressure. Eyes: Negative for discharge and redness. Respiratory: Negative for cough and shortness of breath. Cardiovascular: Negative for chest pain, palpitations and leg swelling.    Gastrointestinal: Negative for abdominal distention and abdominal pain. Occasional gerd. Genitourinary: Negative for dysuria, frequency and urgency. Musculoskeletal: Positive for arthralgias. Negative for back pain. Skin: Negative for rash and wound. Neurological: Negative for dizziness, light-headedness and headaches. Psychiatric/Behavioral: Negative for dysphoric mood and sleep disturbance. The patient is not nervous/anxious. /68   Pulse 70   Ht 5' 7\" (1.702 m)   Wt 201 lb (91.2 kg)   SpO2 94%   BMI 31.48 kg/m²   BP Readings from Last 7 Encounters:   05/03/22 120/68     Wt Readings from Last 7 Encounters:   05/03/22 201 lb (91.2 kg)     BMI Readings from Last 7 Encounters:   05/03/22 31.48 kg/m²     Resp Readings from Last 7 Encounters:   No data found for Resp       Physical Exam  Constitutional:       General: She is not in acute distress. Appearance: Normal appearance. She is well-developed. HENT:      Right Ear: External ear normal. Tympanic membrane is not injected. Left Ear: External ear normal. Tympanic membrane is not injected. Mouth/Throat:      Pharynx: No oropharyngeal exudate. Eyes:      General: No scleral icterus. Conjunctiva/sclera: Conjunctivae normal.   Neck:      Thyroid: No thyroid mass or thyromegaly. Vascular: No carotid bruit. Cardiovascular:      Rate and Rhythm: Normal rate and regular rhythm. Heart sounds: S1 normal and S2 normal. No murmur heard. No S3 or S4 sounds. Pulmonary:      Effort: Pulmonary effort is normal. No respiratory distress. Breath sounds: Normal breath sounds. No wheezing or rales. Chest:   Breasts:      Right: No supraclavicular adenopathy. Left: No supraclavicular adenopathy. Abdominal:      General: Bowel sounds are normal. There is no distension. Palpations: Abdomen is soft. There is no mass. Tenderness: There is no abdominal tenderness. Musculoskeletal:      Cervical back: Neck supple. Right lower leg: No edema. Left lower leg: No edema. Comments: With some arthritis change   Lymphadenopathy:      Cervical: No cervical adenopathy. Upper Body:      Right upper body: No supraclavicular adenopathy. Left upper body: No supraclavicular adenopathy. Skin:     Findings: No rash. Neurological:      Mental Status: She is alert and oriented to person, place, and time. Cranial Nerves: No cranial nerve deficit. Psychiatric:         Mood and Affect: Mood normal.         No results found for this or any previous visit. ASSESSMENT/ PLAN:  1. Primary hypertension  Her blood pressure is in good control with the current medical regimen we reviewed Dr. Pitts Appl records we spent time going through her history we will continue her current meds we reviewed review BS labs interpreted and discussed  - CBC; Future  - Comprehensive Metabolic Panel; Future  - TSH; Future    2. Pure hypercholesterolemia  In February her cholesterol was 193 HDL 47  keep working on healthy diet and activity hopefully can get knee feeling better increase activity  - Lipid Panel; Future    3. Vitamin D deficiency  Replace vitamin D as needed and follow  - Vitamin D 25 Hydroxy; Future    4. Gastroesophageal reflux disease without esophagitis  With NSAIDs avoid eating late at night reflux precautions if worse symptoms we need to know    5. LAUREN (obstructive sleep apnea)  Patient wears a CPAP nightly benefits from its use    6. Arthritis of knee  Follow-up with Dr. Fina Louis    Chart, medications, labs, vaccines reviewed. Keep up to date with routine care and follow up. Call with any problems or complaints. Keep up to date with routine screening recomendations and vaccines.    Reviewed and updated her routine medical care and screening she is up-to-date through Dr. Vanessa Cox and we will keep up with her Medicare wellness and close follow-up

## 2022-05-17 ASSESSMENT — ENCOUNTER SYMPTOMS
ABDOMINAL DISTENTION: 0
COUGH: 0
ABDOMINAL PAIN: 0
BACK PAIN: 0
SINUS PRESSURE: 0
SHORTNESS OF BREATH: 0
EYE DISCHARGE: 0
EYE REDNESS: 0

## 2022-07-05 ENCOUNTER — TELEPHONE (OUTPATIENT)
Dept: INTERNAL MEDICINE | Age: 77
End: 2022-07-05

## 2022-07-05 DIAGNOSIS — M71.9 BURSITIS OF MULTIPLE SITES: Primary | ICD-10-CM

## 2022-07-05 NOTE — TELEPHONE ENCOUNTER
Dr Jen Dietz told her that she has chronic bursitis of both knees and a nurse friend of hers told her to see Dr Raegan Fleming.

## 2022-07-05 NOTE — TELEPHONE ENCOUNTER
----- Message from [facility] sent at 7/5/2022 12:24 PM CDT -----  Subject: Message to Provider    QUESTIONS  Information for Provider? patient called in about a referral for Dr Oliva Becerra for rheumatology she would like a call back about referral request   .  ---------------------------------------------------------------------------  --------------  [address] [employer]  [phone]; OK to leave message on voicemail  ---------------------------------------------------------------------------  --------------  SCRIPT ANSWERS  Relationship to Patient?  Self

## 2022-11-07 ENCOUNTER — OFFICE VISIT (OUTPATIENT)
Dept: INTERNAL MEDICINE | Age: 77
End: 2022-11-07
Payer: MEDICARE

## 2022-11-07 VITALS
TEMPERATURE: 98.9 F | HEART RATE: 64 BPM | DIASTOLIC BLOOD PRESSURE: 78 MMHG | OXYGEN SATURATION: 96 % | SYSTOLIC BLOOD PRESSURE: 124 MMHG

## 2022-11-07 DIAGNOSIS — E03.9 HYPOTHYROIDISM, UNSPECIFIED TYPE: Primary | ICD-10-CM

## 2022-11-07 DIAGNOSIS — R49.9 CHANGE IN VOICE: ICD-10-CM

## 2022-11-07 DIAGNOSIS — I34.1 MITRAL VALVE PROLAPSE: ICD-10-CM

## 2022-11-07 DIAGNOSIS — E03.9 HYPOTHYROIDISM, UNSPECIFIED TYPE: ICD-10-CM

## 2022-11-07 LAB — TSH SERPL DL<=0.05 MIU/L-ACNC: 3.12 UIU/ML (ref 0.27–4.2)

## 2022-11-07 PROCEDURE — 1090F PRES/ABSN URINE INCON ASSESS: CPT | Performed by: NURSE PRACTITIONER

## 2022-11-07 PROCEDURE — G8417 CALC BMI ABV UP PARAM F/U: HCPCS | Performed by: NURSE PRACTITIONER

## 2022-11-07 PROCEDURE — G8484 FLU IMMUNIZE NO ADMIN: HCPCS | Performed by: NURSE PRACTITIONER

## 2022-11-07 PROCEDURE — G8399 PT W/DXA RESULTS DOCUMENT: HCPCS | Performed by: NURSE PRACTITIONER

## 2022-11-07 PROCEDURE — 1036F TOBACCO NON-USER: CPT | Performed by: NURSE PRACTITIONER

## 2022-11-07 PROCEDURE — 1123F ACP DISCUSS/DSCN MKR DOCD: CPT | Performed by: NURSE PRACTITIONER

## 2022-11-07 PROCEDURE — 99213 OFFICE O/P EST LOW 20 MIN: CPT | Performed by: NURSE PRACTITIONER

## 2022-11-07 PROCEDURE — G8427 DOCREV CUR MEDS BY ELIG CLIN: HCPCS | Performed by: NURSE PRACTITIONER

## 2022-11-07 PROCEDURE — 3017F COLORECTAL CA SCREEN DOC REV: CPT | Performed by: NURSE PRACTITIONER

## 2022-11-07 ASSESSMENT — ENCOUNTER SYMPTOMS
ABDOMINAL PAIN: 0
VOMITING: 0
VOICE CHANGE: 1
ABDOMINAL DISTENTION: 0
EYE DISCHARGE: 0
DIARRHEA: 0
EYE ITCHING: 0
TROUBLE SWALLOWING: 0
SORE THROAT: 0
SHORTNESS OF BREATH: 0
WHEEZING: 0
COLOR CHANGE: 0
COUGH: 0
BLOOD IN STOOL: 0
CHOKING: 0
CONSTIPATION: 0
STRIDOR: 0
NAUSEA: 0

## 2022-11-07 NOTE — ASSESSMENT & PLAN NOTE
We will check a TSH also increase the beta-blocker if that does not work we will try PPIs if that does not work refer to GI

## 2022-11-07 NOTE — PROGRESS NOTES
[facility] PHYSICIAN SERVICES  [facility] INTERNAL MEDICINE  [address]  [facility] [place] [address]  Dept: [phone]  Dept Fax: [phone]: [phone]    Sanjuanita Kothari (:  1945) is a 78 y.o. female,Established patient  with Gogo Coyle , here for evaluation of the following chief complaint(s): Other (Voice coming and going for 3 days)      Sanjuanita Kothari is a 78 y.o. female who presents today for her medical conditions/complaints as noted below. Sanjuanita Kothari is c/diana Other (Voice coming and going for 3 days)        HPI:     Chief Complaint   Patient presents with    Other     Voice coming and going for 3 days     HPI    Voice changing;  she had this in the past with hyperthyroidism; and another after missing a dose of BB:  it comes and goes all day; This has been coming and going for the last 3 days   2. Esphogeal spasms   3. GERD;  no symptoms;  with that and is on no PPI   4.   MVP    Past Medical History:   Diagnosis Date    Anxiety     GERD (gastroesophageal reflux disease)     Hypertension     Osteoarthritis     Sleep apnea     Vitamin D deficiency       Past Surgical History:   Procedure Laterality Date    BREAST BIOPSY Right     b9    CATARACT REMOVAL         Vitals 202218   SYSTOLIC 829 662   DIASTOLIC 78 68   Pulse 64 70   Temp 98.9 -   SpO2 96 94   Weight - 201 lb   Height - 5' 7\"   Body mass index - 31.48 kg/m2   Some recent data might be hidden       Family History   Problem Relation Age of Onset    Heart Disease Mother     Cancer Father         bladder    Diabetes Brother     Breast Cancer Maternal Aunt        Social History     Tobacco Use    Smoking status: Never    Smokeless tobacco: Never   Substance Use Topics    Alcohol use: Never      Current Outpatient Medications   Medication Sig Dispense Refill    citalopram (CELEXA) 10 MG tablet Take 1 tablet by mouth Daily 90 tablet 3    aspirin 81 MG EC tablet Take 81 mg by mouth daily      atenolol (TENORMIN) 25 MG tablet 1 tablet daily      Lifitegrast (XIIDRA) 5 % SOLN       ergocalciferol (ERGOCALCIFEROL) 1.25 MG (82156 UT) capsule Take 50,000 Units by mouth once a week       No current facility-administered medications for this visit. Allergies   Allergen Reactions    Penicillins      Other reaction(s): Unknown (See Comments)  childhood    Demeclocycline Palpitations    Sulfa Antibiotics Itching       Health Maintenance   Topic Date Due    Annual Wellness Visit (AWV)  Never done    COVID-19 Vaccine (5 - Booster for Moderna series) 05/25/2022    Flu vaccine (1) 08/01/2022    Depression Monitoring  05/03/2023    Lipids  02/14/2027    Colorectal Cancer Screen  04/11/2027    DTaP/Tdap/Td vaccine (4 - Td or Tdap) 06/28/2031    DEXA (modify frequency per FRAX score)  Completed    Shingles vaccine  Completed    Pneumococcal 65+ years Vaccine  Completed    Hepatitis C screen  Completed    Hepatitis A vaccine  Aged Out    Hib vaccine  Aged Out    Meningococcal (ACWY) vaccine  Aged Out       No results found for: LABA1C  No results found for: PSA, PSADIA  No results found for: TSH]  No results found for: NA, K, CL, CO2, BUN, CREATININE, GLUCOSE, CALCIUM, PROT, LABALBU, BILITOT, ALKPHOS, AST, ALT, LABGLOM, GFRAA, AGRATIO, GLOB  No results found for: CHOL  No results found for: TRIG  No results found for: HDL  No results found for: LDLCHOLESTEROL, LDLCALC  No results found for: NA, K, CL, CO2, BUN, CREATININE, GLUCOSE, CALCIUM   No results found for: WBC, HGB, HCT, MCV, PLT, LABLYMP, MID, GRAN, LYMPHOPCT, MIDPERCENT, GRANULOCYTES, RBC, MCH, MCHC, RDW  No results found for: VITD25  Labs reviewed from 2/2022    Subjective:      Review of Systems   Constitutional:  Negative for fatigue, fever and unexpected weight change. HENT:  Positive for voice change. Negative for ear discharge, ear pain, mouth sores, sore throat and trouble swallowing. Eyes:  Negative for discharge, itching and visual disturbance.    Respiratory: Negative for cough, choking, shortness of breath, wheezing and stridor. Cardiovascular:  Negative for chest pain, palpitations and leg swelling. Gastrointestinal:  Negative for abdominal distention, abdominal pain, blood in stool, constipation, diarrhea, nausea and vomiting. Endocrine: Negative for cold intolerance, polydipsia and polyuria. Genitourinary:  Negative for difficulty urinating, dysuria, frequency and urgency. Musculoskeletal:  Negative for arthralgias and gait problem. Skin:  Negative for color change and rash. Allergic/Immunologic: Negative for food allergies and immunocompromised state. Neurological:  Negative for dizziness, tremors, syncope, speech difficulty, weakness and headaches. Hematological:  Negative for adenopathy. Does not bruise/bleed easily. Psychiatric/Behavioral:  Negative for confusion and hallucinations. Objective:     Physical Exam  Neck:      Thyroid: No thyroid mass, thyromegaly or thyroid tenderness. Musculoskeletal:      Cervical back: Normal range of motion and neck supple. No tenderness. /78   Pulse 64   Temp 98.9 °F (37.2 °C)   SpO2 96%           Assessment:      Problem List       Change in voice     We will check a TSH also increase the beta-blocker if that does not work we will try PPIs if that does not work refer to GI          Hypothyroidism - Primary      TSH today         Relevant Orders    TSH    Mitral valve prolapse     She is on low-dose beta-blocker          Relevant Medications    aspirin 81 MG EC tablet    atenolol (TENORMIN) 25 MG tablet       Plan:        Patient given educational materials - see patient instructions. Discussed use, benefit, and side effects of prescribed medications. Allpatient questions answered. Pt voiced understanding. Reviewed health maintenance. Instructed to continue current medications, diet and exercise. Patient agreed with treatment plan. Follow up as directed.    MEDICATIONS:  No orders of the defined types were placed in this encounter. ORDERS:  Orders Placed This Encounter   Procedures    TSH       Follow-up:  No follow-ups on file. PATIENT INSTRUCTIONS:  Patient Instructions    Change in Voice; Check tsh today   Increase BB  to 25 twice daily   If this doesn't help go back to usual dose of 25 daily    Then start OTC prilosec 20 twice daily    If none of that works, call and we can refer to Dr Eliza Farah  pt choice; Electronically signed by SHERYL Watkins on 11/7/2022 at 4:27 PM    @    [employer]/transcription disclaimer:  Much of this encounter note is electronic transcription/translation of spoken language to printed texts. The electronic translation of spoken language may be erroneous, or at times,nonsensical words or phrases may be inadvertently transcribed.   Although I have reviewed the note for such errors, some may still exist.

## 2022-11-07 NOTE — PATIENT INSTRUCTIONS
Change in Voice;    Check tsh today   Increase BB  to 25 twice daily   If this doesn't help go back to usual dose of 25 daily    Then start OTC prilosec 20 twice daily    If none of that works, call and we can refer to Dr Nathan Taylor  pt choice;
 used

## 2023-02-17 DIAGNOSIS — E55.9 VITAMIN D DEFICIENCY: ICD-10-CM

## 2023-02-17 DIAGNOSIS — I10 PRIMARY HYPERTENSION: ICD-10-CM

## 2023-02-17 DIAGNOSIS — E78.00 PURE HYPERCHOLESTEROLEMIA: ICD-10-CM

## 2023-02-17 LAB
ALBUMIN SERPL-MCNC: 4.4 G/DL (ref 3.5–5.2)
ALP BLD-CCNC: 99 U/L (ref 35–104)
ALT SERPL-CCNC: 17 U/L (ref 5–33)
ANION GAP SERPL CALCULATED.3IONS-SCNC: 14 MMOL/L (ref 7–19)
AST SERPL-CCNC: 15 U/L (ref 5–32)
BILIRUB SERPL-MCNC: 0.6 MG/DL (ref 0.2–1.2)
BUN BLDV-MCNC: 18 MG/DL (ref 8–23)
CALCIUM SERPL-MCNC: 9.4 MG/DL (ref 8.8–10.2)
CHLORIDE BLD-SCNC: 103 MMOL/L (ref 98–111)
CHOLESTEROL, TOTAL: 196 MG/DL (ref 160–199)
CO2: 24 MMOL/L (ref 22–29)
CREAT SERPL-MCNC: 0.6 MG/DL (ref 0.5–0.9)
GFR SERPL CREATININE-BSD FRML MDRD: >60 ML/MIN/{1.73_M2}
GLUCOSE BLD-MCNC: 108 MG/DL (ref 74–109)
HCT VFR BLD CALC: 42.6 % (ref 37–47)
HDLC SERPL-MCNC: 64 MG/DL (ref 65–121)
HEMOGLOBIN: 13.6 G/DL (ref 12–16)
LDL CHOLESTEROL CALCULATED: 116 MG/DL
MCH RBC QN AUTO: 28.8 PG (ref 27–31)
MCHC RBC AUTO-ENTMCNC: 31.9 G/DL (ref 33–37)
MCV RBC AUTO: 90.1 FL (ref 81–99)
PDW BLD-RTO: 12.4 % (ref 11.5–14.5)
PLATELET # BLD: 245 K/UL (ref 130–400)
PMV BLD AUTO: 10.1 FL (ref 9.4–12.3)
POTASSIUM SERPL-SCNC: 4.3 MMOL/L (ref 3.5–5)
RBC # BLD: 4.73 M/UL (ref 4.2–5.4)
SODIUM BLD-SCNC: 141 MMOL/L (ref 136–145)
TOTAL PROTEIN: 6.8 G/DL (ref 6.6–8.7)
TRIGL SERPL-MCNC: 82 MG/DL (ref 0–149)
TSH SERPL DL<=0.05 MIU/L-ACNC: 1.18 UIU/ML (ref 0.27–4.2)
VITAMIN D 25-HYDROXY: 49 NG/ML
WBC # BLD: 8.7 K/UL (ref 4.8–10.8)

## 2023-02-20 ENCOUNTER — TELEPHONE (OUTPATIENT)
Dept: INTERNAL MEDICINE | Age: 78
End: 2023-02-20

## 2023-02-20 NOTE — TELEPHONE ENCOUNTER
----- Message from Justin Ratliff sent at 2/20/2023  9:06 AM CST -----  Subject: Appointment Request    Reason for Call: Established Patient Appointment needed: Routine Existing   Condition Follow Up    QUESTIONS    Reason for appointment request? Available appointments did not meet   patient need     Additional Information for Provider? Patient needs to cancel AWV appt on   02/23 and reschedule . She has already done bloodwork, can't do Tues or   Thursday in am as she has class.   ---------------------------------------------------------------------------  --------------  Ac [facility] INFO  [phone]; OK to leave message on voicemail  ---------------------------------------------------------------------------  --------------  SCRIPT ANSWERS  COVID Screen: Neisha Noel

## 2023-02-28 DIAGNOSIS — E55.9 VITAMIN D DEFICIENCY: Primary | ICD-10-CM

## 2023-02-28 RX ORDER — ERGOCALCIFEROL 1.25 MG/1
50000 CAPSULE ORAL WEEKLY
Qty: 12 CAPSULE | Refills: 3 | Status: SHIPPED | OUTPATIENT
Start: 2023-02-28

## 2023-02-28 RX ORDER — ATENOLOL 25 MG/1
TABLET ORAL
Qty: 90 TABLET | Refills: 7 | OUTPATIENT
Start: 2023-02-28

## 2023-03-07 RX ORDER — ATENOLOL 25 MG/1
25 TABLET ORAL DAILY
Qty: 7 TABLET | Refills: 0 | Status: SHIPPED | OUTPATIENT
Start: 2023-03-07 | End: 2023-03-14

## 2023-03-07 RX ORDER — ATENOLOL 25 MG/1
25 TABLET ORAL DAILY
Qty: 90 TABLET | Refills: 3 | Status: SHIPPED | OUTPATIENT
Start: 2023-03-07 | End: 2024-03-01

## 2023-03-07 NOTE — TELEPHONE ENCOUNTER
S/w pt, she needs a 90-day refill on Atenolol sent to [facility]. States she will run out before it gets here and needs a one week supply sent to [facility].     Requested Prescriptions     Pending Prescriptions Disp Refills    atenolol (TENORMIN) 25 MG tablet 7 tablet 0     Sig: Take 1 tablet by mouth daily for 7 days    atenolol (TENORMIN) 25 MG tablet 90 tablet 3     Sig: Take 1 tablet by mouth daily     Last office visit : 11/07/22   Next office visit : 03/30/23

## 2023-03-13 RX ORDER — ATENOLOL 25 MG/1
25 TABLET ORAL DAILY
Qty: 7 TABLET | Refills: 0 | Status: SHIPPED | OUTPATIENT
Start: 2023-03-13 | End: 2023-03-20

## 2023-03-13 NOTE — TELEPHONE ENCOUNTER
Sakina Posadas called to request a refill on her medication.       Last office visit : Visit date not found   Next office visit : Visit date not found     Requested Prescriptions     Pending Prescriptions Disp Refills    atenolol (TENORMIN) 25 MG tablet [Pharmacy Med Name: ATENOLOL 25MG TABLETS] 7 tablet 0     Sig: TAKE 1 TABLET BY MOUTH DAILY FOR 7 DAYS            Jenelle Raygoza LPN

## 2023-03-14 ENCOUNTER — HOSPITAL ENCOUNTER (OUTPATIENT)
Dept: GENERAL RADIOLOGY | Age: 78
Discharge: HOME | End: 2023-03-14
Payer: MEDICARE

## 2023-03-14 ENCOUNTER — OFFICE VISIT (OUTPATIENT)
Dept: INTERNAL MEDICINE | Age: 78
End: 2023-03-14

## 2023-03-14 VITALS
DIASTOLIC BLOOD PRESSURE: 68 MMHG | BODY MASS INDEX: 31.48 KG/M2 | OXYGEN SATURATION: 94 % | WEIGHT: 201 LBS | SYSTOLIC BLOOD PRESSURE: 126 MMHG | HEART RATE: 72 BPM | TEMPERATURE: 97.8 F

## 2023-03-14 DIAGNOSIS — M25.511 ACUTE PAIN OF RIGHT SHOULDER: Primary | ICD-10-CM

## 2023-03-14 DIAGNOSIS — M25.511 ACUTE PAIN OF RIGHT SHOULDER: ICD-10-CM

## 2023-03-14 PROCEDURE — 73030 X-RAY EXAM OF SHOULDER: CPT | Performed by: RADIOLOGY

## 2023-03-14 PROCEDURE — 73030 X-RAY EXAM OF SHOULDER: CPT

## 2023-03-14 RX ORDER — CYCLOSPORINE 0.5 MG/ML
EMULSION OPHTHALMIC
COMMUNITY
Start: 2023-03-06

## 2023-03-14 SDOH — ECONOMIC STABILITY: FOOD INSECURITY: WITHIN THE PAST 12 MONTHS, YOU WORRIED THAT YOUR FOOD WOULD RUN OUT BEFORE YOU GOT MONEY TO BUY MORE.: NEVER TRUE

## 2023-03-14 SDOH — ECONOMIC STABILITY: INCOME INSECURITY: HOW HARD IS IT FOR YOU TO PAY FOR THE VERY BASICS LIKE FOOD, HOUSING, MEDICAL CARE, AND HEATING?: NOT HARD AT ALL

## 2023-03-14 SDOH — ECONOMIC STABILITY: FOOD INSECURITY: WITHIN THE PAST 12 MONTHS, THE FOOD YOU BOUGHT JUST DIDN'T LAST AND YOU DIDN'T HAVE MONEY TO GET MORE.: NEVER TRUE

## 2023-03-14 SDOH — ECONOMIC STABILITY: HOUSING INSECURITY
IN THE LAST 12 MONTHS, WAS THERE A TIME WHEN YOU DID NOT HAVE A STEADY PLACE TO SLEEP OR SLEPT IN A SHELTER (INCLUDING NOW)?: NO

## 2023-03-14 NOTE — PROGRESS NOTES
[facility] PHYSICIAN SERVICES  [address] [facility] INTERNAL MEDICINE  [address] [address] [place] [address]  Dept: [phone]  Dept Fax: [phone]: 119 Maryana Weiner (:  1945) is a 78 y.o. female,Established patient  with Billy Lat, here for evaluation of the following chief complaint(s): Shoulder Pain (Right shoulder pain )      Carine Farmer is a 78 y.o. female who presents today for her medical conditions/complaints as noted below.   Carine Farmer is c/diana Shoulder Pain (Right shoulder pain )        HPI:     Chief Complaint   Patient presents with    Shoulder Pain     Right shoulder pain      HPI    Right shoulder pain that has started about a month ago;  she has been doing a lot of computer work on a laptop that is the only stressor she thinks she has;        Past Medical History:   Diagnosis Date    Anxiety     GERD (gastroesophageal reflux disease)     Hypertension     Osteoarthritis     Sleep apnea     Vitamin D deficiency       Past Surgical History:   Procedure Laterality Date    BREAST BIOPSY Right     b9    CATARACT REMOVAL         Vitals 3/14/2023 2022 7734   SYSTOLIC 909 504 989   DIASTOLIC 68 78 68   Pulse 72 64 70   Temp 97.8 98.9 -   SpO2 94 96 94   Weight 201 lb - 201 lb   Height - - 5' 7\"   Body mass index - - 31.48 kg/m2   Some recent data might be hidden       Family History   Problem Relation Age of Onset    Heart Disease Mother     Cancer Father         bladder    Diabetes Brother     Breast Cancer Maternal Aunt        Social History     Tobacco Use    Smoking status: Never    Smokeless tobacco: Never   Substance Use Topics    Alcohol use: Never      Current Outpatient Medications   Medication Sig Dispense Refill    RESTASIS 0.05 % ophthalmic emulsion       diclofenac sodium (VOLTAREN) 1 % GEL Apply 4 g topically 4 times daily 1 g 1    atenolol (TENORMIN) 25 MG tablet TAKE 1 TABLET BY MOUTH DAILY FOR 7 DAYS 7 tablet 0    atenolol (TENORMIN) 25 MG tablet Take 1 tablet by mouth daily 90 tablet 3    ergocalciferol (ERGOCALCIFEROL) 1.25 MG (84357 UT) capsule Take 1 capsule by mouth once a week 12 capsule 3    citalopram (CELEXA) 10 MG tablet Take 1 tablet by mouth Daily 90 tablet 3    aspirin 81 MG EC tablet Take 81 mg by mouth daily      Lifitegrast (XIIDRA) 5 % SOLN        No current facility-administered medications for this visit.      Allergies   Allergen Reactions    Penicillins      Other reaction(s): Unknown (See Comments)  childhood    Demeclocycline Palpitations    Sulfa Antibiotics Itching       Health Maintenance   Topic Date Due    Annual Wellness Visit (AWV)  Never done    Depression Monitoring  05/03/2023    Colorectal Cancer Screen  04/11/2027    Lipids  02/17/2028    DTaP/Tdap/Td vaccine (4 - Td or Tdap) 06/28/2031    DEXA (modify frequency per FRAX score)  Completed    Flu vaccine  Completed    Shingles vaccine  Completed    Pneumococcal 65+ years Vaccine  Completed    COVID-19 Vaccine  Completed    Hepatitis C screen  Completed    Hepatitis A vaccine  Aged Out    Hib vaccine  Aged Out    Meningococcal (ACWY) vaccine  Aged Out       No results found for: LABA1C  No results found for: PSA, PSADIA  TSH   Date Value Ref Range Status   02/17/2023 1.180 0.270 - 4.200 uIU/mL Final   ]  Lab Results   Component Value Date     02/17/2023    K 4.3 02/17/2023     02/17/2023    CO2 24 02/17/2023    BUN 18 02/17/2023    CREATININE 0.6 02/17/2023    GLUCOSE 108 02/17/2023    CALCIUM 9.4 02/17/2023    PROT 6.8 02/17/2023    LABALBU 4.4 02/17/2023    BILITOT 0.6 02/17/2023    ALKPHOS 99 02/17/2023    AST 15 02/17/2023    ALT 17 02/17/2023    LABGLOM >60 02/17/2023     Lab Results   Component Value Date    CHOL 196 02/17/2023     Lab Results   Component Value Date    TRIG 82 02/17/2023     Lab Results   Component Value Date    HDL 64 (L) 02/17/2023     Lab Results   Component Value Date    LDLCALC 116 02/17/2023     Lab Results   Component Value Date/Time  02/17/2023 08:13 AM    K 4.3 02/17/2023 08:13 AM     02/17/2023 08:13 AM    CO2 24 02/17/2023 08:13 AM    BUN 18 02/17/2023 08:13 AM    CREATININE 0.6 02/17/2023 08:13 AM    GLUCOSE 108 02/17/2023 08:13 AM    CALCIUM 9.4 02/17/2023 08:13 AM      Lab Results   Component Value Date    WBC 8.7 02/17/2023    HGB 13.6 02/17/2023    HCT 42.6 02/17/2023    MCV 90.1 02/17/2023     02/17/2023    RBC 4.73 02/17/2023    MCH 28.8 02/17/2023    MCHC 31.9 (L) 02/17/2023    RDW 12.4 02/17/2023     Lab Results   Component Value Date    VITD25 49.0 02/17/2023       Diagnostics reviewed from today     Subjective:      Review of Systems   Musculoskeletal:  Positive for arthralgias. Objective:     Physical Exam  Musculoskeletal:      Comments: ROM is normal but painful      /68   Pulse 72   Temp 97.8 °F (36.6 °C)   Wt 201 lb (91.2 kg)   SpO2 94%   BMI 31.48 kg/m²           Assessment:      Problem List       Acute pain of right shoulder - Primary     Xray and voltaren with warm moist compresses             Relevant Orders    XR SHOULDER RIGHT (MIN 2 VIEWS)       Plan:        Patient given educational materials - see patient instructions. Discussed use, benefit, and side effects of prescribed medications. Allpatient questions answered. Pt voiced understanding. Reviewed health maintenance. Instructed to continue current medications, diet and exercise. Patient agreed with treatment plan. Follow up as directed. MEDICATIONS:  Orders Placed This Encounter   Medications    diclofenac sodium (VOLTAREN) 1 % GEL     Sig: Apply 4 g topically 4 times daily     Dispense:  1 g     Refill:  1           ORDERS:  Orders Placed This Encounter   Procedures    XR SHOULDER RIGHT (MIN 2 VIEWS)         Follow-up:  No follow-ups on file.     PATIENT INSTRUCTIONS:  Patient Instructions    Right shoulder pain   Xray today   Use voltaren cream  and warm moist heat 3 times a dailly   If you have pain between those times, you can use [place] dream cream and continued heat   Electronically signed by SHERYL Woods on 3/14/2023 at 2:11 PM    @    Lois/transcription disclaimer:  Much of this encounter note is electronic transcription/translation of spoken language to printed texts. The electronic translation of spoken language may be erroneous, or at times,nonsensical words or phrases may be inadvertently transcribed.   Although I have reviewed the note for such errors, some may still exist.

## 2023-03-14 NOTE — PATIENT INSTRUCTIONS
Right shoulder pain   Xray today   Use voltaren cream  and warm moist heat 3 times a dailly   If you have pain between those times, you can use [place] dream cream and continued heat

## 2023-03-21 DIAGNOSIS — F32.89 MENOPAUSAL DEPRESSION: ICD-10-CM

## 2023-03-21 RX ORDER — CITALOPRAM 10 MG/1
TABLET ORAL
Qty: 90 TABLET | Refills: 1 | Status: SHIPPED | OUTPATIENT
Start: 2023-03-21

## 2023-03-21 NOTE — TELEPHONE ENCOUNTER
Goyo Ramírez called requesting a refill of the below medication which has been pended for you:     Requested Prescriptions     Pending Prescriptions Disp Refills    citalopram (CELEXA) 10 MG tablet [Pharmacy Med Name: CITALOPRAM HYDROBROMIDE TABS 10MG] 90 tablet 1     Sig: TAKE 1 TABLET DAILY       Last Appointment Date: 5/3/2022  Next Appointment Date: 3/30/2023    Allergies   Allergen Reactions    Penicillins      Other reaction(s): Unknown (See Comments)  childhood    Demeclocycline Palpitations    Sulfa Antibiotics Itching

## 2023-04-14 ENCOUNTER — OFFICE VISIT (OUTPATIENT)
Dept: PODIATRY | Facility: CLINIC | Age: 78
End: 2023-04-14
Payer: MEDICARE

## 2023-04-14 VITALS
WEIGHT: 205 LBS | OXYGEN SATURATION: 98 % | SYSTOLIC BLOOD PRESSURE: 110 MMHG | HEIGHT: 67 IN | HEART RATE: 68 BPM | BODY MASS INDEX: 32.18 KG/M2 | DIASTOLIC BLOOD PRESSURE: 70 MMHG

## 2023-04-14 DIAGNOSIS — M20.41 HAMMER TOES OF BOTH FEET: ICD-10-CM

## 2023-04-14 DIAGNOSIS — M79.675 GREAT TOE PAIN, LEFT: ICD-10-CM

## 2023-04-14 DIAGNOSIS — L60.0 INGROWN LEFT GREATER TOENAIL: Primary | ICD-10-CM

## 2023-04-14 DIAGNOSIS — M20.42 HAMMER TOES OF BOTH FEET: ICD-10-CM

## 2023-04-14 DIAGNOSIS — M20.12 HALLUX VALGUS, LEFT: ICD-10-CM

## 2023-04-14 PROBLEM — F41.9 ANXIETY: Status: ACTIVE | Noted: 2023-04-14

## 2023-04-14 PROCEDURE — 11720 DEBRIDE NAIL 1-5: CPT | Performed by: PODIATRIST

## 2023-04-14 PROCEDURE — 99213 OFFICE O/P EST LOW 20 MIN: CPT | Performed by: PODIATRIST

## 2023-04-14 PROCEDURE — 1160F RVW MEDS BY RX/DR IN RCRD: CPT | Performed by: PODIATRIST

## 2023-04-14 PROCEDURE — 3078F DIAST BP <80 MM HG: CPT | Performed by: PODIATRIST

## 2023-04-14 PROCEDURE — 3074F SYST BP LT 130 MM HG: CPT | Performed by: PODIATRIST

## 2023-04-14 PROCEDURE — 1159F MED LIST DOCD IN RCRD: CPT | Performed by: PODIATRIST

## 2023-04-14 RX ORDER — CYCLOSPORINE 0.5 MG/ML
EMULSION OPHTHALMIC
COMMUNITY
Start: 2023-03-06

## 2023-04-14 NOTE — PROGRESS NOTES
[facility] - PODIATRY    Today's Date: 04/14/23    Patient Name: Holli Iyer  MRN: [id number]  CSN: [id number]  PCP: Kaley Pearson MD  Referring Provider: No ref. provider found    SUBJECTIVE     Chief Complaint   Patient presents with   • Follow-up     Kaley Pearson MD 03/11/2022 f/u new prob, poss ingrown nail-pt states she is here for lt great toe nail. States she stumped it on the bed-pt reports 3/10 when something is touching it     HPI: Holli Iyer, a 77 y.o.female, comes to clinic as a(n) established patient complaining of ingrown toenail and complaining of painful toenails. Patient has h/o GERD, Hyperthyroid, Insomnia, MVP, Sleep Apnea. Patient is non-diabetic. States that around 6 weeks ago she bumped her left great toe on the edge of the bed and has been dealing with painful nail since that time. Has been wearing toe spacer to prevent toes from rubbing and states that this has been effective in preventing recurrent IGTN. Admits pain at 3/10 level and described as sharp. Relates previous treatment(s) including slant back, toe spacer. Denies any constitutional symptoms. No other pedal complaints at this time.    Past Medical History:   Diagnosis Date   • Anemia as a child   • Asthma bronchial asthma   • Diverticulosis    • GERD (gastroesophageal reflux disease)    • History of colon polyps    • Hyperthyroidism    • Ingrown toenail    • Insomnia    • Irregular heart beat    • MVP (mitral valve prolapse)    • Shin splints in 40's   • Sleep apnea    • Stress fracture    • Stroke TIA in 1990s   • TIA (transient ischemic attack) above     Past Surgical History:   Procedure Laterality Date   • BREAST BIOPSY Left    • COLONOSCOPY  02/12/2015    Diverticulosis in the sigmoid colon; One 5mm hyperplastic polyp in the sigmoid colon; The examination was otherwise normal on direct and retroflexion views; Repeat 5 years   • COLONOSCOPY N/A 04/11/2022    Procedure: COLONOSCOPY WITH ANESTHESIA;   Surgeon: Selena Thomas MD;  Location: [facility] ENDOSCOPY;  Service: Gastroenterology;  Laterality: N/A;  pre: hx polyps  post: divertriculosis. polyp.   Kaley Pearson MD   • ENDOSCOPY N/A 10/26/2017    Normal esophagus; A single gastric polyp-biopsied; Normal stomach; Normal examined duodenum     Family History   Problem Relation Age of Onset   • Heart failure Mother 100   • Ulcerative colitis Mother    • Cancer Mother          at 102   • Osteoporosis Mother          102   • Cancer Father         Bladder cancer   • Diabetes Brother    • Colon cancer Neg Hx    • Esophageal cancer Neg Hx    • Colon polyps Neg Hx    • Liver cancer Neg Hx    • Rectal cancer Neg Hx    • Stomach cancer Neg Hx    • Liver disease Neg Hx      Social History     Socioeconomic History   • Marital status:    Tobacco Use   • Smoking status: Never   • Smokeless tobacco: Never   Vaping Use   • Vaping Use: Never used   Substance and Sexual Activity   • Alcohol use: Yes     Alcohol/week: 1.0 standard drink     Types: 1 Glasses of wine per week     Comment: Rarely   • Drug use: Never   • Sexual activity: Not Currently     Partners: Male     Birth control/protection: Post-menopausal     Allergies   Allergen Reactions   • Declomycin [Demeclocycline] Palpitations   • Sulfa Antibiotics Itching     Current Outpatient Medications   Medication Sig Dispense Refill   • aspirin 81 MG EC tablet Take 1 tablet by mouth Daily.     • atenolol (TENORMIN) 25 MG tablet Take 2 tablets by mouth Daily. (Patient taking differently: Take 1 tablet by mouth Daily.) 90 tablet 3   • citalopram (CeleXA) 10 MG tablet TAKE 1 TABLET EVERY MORNING (Patient taking differently: Take 1 tablet by mouth Daily.) 90 tablet 0   • Restasis 0.05 % ophthalmic emulsion      • vitamin D (ERGOCALCIFEROL) 1.25 MG (45349 UT) capsule capsule Take 1 capsule by mouth Every 7 (Seven) Days. 24 capsule 3   • Lifitegrast (XIIDRA) 5 % ophthalmic solution Administer 1 drop to both eyes 2  (Two) Times a Day.       No current facility-administered medications for this visit.     Review of Systems   Constitutional: Negative for chills and fever.   HENT: Negative for congestion.    Respiratory: Negative for shortness of breath.    Cardiovascular: Negative for chest pain and leg swelling.   Gastrointestinal: Negative for constipation, diarrhea, nausea and vomiting.   Musculoskeletal: Positive for arthralgias.        Foot pain   Skin: Negative for wound.        IGTN   Neurological: Negative for numbness.       OBJECTIVE     Vitals:    04/14/23 1359   BP: 110/70   Pulse: 68   SpO2: 98%       PHYSICAL EXAM  GEN:   Accompanied by none.     Foot/Ankle Exam    GENERAL  Appearance:  appears stated age  Orientation:  AAOx3  Affect:  appropriate  Gait:  unimpaired  Assistance:  independent  Right shoe gear: casual shoe  Left shoe gear: casual shoe (toe spacer left great toe)    VASCULAR     Right Foot Vascularity   Dorsalis pedis:  2+  Posterior tibial:  2+  Skin temperature:  warm  Edema grading:  None  CFT:  3  Pedal hair growth:  Present  Varicosities:  mild varicosities     Left Foot Vascularity   Dorsalis pedis:  2+  Posterior tibial:  2+  Skin temperature:  warm  Edema grading:  None  CFT:  3  Pedal hair growth:  Present  Varicosities:  mild varicosities     NEUROLOGIC     Right Foot Neurologic   Normal sensation    Light touch sensation: normal  Vibratory sensation: normal  Hot/Cold sensation: normal  Protective Sensation using [place] Monofilament:   Sites intact: 10  Sites tested: 10     Left Foot Neurologic   Normal sensation    Light touch sensation: normal  Vibratory sensation: normal  Hot/Cold sensation:  normal  Protective Sensation using [place] Monofilament:   Sites intact: 10  Sites tested: 10    MUSCULOSKELETAL     Right Foot Musculoskeletal   Ecchymosis:  none  Tenderness:  none    Arch:  Normal  Hammertoe:  Second toe  Hallux valgus: Yes (Medial bony eminence with mild  abduction of hallux. Somewhat reducible. )       Left Foot Musculoskeletal   Ecchymosis:  none  Tenderness:  toe 1 tenderness and toenail problem  Arch:  Normal  Hammertoe:  Second toe  Hallux valgus: Yes (Medial bony eminence with mild abduction of hallux. Somewhat reducible.)      MUSCLE STRENGTH     Right Foot Muscle Strength   Foot dorsiflexion:  5  Foot plantar flexion:  5  Foot inversion:  5  Foot eversion:  5     Left Foot Muscle Strength   Foot dorsiflexion:  5  Foot plantar flexion:  5  Foot inversion:  5  Foot eversion:  5    RANGE OF MOTION     Right Foot Range of Motion   Foot and ankle ROM within normal limits       Left Foot Range of Motion   Foot and ankle ROM within normal limits      DERMATOLOGIC      Right Foot Dermatologic   Skin  Right foot skin is intact.   Nails  1.  Positive for elongated.  2.  Positive for elongated and abnormal thickness.  3.  Positive for elongated.  4.  Positive for elongated and abnormal thickness.  5.  Positive for elongated and abnormal thickness.     Left Foot Dermatologic   Skin  Left foot skin is intact. Negative for skin changes.   Nails  1.  Positive for abnormal thickness and ingrown toenail.  2.  Positive for elongated and abnormal thickness.  3.  Positive for elongated and abnormal thickness.  4.  Positive for elongated and abnormally thick.  5.  Positive for elongated and abnormally thick.      RADIOLOGY/NUCLEAR:  No results found.    LABORATORY/CULTURE RESULTS:      PATHOLOGY RESULTS:       ASSESSMENT/PLAN     Diagnoses and all orders for this visit:    1. Ingrown left greater toenail (Primary)    2. Great toe pain, left    3. Hallux valgus, left    4. Hammer toes of both feet      Comprehensive lower extremity examination and evaluation was performed.  Discussed findings and treatment plan including risks, benefits, and treatment options with patient in detail. Patient agreed with treatment plan.  After verbal consent obtained, nail(s) x1 debrided of  offending borders with nail nipper without incidence  Patient may maintain nails and calluses at home utilizing emery board or pumice stone between visits as needed   May benefit from PNA if IGTNs continue to be problematic  Remain conservative in regard to bony deformities of the feet. Discussed again that these could be contributing to recurrent issues with ingrowing toenails. Continue toe spacer.   An After Visit Summary was printed and given to the patient at discharge, including (if requested) any available informative/educational handouts regarding diagnosis, treatment, or medications. All questions were answered to patient/family satisfaction. Should symptoms fail to improve or worsen they agree to call or return to clinic or to go to the Emergency Department. Discussed the importance of following up with any needed screening tests/labs/specialist appointments and any requested follow-up recommended by me today. Importance of maintaining follow-up discussed and patient accepts that missed appointments can delay diagnosis and potentially lead to worsening of conditions.  Return if symptoms worsen or fail to improve., or sooner if acute issues arise.        This document has been electronically signed by Shorty Raymond DPM on April 14, 2023 14:24 CDT

## 2023-05-16 DIAGNOSIS — J40 BRONCHITIS: Primary | ICD-10-CM

## 2023-05-16 RX ORDER — CLINDAMYCIN HYDROCHLORIDE 300 MG/1
300 CAPSULE ORAL 3 TIMES DAILY
Qty: 21 CAPSULE | Refills: 0 | Status: SHIPPED | OUTPATIENT
Start: 2023-05-16 | End: 2023-05-23

## 2023-06-13 PROBLEM — J40 BRONCHITIS: Status: ACTIVE | Noted: 2023-06-13

## 2023-06-20 ENCOUNTER — OFFICE VISIT (OUTPATIENT)
Dept: INTERNAL MEDICINE | Age: 78
End: 2023-06-20
Payer: MEDICARE

## 2023-06-20 ENCOUNTER — TELEPHONE (OUTPATIENT)
Dept: INTERNAL MEDICINE | Age: 78
End: 2023-06-20

## 2023-06-20 ENCOUNTER — HOSPITAL ENCOUNTER (OUTPATIENT)
Dept: GENERAL RADIOLOGY | Age: 78
Discharge: HOME | End: 2023-06-20
Payer: MEDICARE

## 2023-06-20 VITALS
SYSTOLIC BLOOD PRESSURE: 150 MMHG | HEIGHT: 67 IN | WEIGHT: 214 LBS | HEART RATE: 98 BPM | BODY MASS INDEX: 33.59 KG/M2 | DIASTOLIC BLOOD PRESSURE: 90 MMHG | OXYGEN SATURATION: 94 %

## 2023-06-20 DIAGNOSIS — J20.9 ACUTE BRONCHITIS WITH BRONCHOSPASM: Primary | ICD-10-CM

## 2023-06-20 DIAGNOSIS — R06.2 WHEEZING: ICD-10-CM

## 2023-06-20 DIAGNOSIS — R05.3 PERSISTENT COUGH: ICD-10-CM

## 2023-06-20 DIAGNOSIS — R05.3 PERSISTENT COUGH: Primary | ICD-10-CM

## 2023-06-20 DIAGNOSIS — R03.0 ELEVATED BLOOD PRESSURE READING: ICD-10-CM

## 2023-06-20 PROBLEM — H04.129 TEAR FILM INSUFFICIENCY: Status: ACTIVE | Noted: 2022-07-27

## 2023-06-20 PROBLEM — J44.1 CHRONIC OBSTRUCTIVE PULMONARY DISEASE WITH ACUTE EXACERBATION (HCC): Status: ACTIVE | Noted: 2023-06-20

## 2023-06-20 PROBLEM — H40.013 OPEN ANGLE WITH BORDERLINE FINDINGS, LOW RISK, BILATERAL: Status: ACTIVE | Noted: 2022-07-27

## 2023-06-20 PROCEDURE — 71046 X-RAY EXAM CHEST 2 VIEWS: CPT

## 2023-06-20 PROCEDURE — 1090F PRES/ABSN URINE INCON ASSESS: CPT | Performed by: INTERNAL MEDICINE

## 2023-06-20 PROCEDURE — G8417 CALC BMI ABV UP PARAM F/U: HCPCS | Performed by: INTERNAL MEDICINE

## 2023-06-20 PROCEDURE — 71046 X-RAY EXAM CHEST 2 VIEWS: CPT | Performed by: RADIOLOGY

## 2023-06-20 PROCEDURE — 1036F TOBACCO NON-USER: CPT | Performed by: INTERNAL MEDICINE

## 2023-06-20 PROCEDURE — G8399 PT W/DXA RESULTS DOCUMENT: HCPCS | Performed by: INTERNAL MEDICINE

## 2023-06-20 PROCEDURE — 1123F ACP DISCUSS/DSCN MKR DOCD: CPT | Performed by: INTERNAL MEDICINE

## 2023-06-20 PROCEDURE — G8427 DOCREV CUR MEDS BY ELIG CLIN: HCPCS | Performed by: INTERNAL MEDICINE

## 2023-06-20 PROCEDURE — 99213 OFFICE O/P EST LOW 20 MIN: CPT | Performed by: INTERNAL MEDICINE

## 2023-06-20 RX ORDER — GUAIFENESIN AND CODEINE PHOSPHATE 100; 10 MG/5ML; MG/5ML
5 SOLUTION ORAL 2 TIMES DAILY PRN
Qty: 100 ML | Refills: 0 | Status: SHIPPED | OUTPATIENT
Start: 2023-06-20 | End: 2023-06-30

## 2023-06-20 NOTE — TELEPHONE ENCOUNTER
----- Message from Antonio Heaton sent at 6/20/2023  1:17 PM CDT -----  Subject: Message to Provider    QUESTIONS  Information for Provider? has seen both PA's in past 7 days for same cough   & congestion. felt better until being outside for 2 hrs (3pm-5pm on 6/19/23) . wants   to to know if she should schedule or if you will refill medications to   assist with cough and congestion.   ---------------------------------------------------------------------------  --------------  Adam RAMOS  [phone]; OK to leave message on voicemail  ---------------------------------------------------------------------------  --------------  SCRIPT ANSWERS  Relationship to Patient?  Self

## 2023-06-20 NOTE — TELEPHONE ENCOUNTER
Name of Medication? azithromycin (ZITHROMAX) 250 MG tablet   Patient-reported dosage and instructions? 250 mg 4x daily   How many days do you have left? 0   Preferred Pharmacy? [id number]   Pharmacy phone number (if available)? [phone]   ---------------------------------------------------------------------------   --------------,   Name of Medication? benzonatate (TESSALON) 100 MG capsule   Patient-reported dosage and instructions? 3 x daily   How many days do you have left? 9   Preferred Pharmacy?  [id number]   Pharmacy phone number (if available)? [phone]

## 2023-06-20 NOTE — TELEPHONE ENCOUNTER
See if she can come get a cxr pa and lateral (I put in order and then Ill see her at 4 pm today)- I can add her on today
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
## 2023-06-29 DIAGNOSIS — R26.89 LOSS OF BALANCE: Primary | ICD-10-CM

## 2023-06-29 DIAGNOSIS — R26.89 BALANCE PROBLEM: Primary | ICD-10-CM

## 2023-07-03 PROBLEM — J44.1 CHRONIC OBSTRUCTIVE PULMONARY DISEASE WITH ACUTE EXACERBATION (HCC): Status: RESOLVED | Noted: 2023-06-20 | Resolved: 2023-07-03

## 2023-07-10 ENCOUNTER — HOSPITAL ENCOUNTER (OUTPATIENT)
Dept: PHYSICAL THERAPY | Age: 78
Setting detail: THERAPIES SERIES
Discharge: HOME | End: 2023-07-10
Payer: MEDICARE

## 2023-07-10 VITALS — OXYGEN SATURATION: 94 % | SYSTOLIC BLOOD PRESSURE: 114 MMHG | DIASTOLIC BLOOD PRESSURE: 67 MMHG | HEART RATE: 63 BPM

## 2023-07-10 PROCEDURE — 97162 PT EVAL MOD COMPLEX 30 MIN: CPT

## 2023-07-10 PROCEDURE — 97530 THERAPEUTIC ACTIVITIES: CPT

## 2023-07-10 ASSESSMENT — 10 METER WALK TEST (10METWT)
AVERAGE: 8.5
TRIAL 1: TIME TO WALK 10 METERS: 8.5
SCORE (M/S): 1.18
TEST DIRECTIONS: SELF-SELECTED OR FAST-VELOCITY: SELF-SELECTED

## 2023-07-10 NOTE — PROGRESS NOTES
Physical Therapy: Initial Evaluation    Patient: Julián Lopez (64 y.o. female)   Examination Date:   Plan of Care Certification Period: 7/10/2023 to 10/08/23      :  1945 ;    Confirmed: Yes MRN: [id number]  CSN: [id number]   Insurance: Payor: MEDICARE / Plan: MEDICARE PART A AND B / Product Type: *No Product type* /   Insurance ID: [id number] - (Medicare) Secondary Insurance (if applicable): [facility]   Referring Physician: MD Palak Wise   PCP: Palak Eng MD Visits to Date/Visits Approved: 1 /      No Show/Cancelled Appts:   /       Medical Diagnosis: Other abnormalities of gait and mobility [R26.89]  Unspecified fall, initial encounter [X86. XXXA] Balance problem; Unstable gait;  Hx falling  Treatment Diagnosis:       PERTINENT MEDICAL HISTORY      Self reported health status[de-identified] Excellent    Medical History:     Past Medical History:   Diagnosis Date    Anxiety     GERD (gastroesophageal reflux disease)     Hypertension     Osteoarthritis     Sleep apnea     Vitamin D deficiency      Surgical History:   Past Surgical History:   Procedure Laterality Date    BREAST BIOPSY Right     b9    CATARACT REMOVAL         Medications:   Current Outpatient Medications:     methylPREDNISolone (MEDROL DOSEPACK) 4 MG tablet, Take by mouth., Disp: 1 kit, Rfl: 0    albuterol sulfate HFA (PROVENTIL;VENTOLIN;PROAIR) 108 (90 Base) MCG/ACT inhaler, Inhale 2 puffs into the lungs every 6 hours as needed for Wheezing, Disp: 18 g, Rfl: 3    citalopram (CELEXA) 20 MG tablet, Take 1 tablet by mouth daily, Disp: 90 tablet, Rfl: 3    RESTASIS 0.05 % ophthalmic emulsion, , Disp: , Rfl:     diclofenac sodium (VOLTAREN) 1 % GEL, Apply 4 g topically 4 times daily, Disp: 1 g, Rfl: 1    atenolol (TENORMIN) 25 MG tablet, Take 1 tablet by mouth daily, Disp: 90 tablet, Rfl: 3    ergocalciferol (ERGOCALCIFEROL) 1.25 MG (70649 UT) capsule, Take 1 capsule by mouth once a week, Disp: 12 capsule, Rfl: 3    aspirin 81 MG

## 2023-07-11 ENCOUNTER — APPOINTMENT (OUTPATIENT)
Dept: PHYSICAL THERAPY | Age: 78
End: 2023-07-11
Payer: MEDICARE

## 2023-07-11 ENCOUNTER — OFFICE VISIT (OUTPATIENT)
Dept: INTERNAL MEDICINE | Age: 78
End: 2023-07-11
Payer: MEDICARE

## 2023-07-11 ENCOUNTER — HOSPITAL ENCOUNTER (OUTPATIENT)
Dept: GENERAL RADIOLOGY | Age: 78
Discharge: HOME | End: 2023-07-11
Payer: MEDICARE

## 2023-07-11 VITALS
HEIGHT: 67 IN | WEIGHT: 213 LBS | BODY MASS INDEX: 33.43 KG/M2 | HEART RATE: 68 BPM | DIASTOLIC BLOOD PRESSURE: 70 MMHG | OXYGEN SATURATION: 94 % | SYSTOLIC BLOOD PRESSURE: 120 MMHG

## 2023-07-11 DIAGNOSIS — M25.562 CHRONIC PAIN OF BOTH KNEES: ICD-10-CM

## 2023-07-11 DIAGNOSIS — F41.9 ANXIETY: ICD-10-CM

## 2023-07-11 DIAGNOSIS — G89.29 CHRONIC PAIN OF BOTH KNEES: ICD-10-CM

## 2023-07-11 DIAGNOSIS — R26.81 UNSTEADY GAIT: ICD-10-CM

## 2023-07-11 DIAGNOSIS — M25.561 CHRONIC PAIN OF BOTH KNEES: ICD-10-CM

## 2023-07-11 DIAGNOSIS — M70.51 PES ANSERINUS BURSITIS OF BOTH KNEES: ICD-10-CM

## 2023-07-11 DIAGNOSIS — M70.52 PES ANSERINUS BURSITIS OF BOTH KNEES: ICD-10-CM
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
## 2023-07-11 DIAGNOSIS — R05.2 SUBACUTE COUGH: ICD-10-CM

## 2023-07-11 DIAGNOSIS — M54.50 ACUTE RIGHT-SIDED LOW BACK PAIN WITHOUT SCIATICA: ICD-10-CM

## 2023-07-11 PROCEDURE — 73562 X-RAY EXAM OF KNEE 3: CPT

## 2023-07-11 PROCEDURE — G8417 CALC BMI ABV UP PARAM F/U: HCPCS | Performed by: INTERNAL MEDICINE

## 2023-07-11 PROCEDURE — G8399 PT W/DXA RESULTS DOCUMENT: HCPCS | Performed by: INTERNAL MEDICINE

## 2023-07-11 PROCEDURE — 1036F TOBACCO NON-USER: CPT | Performed by: INTERNAL MEDICINE

## 2023-07-11 PROCEDURE — 72110 X-RAY EXAM L-2 SPINE 4/>VWS: CPT

## 2023-07-11 PROCEDURE — 1123F ACP DISCUSS/DSCN MKR DOCD: CPT | Performed by: INTERNAL MEDICINE

## 2023-07-11 PROCEDURE — 1090F PRES/ABSN URINE INCON ASSESS: CPT | Performed by: INTERNAL MEDICINE

## 2023-07-11 PROCEDURE — 99214 OFFICE O/P EST MOD 30 MIN: CPT | Performed by: INTERNAL MEDICINE

## 2023-07-11 PROCEDURE — G8427 DOCREV CUR MEDS BY ELIG CLIN: HCPCS | Performed by: INTERNAL MEDICINE

## 2023-07-11 PROCEDURE — 71100 X-RAY EXAM RIBS UNI 2 VIEWS: CPT

## 2023-07-11 NOTE — PROGRESS NOTES
Rate >60 >60    Calcium 9.4 8.8 - 10.2 mg/dL    Total Protein 6.8 6.6 - 8.7 g/dL    Albumin 4.4 3.5 - 5.2 g/dL    Total Bilirubin 0.6 0.2 - 1.2 mg/dL    Alkaline Phosphatase 99 35 - 104 U/L    ALT 17 5 - 33 U/L    AST 15 5 - 32 U/L   CBC   Result Value Ref Range    WBC 8.7 4.8 - 10.8 K/uL    RBC 4.73 4.20 - 5.40 M/uL    Hemoglobin 13.6 12.0 - 16.0 g/dL    Hematocrit 42.6 37.0 - 47.0 %    MCV 90.1 81.0 - 99.0 fL    MCH 28.8 27.0 - 31.0 pg    MCHC 31.9 (L) 33.0 - 37.0 g/dL    RDW 12.4 11.5 - 14.5 %    Platelets 231 379 - 275 K/uL    MPV 10.1 9.4 - 12.3 fL       ASSESSMENT/ PLAN:  1. Fall, initial encounter  We will x-ray her lower back and the right ribs posterior ribs. The back does not usually bother her some of her symptoms ever 5 muscle weakness and falling sound like they could come from her back however we will start with x-ray she is going to physical therapy. - XR LUMBAR SPINE (MIN 4 VIEWS); Future  - XR RIBS RIGHT (2 VIEWS); Future    2. Acute right-sided low back pain without sciatica  As above continue with physical therapy for balancing strengthening and help with discomfort. Strengthening of her thigh muscles also to help with bursitis  3. Chronic pain of both knees  X-rays but has been diagnosed with pes anserinus bursitis follow-up with rheumatology. We explained the treatment for this type of bursitis is weight loss thigh muscle strengthening physical therapy injection therapy we could give NSAIDs  - XR KNEE RIGHT (3 VIEWS); Future  - XR KNEE LEFT (3 VIEWS); Future    4. Subacute cough  Seems to have had some reactive airways with this recent either allergic reaction or infection she is much better does have a little upper respiratory sinus symptoms but cough pretty much resolved if recurs or worse let me know. 5. Unsteady gait  PT dulce needs strenghtening     6.  Pes anserinus bursitis of both knees  Needs physical therapy ongoing injection therapy need to rule out osteoarthritis in the knees

## 2023-07-12 DIAGNOSIS — M47.26 OSTEOARTHRITIS OF SPINE WITH RADICULOPATHY, LUMBAR REGION: Primary | ICD-10-CM

## 2023-07-12 RX ORDER — MELOXICAM 15 MG/1
15 TABLET ORAL DAILY
Qty: 30 TABLET | Refills: 0 | Status: SHIPPED | OUTPATIENT
Start: 2023-07-12

## 2023-07-13 ENCOUNTER — HOSPITAL ENCOUNTER (OUTPATIENT)
Dept: PHYSICAL THERAPY | Age: 78
Setting detail: THERAPIES SERIES
Discharge: HOME | End: 2023-07-13
Payer: MEDICARE

## 2023-07-13 PROCEDURE — 97530 THERAPEUTIC ACTIVITIES: CPT

## 2023-07-13 PROCEDURE — 97110 THERAPEUTIC EXERCISES: CPT

## 2023-07-13 ASSESSMENT — PAIN DESCRIPTION - LOCATION: LOCATION: RIB CAGE

## 2023-07-13 ASSESSMENT — PAIN DESCRIPTION - ORIENTATION: ORIENTATION: RIGHT

## 2023-07-13 ASSESSMENT — PAIN SCALES - GENERAL: PAINLEVEL_OUTOF10: 3

## 2023-07-13 NOTE — PROGRESS NOTES
Physical Therapy: Daily Note   Patient: Simone Arora (73 y.o. female)   Examination Date:   Plan of Care/Certification Expiration Date: 10/08/23    No data recorded   :  1945 # of Visits since [facility]:   2   MRN: [id number]  CSN: [id number] Start of Care Date:   7/10/2023   Insurance: Payor: MEDICARE / Plan: MEDICARE PART A AND B / Product Type: *No Product type* /   Insurance ID: [id number] - (Medicare) Secondary Insurance (if applicable):    Referring Physician: MD Natacha De   PCP: Natacha Aly MD Visits to Date/Visits Approved: 2 /      No Show/Cancelled Appts:   /       Medical Diagnosis: Other abnormalities of gait and mobility [R26.89]  Unspecified fall, initial encounter [R64. XXXA] Balance problem; Unstable gait; Hx falling  No data recorded      SUBJECTIVE EXAMINATION   Pain Level: Pain Screening  Patient Currently in Pain: Yes  Pain Assessment: 0-10  Pain Level: 3  Pain Location: Rib cage  Pain Orientation: Right    Patient Comments: Subjective: Still with right rib cage pain. HEP Compliance: Good        OBJECTIVE EXAMINATION   Restrictions:  No data recorded No data recorded No data recorded           Balance/Gait Assessment(s) Performed:   Modified CTSIB  Standing on level surface, NBOS, EO: 30 seconds  Standing on level surface, NBOS, EC: 30 seconds  Standing on Airex, EO: 30 seconds  Standing on Airex, EC: 30 seconds    Interpretation of Score: Modified CTSIB is a timed test that systematically measures the influence of visual, vestibular, and somatosensory input on standing balance. 6 Minute Walk Test   Pre-Test Vitals: Heart Rate: 66; O2 Saturation - Before walk: 93     Post-Test Vitals: Heart Rate: 83  O2 Saturation - After walk: 92     Distance: 1301 ft.; 396.55 meters    TREATMENT     Exercises:      Treatment Reasoning    Exercise 1: 6 MWT  1301  Exercise 2: Standing, hip abd, ext, mini  squat, heel raise. , 10 reps  Exercise 3: Sit to stand, 10

## 2023-07-18 ENCOUNTER — HOSPITAL ENCOUNTER (OUTPATIENT)
Dept: PHYSICAL THERAPY | Age: 78
Setting detail: THERAPIES SERIES
Discharge: HOME | End: 2023-07-18
Payer: MEDICARE

## 2023-07-18 PROCEDURE — 97110 THERAPEUTIC EXERCISES: CPT

## 2023-07-19 ENCOUNTER — HOSPITAL ENCOUNTER (OUTPATIENT)
Dept: PHYSICAL THERAPY | Age: 78
Setting detail: THERAPIES SERIES
Discharge: HOME | End: 2023-07-19
Payer: MEDICARE

## 2023-07-19 PROCEDURE — 97110 THERAPEUTIC EXERCISES: CPT

## 2023-07-19 ASSESSMENT — PAIN DESCRIPTION - LOCATION: LOCATION: KNEE

## 2023-07-19 NOTE — PROGRESS NOTES
lower trunck rotation, 10 reps  not today  Exercise 16: Sitting, leg curl, blue t band, 10 reps  Exercise 18: HEP issued 7/18/2023                          Pt Education:         ASSESSMENT     Assessment:         Post-Treatment Pain Level:      No data recorded  Therapy Prognosis: Good       GOALS   Patient Goals : To have better balance and to not fall. Short Term Goals Completed by 3-4 weeks Current Status Goal Status   Patient will improve LE strength as demonstrated by improvement in 5 Times Sit to Stand from 16 sec to 12 sec. in order to reduce frequency of falling. Patient will improve anticipatory postrual response in order to reduce fall risk as demonstrated by improvement of SLS to 30 sec. Patient will perform HEP with min cuing. Long Term Goals Completed by 6-8 weeks Current Status Goal Status   Patient will improve LE strength as demonstrated by improvement in 5 Times Sit to Stand from 16 sec to <12 sec. in order to reduce frequency of falling. Patient will improve anticipatory postrual response in order to reduce fall risk as demonstrated by improvement of SLS to >30 sec. Patient will perform HEP with no cuing.                                                                     TREATMENT PLAN   Plan Frequency: 2 X weekly X  Plan weeks: 6-8 weeks  Current Treatment Recommendations: Strengthening, ROM, Balance training, Functional mobility training, Transfer training, ADL/Self-care training, IADL training, Endurance training, Manual, Neuromuscular re-education, Stair training, Gait training, Home exercise program, Modalities, Positioning, Therapeutic activities  Modalities: Heat/Cold, E-stim - unattended           Therapy Time  Individual Time In: 1000       Individual Time Out: 1100  Minutes: 60  Timed Code Treatment Minutes: 60 Minutes     Electronically signed by Suresh Culp PT  on 7/19/2023 at 1:06 PM   POC

## 2023-07-24 ENCOUNTER — HOSPITAL ENCOUNTER (OUTPATIENT)
Dept: PHYSICAL THERAPY | Age: 78
Setting detail: THERAPIES SERIES
Discharge: HOME | End: 2023-07-24
Payer: MEDICARE

## 2023-07-24 PROCEDURE — 97110 THERAPEUTIC EXERCISES: CPT

## 2023-07-24 ASSESSMENT — PAIN DESCRIPTION - LOCATION: LOCATION: KNEE

## 2023-07-24 ASSESSMENT — PAIN SCALES - GENERAL: PAINLEVEL_OUTOF10: 4

## 2023-07-24 ASSESSMENT — PAIN DESCRIPTION - ORIENTATION: ORIENTATION: RIGHT;LEFT

## 2023-07-24 NOTE — PROGRESS NOTES
Physical Therapy: Daily Note   Patient: Dipika Bell (19 y.o. female)   Examination Date:   Plan of Care/Certification Expiration Date: 10/08/23    No data recorded   :  1945 # of Visits since [facility]:   5   MRN: [id number]  CSN: [id number] Start of Care Date:   7/10/2023   Insurance: Payor: MEDICARE / Plan: MEDICARE PART A AND B / Product Type: *No Product type* /   Insurance ID: [id number] - (Medicare) Secondary Insurance (if applicable):    Referring Physician: MD Ciara Renteria   PCP: Ciara Dey MD Visits to Date/Visits Approved: 5 /      No Show/Cancelled Appts:   /       Medical Diagnosis: Other abnormalities of gait and mobility [R26.89]  Unspecified fall, initial encounter [J63. XXXA] Balance problem; Unstable gait; Hx falling  No data recorded      SUBJECTIVE EXAMINATION   Pain Level: Pain Screening  Patient Currently in Pain: Denies  Pain Level: 4  Pain Location: Knee  Pain Orientation: Right, Left    Patient Comments: Subjective: She rates bilat knee pain. \"I did more walking this weekend\". HEP Compliance: Good        OBJECTIVE EXAMINATION   Restrictions:  No data recorded No data recorded No data recorded                TREATMENT     Exercises:      Treatment Reasoning    Exercise 1: LBE 10 min, level 2, Dist: 1.4  Exercise 2: Standing, hip abd, ext, mini  squat, heel raise. , 10 reps  Exercise 3: Sit to stand, 10 reps  Exercise 4: //, standing feet together, EC, 30 sec X 2; stride stance, EO, EC, 30 sec each, X2  Exercise 5: //, step over hurdles, forward, side to side, 1 min each  not today  Exercise 6: //, toe touch cone, 10 reps each  Exercise 7: Standing in corner, ballon tap, feet together, semi-tandem stance, 3 min  each  not today  Exercise 8: Supine, SLR, hip abd, 10 reps  Exercise 9: Supine, bridges, 10 reps  Exercise 10: Standing, box steps, CW, CCW, 5 reps each;  Box steps with ballon tap, 1 min  not today  Exercise 11: Sit to stand, 10 reps  Exercise 12:

## 2023-07-26 ENCOUNTER — HOSPITAL ENCOUNTER (OUTPATIENT)
Dept: PHYSICAL THERAPY | Age: 78
Setting detail: THERAPIES SERIES
Discharge: HOME | End: 2023-07-26
Payer: MEDICARE

## 2023-07-26 PROCEDURE — 97110 THERAPEUTIC EXERCISES: CPT

## 2023-07-26 ASSESSMENT — PAIN SCALES - GENERAL: PAINLEVEL_OUTOF10: 1

## 2023-07-26 NOTE — PROGRESS NOTES
Physical Therapy: Daily Note   Patient: Melo Villar (89 y.o. female)   Examination Date:   Plan of Care/Certification Expiration Date: 10/08/23    No data recorded   :  1945 # of Visits since [facility]:   6   MRN: [id number]  CSN: [id number] Start of Care Date:   7/10/2023   Insurance: Payor: MEDICARE / Plan: MEDICARE PART A AND B / Product Type: *No Product type* /   Insurance ID: [id number] - (Medicare) Secondary Insurance (if applicable):    Referring Physician: MD Nasra Bey   PCP: Nasra Choi MD Visits to Date/Visits Approved: 6 /      No Show/Cancelled Appts:   /       Medical Diagnosis: Other abnormalities of gait and mobility [R26.89]  Unspecified fall, initial encounter [K49. XXXA] Balance problem; Unstable gait; Hx falling  No data recorded      SUBJECTIVE EXAMINATION   Pain Level: Pain Screening  Patient Currently in Pain: Yes  Pain Assessment: 0-10  Pain Level: 1    Patient Comments: Subjective: She rates pain at 1/10. HEP Compliance: Good        OBJECTIVE EXAMINATION   Restrictions:  No data recorded No data recorded No data recorded                TREATMENT     Exercises:      Treatment Reasoning    Exercise 1: LBE 10 min, level 2, Dist: 1.67  Exercise 2: Standing, hip abd, ext, mini  squat, heel raise. , 10 reps  Exercise 3: Sit to stand, 10 reps  Exercise 4: //, standing feet together, EC, 30 sec X 2; stride stance, EO, EC, 30 sec each, X2  Exercise 5: //, step over hurdles, forward, side to side, 1 min each  Exercise 6: //, toe touch cone, 10 reps each  not today  Exercise 7: Standing i, ballon tap, feet together, semi-tandem stance, 3 min  each  Exercise 8: Supine, SLR, 3#, hip abd, 3#,10 reps  Exercise 9: Supine, bridges, 10 reps  Exercise 11: Sit to stand, 15 reps  Exercise 12: Supine, shoulder flex, 10 reps  Exercise 14: Supine, lower trunck rotation, 10 reps  not today  Exercise 15: LBE, 5-10 min not today  Exercise 16: Sitting, leg curl, blue t band, 10

## 2023-07-31 ENCOUNTER — HOSPITAL ENCOUNTER (OUTPATIENT)
Dept: PHYSICAL THERAPY | Age: 78
Setting detail: THERAPIES SERIES
Discharge: HOME | End: 2023-07-31
Payer: MEDICARE

## 2023-07-31 PROCEDURE — 97110 THERAPEUTIC EXERCISES: CPT

## 2023-07-31 NOTE — PROGRESS NOTES
Supine, lower trunck rotation, 10 reps  Exercise 15: LBE, 5-10 min not today  Exercise 16: Sitting, leg curl, blue t band, 10 reps  not today  Exercise 17: Fitter, //, 10 foward/backwad; 10 sise-sided. not today  Exercise 18: Trampline, 1 min march; 10 jumps on trampoline                          Pt Education:         ASSESSMENT     Assessment: Assessment: She performs ther ex as per flow sheet. She tolerates increasing activity well. Body Structures, Functions, Activity Limitations Requiring Skilled Therapeutic Intervention: Decreased ADL status, Decreased strength, Decreased high-level IADLs, Decreased balance    Post-Treatment Pain Level:      No data recorded  Therapy Prognosis: Good       GOALS   Patient Goals : To have better balance and to not fall. Short Term Goals Completed by 3-4 weeks Current Status Goal Status   Patient will improve LE strength as demonstrated by improvement in 5 Times Sit to Stand from 16 sec to 12 sec. in order to reduce frequency of falling. Patient will improve anticipatory postrual response in order to reduce fall risk as demonstrated by improvement of SLS to 30 sec. Patient will perform HEP with min cuing. Long Term Goals Completed by 6-8 weeks Current Status Goal Status   Patient will improve LE strength as demonstrated by improvement in 5 Times Sit to Stand from 16 sec to <12 sec. in order to reduce frequency of falling. Patient will improve anticipatory postrual response in order to reduce fall risk as demonstrated by improvement of SLS to >30 sec. Patient will perform HEP with no cuing.                                                                     TREATMENT PLAN   Plan Frequency: 2 X weekly X  Plan weeks: 6-8 weeks  Current Treatment Recommendations: Strengthening, ROM, Balance training, Functional mobility training, Transfer training, ADL/Self-care training, IADL training,

## 2023-08-02 ENCOUNTER — HOSPITAL ENCOUNTER (OUTPATIENT)
Dept: PHYSICAL THERAPY | Age: 78
Setting detail: THERAPIES SERIES
Discharge: HOME | End: 2023-08-02
Payer: MEDICARE

## 2023-08-02 PROCEDURE — 97110 THERAPEUTIC EXERCISES: CPT

## 2023-08-02 ASSESSMENT — PAIN DESCRIPTION - ORIENTATION: ORIENTATION: RIGHT

## 2023-08-02 ASSESSMENT — PAIN DESCRIPTION - LOCATION: LOCATION: SHOULDER

## 2023-08-02 ASSESSMENT — PAIN SCALES - GENERAL: PAINLEVEL_OUTOF10: 4

## 2023-08-02 NOTE — PROGRESS NOTES
Physical Therapy: Daily Note   Patient: Cedric Balderas (46 y.o. female)   Examination Date:   Plan of Care/Certification Expiration Date: 10/08/23    No data recorded   :  1945 # of Visits since [facility]:   8   MRN: [id number]  CSN: [id number] Start of Care Date:   7/10/2023   Insurance: Payor: MEDICARE / Plan: MEDICARE PART A AND B / Product Type: *No Product type* /   Insurance ID: [id number] - (Medicare) Secondary Insurance (if applicable):    Referring Physician: MD Alisia Segal   PCP: Alisia Murguia MD Visits to Date/Visits Approved:  Show/Cancelled Appts:   /       Medical Diagnosis: Other abnormalities of gait and mobility [R26.89]  Unspecified fall, initial encounter [Z58. XXXA] Balance problem; Unstable gait; Hx falling  No data recorded      SUBJECTIVE EXAMINATION   Pain Level: Pain Screening  Patient Currently in Pain: Yes  Pain Level: 4  Pain Location: Shoulder  Pain Orientation: Right    Patient Comments: Subjective: She has right shoulder pain today. She relates it to computer work while sitting on sofa. Pain rating is 4/10.     HEP Compliance: Good        OBJECTIVE EXAMINATION   Restrictions:  No data recorded No data recorded No data recorded      Left AROM  Right AROM      AROM LUE (degrees)  L Shoulder Flexion (0-180): 180 active standing  L Shoulder ABduction (0-180): 180 active standing AROM RUE (degrees)  R Shoulder Flexion (0-180): 180 active standing  R Shoulder ABduction (0-180): 180 active standing       Left PROM  Right PROM      PROM LUE (degrees)  L Shoulder Flex  (0-180): 180  L Shoulder ABduction (0-180): 180  L Shoulder Int Rotation  (0-70): 90  L Shoulder Ext Rotation  (0-90): 90 PROM RUE (degrees)  R Shoulder Flex  (0-180): 170 active supine  R Shoulder ABduction (0-180): 132 active supine  R Shoulder Int Rotation  (0-70): 60 active supine  R Shoulder Ext Rotation  (0-90): 90 active supine       Left Strength  Right Strength         Strength

## 2023-08-07 ENCOUNTER — HOSPITAL ENCOUNTER (OUTPATIENT)
Dept: PHYSICAL THERAPY | Age: 78
Setting detail: THERAPIES SERIES
Discharge: HOME | End: 2023-08-07
Payer: MEDICARE

## 2023-08-07 PROCEDURE — 97110 THERAPEUTIC EXERCISES: CPT

## 2023-08-07 ASSESSMENT — PAIN DESCRIPTION - ORIENTATION: ORIENTATION: RIGHT

## 2023-08-07 ASSESSMENT — PAIN SCALES - GENERAL: PAINLEVEL_OUTOF10: 0

## 2023-08-07 ASSESSMENT — PAIN DESCRIPTION - LOCATION: LOCATION: SHOULDER

## 2023-08-07 NOTE — PROGRESS NOTES
Physical Therapy: Daily Note   Patient: Adolfo Bowen (18 y.o. female)   Examination Date:   Plan of Care/Certification Expiration Date: 10/08/23    No data recorded   :  1945 # of Visits since [facility]:   9   MRN: [id number]  CSN: [id number] Start of Care Date:   7/10/2023   Insurance: Payor: MEDICARE / Plan: MEDICARE PART A AND B / Product Type: *No Product type* /   Insurance ID: [id number] - (Medicare) Secondary Insurance (if applicable):    Referring Physician: MD Deng Gonzales   PCP: Deng Forrest MD Visits to Date/Visits Approved:  Show/Cancelled Appts:   /       Medical Diagnosis: Other abnormalities of gait and mobility [R26.89]  Unspecified fall, initial encounter [V29. XXXA] Balance problem; Unstable gait; Hx falling  No data recorded      SUBJECTIVE EXAMINATION   Pain Level: Pain Screening  Patient Currently in Pain: Denies  Pain Assessment: 0-10  Pain Level: 0  Pain Location: Shoulder  Pain Orientation: Right    Patient Comments: Subjective: She has right shoulder pain. She has trouble sleeping on right shoulder. She has no shoulder pain at present. HEP Compliance: Good        OBJECTIVE EXAMINATION   Restrictions:  No data recorded No data recorded No data recorded                TREATMENT     Exercises:      Treatment Reasoning    Exercise 1: LBE 10 min, level 2.5, Dist: 1.70  Exercise 2: Standing, hip abd, ext, mini  squat, heel raise. , 10 reps  Exercise 3: Sit to stand, 10 reps  Exercise 4: //, standing feet together, EC, 30 sec X 2; stride stance, EO, EC, 30 sec each, X2  Exercise 5: //, step over hurdles, forward, side to side, 1 min each  Exercise 6: //, toe touch cone, 10 reps each  not today  Exercise 7: Standing i, ballon tap, feet together, semi-tandem stance, 3 min  each  Exercise 8: Supine, SLR, 4#, hip abd, 4#,10 reps  Exercise 9: Supine, bridges, 10 reps  Exercise 10: Standing, box steps, CW, CCW, 5 reps each;  Box steps with ballon tap, 1 min  not

## 2023-08-09 ENCOUNTER — HOSPITAL ENCOUNTER (OUTPATIENT)
Dept: PHYSICAL THERAPY | Age: 78
Setting detail: THERAPIES SERIES
Discharge: HOME | End: 2023-08-09
Payer: MEDICARE

## 2023-08-09 PROCEDURE — 97110 THERAPEUTIC EXERCISES: CPT

## 2023-08-09 ASSESSMENT — 10 METER WALK TEST (10METWT)
SCORE (M/S): 1.3
TRIAL 1: TIME TO WALK 10 METERS: 7.69
TEST DIRECTIONS: SELF-SELECTED OR FAST-VELOCITY: SELF
AVERAGE: 7.7

## 2023-08-09 ASSESSMENT — PAIN DESCRIPTION - LOCATION: LOCATION: SHOULDER

## 2023-08-09 ASSESSMENT — PAIN DESCRIPTION - ORIENTATION: ORIENTATION: RIGHT

## 2023-08-09 NOTE — PROGRESS NOTES
Physical Therapy: Daily Note/Discharge Summary   Patient: Ilene Bolaños (80 y.o. female)   Examination Date:   Plan of Care/Certification Expiration Date: 10/08/23    No data recorded   :  1945 # of Visits since [facility]:   10   MRN: [id number]  CSN: [id number] Start of Care Date:   7/10/2023   Insurance: Payor: MEDICARE / Plan: MEDICARE PART A AND B / Product Type: *No Product type* /   Insurance ID: [id number] - (Medicare) Secondary Insurance (if applicable):    Referring Physician: MD Randell Crisostomo   PCP: Randell Jeronimo MD Visits to Date/Visits Approved: 10 /      No Show/Cancelled Appts:   /       Medical Diagnosis: Other abnormalities of gait and mobility [R26.89]  Unspecified fall, initial encounter [L75. XXXA] Balance problem; Unstable gait; Hx falling  No data recorded      SUBJECTIVE EXAMINATION   Pain Level: Pain Screening  Patient Currently in Pain: Denies  Pain Assessment: 0-10  Best Pain Level: 0  Worst Pain Level: 8  Pain Location: Shoulder  Pain Orientation: Right    Patient Comments: Subjective: She relates intermittent right shoulder and bilateral knee pain. HEP Compliance: Good        OBJECTIVE EXAMINATION   Restrictions:  No data recorded No data recorded No data recorded      Left PROM  Right PROM      PROM LUE (degrees)  L Shoulder Flex  (0-180): 180  L Shoulder ABduction (0-180): 180  L Shoulder Int Rotation  (0-70): 90  L Shoulder Ext Rotation  (0-90): 90 PROM RUE (degrees)  R Shoulder Flex  (0-180): 160 active supine  R Shoulder ABduction (0-180): 135 active supine  R Shoulder Int Rotation  (0-70): 21 active supine  R Shoulder Ext Rotation  (0-90): 90 active supine       Balance/Gait Assessment(s) Performed:   Jaime Balance Scale   1. Sitting to Standing: Able to stand without using hands and stabilize independently  2. Standing Unsupported: Able to stand safely for 2 minutes  3.  Sitting with Back Unsupported but Feet Supported on Floor or on a Stool: Able to sit

## 2023-08-18 ENCOUNTER — TELEPHONE (OUTPATIENT)
Dept: INTERNAL MEDICINE | Age: 78
End: 2023-08-18

## 2023-08-18 DIAGNOSIS — J01.10 ACUTE NON-RECURRENT FRONTAL SINUSITIS: Primary | ICD-10-CM

## 2023-08-18 RX ORDER — AZITHROMYCIN 250 MG/1
250 TABLET, FILM COATED ORAL SEE ADMIN INSTRUCTIONS
Qty: 6 TABLET | Refills: 0 | Status: SHIPPED | OUTPATIENT
Start: 2023-08-18 | End: 2023-08-23

## 2023-09-06 ENCOUNTER — OFFICE VISIT (OUTPATIENT)
Dept: INTERNAL MEDICINE | Age: 78
End: 2023-09-06
Payer: MEDICARE

## 2023-09-06 VITALS
SYSTOLIC BLOOD PRESSURE: 124 MMHG | DIASTOLIC BLOOD PRESSURE: 70 MMHG | OXYGEN SATURATION: 94 % | HEART RATE: 67 BPM | HEIGHT: 67 IN | WEIGHT: 215.6 LBS | RESPIRATION RATE: 20 BRPM | BODY MASS INDEX: 33.84 KG/M2

## 2023-09-06 DIAGNOSIS — H60.501 ACUTE OTITIS EXTERNA OF RIGHT EAR, UNSPECIFIED TYPE: Primary | ICD-10-CM

## 2023-09-06 PROCEDURE — 4130F TOPICAL PREP RX AOE: CPT | Performed by: NURSE PRACTITIONER

## 2023-09-06 PROCEDURE — G8399 PT W/DXA RESULTS DOCUMENT: HCPCS | Performed by: NURSE PRACTITIONER

## 2023-09-06 PROCEDURE — G8417 CALC BMI ABV UP PARAM F/U: HCPCS | Performed by: NURSE PRACTITIONER

## 2023-09-06 PROCEDURE — G8427 DOCREV CUR MEDS BY ELIG CLIN: HCPCS | Performed by: NURSE PRACTITIONER

## 2023-09-06 PROCEDURE — 1090F PRES/ABSN URINE INCON ASSESS: CPT | Performed by: NURSE PRACTITIONER

## 2023-09-06 PROCEDURE — 99213 OFFICE O/P EST LOW 20 MIN: CPT | Performed by: NURSE PRACTITIONER

## 2023-09-06 PROCEDURE — 1036F TOBACCO NON-USER: CPT | Performed by: NURSE PRACTITIONER

## 2023-09-06 PROCEDURE — 1123F ACP DISCUSS/DSCN MKR DOCD: CPT | Performed by: NURSE PRACTITIONER

## 2023-09-06 RX ORDER — CIPROFLOXACIN AND DEXAMETHASONE 3; 1 MG/ML; MG/ML
4 SUSPENSION/ DROPS AURICULAR (OTIC) 2 TIMES DAILY
Qty: 7.5 ML | Refills: 0 | Status: SHIPPED | OUTPATIENT
Start: 2023-09-06 | End: 2023-09-13

## 2023-09-06 NOTE — PROGRESS NOTES
[facility] INTERNAL MEDICINE  [facility],[address]  [address] [address]  Dept: [phone]  Dept Fax: [phone]  Loc: [phone]    Brenda Rod is a 70 y.o. female who presents today for her medical conditions/complaintsas noted below. Brenda Rod is c/o of Otalgia (Same Day Visit /Rt Inner Ear Pain x3 Days )        HPI:       Colten Lora presents today with right ear pain. The start about 3 days ago. She was putting some new earbuds into her ear to help with it. When this pain started worse at night pain radiating outside the ear into the jaw on the front side of the ear canal.  Mostly around 1 o'clock position. She has not had fever or chills. There is no drainage. Feels like the ear is stopped up.   Past Medical History:   Diagnosis Date    Anxiety     GERD (gastroesophageal reflux disease)     Hypertension     Osteoarthritis     Sleep apnea     Vitamin D deficiency      Past Surgical History:   Procedure Laterality Date    BREAST BIOPSY Right 1980    b9    CATARACT REMOVAL         Family History   Problem Relation Age of Onset    Heart Disease Mother     Cancer Father         bladder    Diabetes Brother     Breast Cancer Maternal Aunt        Social History     Tobacco Use    Smoking status: Never    Smokeless tobacco: Never   Substance Use Topics    Alcohol use: Never      Current Outpatient Medications   Medication Sig Dispense Refill    ciprofloxacin-dexamethasone (CIPRODEX) 0.3-0.1 % otic suspension Place 4 drops into the right ear 2 times daily for 7 days 7.5 mL 0    meloxicam (MOBIC) 15 MG tablet Take 1 tablet by mouth daily With food 30 tablet 0    albuterol sulfate HFA (PROVENTIL;VENTOLIN;PROAIR) 108 (90 Base) MCG/ACT inhaler Inhale 2 puffs into the lungs every 6 hours as needed for Wheezing 18 g 3    citalopram (CELEXA) 20 MG tablet Take 1 tablet by mouth daily 90 tablet 3    RESTASIS 0.05 % ophthalmic emulsion       diclofenac sodium (VOLTAREN) 1 % GEL

## 2023-09-25 ENCOUNTER — TELEPHONE (OUTPATIENT)
Dept: GASTROENTEROLOGY | Facility: CLINIC | Age: 78
End: 2023-09-25

## 2023-09-25 NOTE — TELEPHONE ENCOUNTER
"  Caller: Holli Iyer \"KYLEE Iyer\"    Relationship to patient: Self    Best call back number: [phone]     Chief complaint: PATIENT WOULD LIKE TO BE SCHEDULED FOR A FOLLOW UP APPOINTMENT WITH DR. DOE.  PATIENT'S IS ASKING TO BE SCHEDULED WITH HER INSTEAD OF DAVID VILLANUEVA.    Type of visit: FOLLOW UP     Requested date: FIRST AVAILABLE      If rescheduling, when is the original appointment: NA     Additional notes:  PATIENT IS EXPERIENCING ESOPHAGEAL SPASMS (6X LAST WEEK).         "

## 2023-10-06 ENCOUNTER — HOSPITAL ENCOUNTER (OUTPATIENT)
Dept: VASCULAR LAB | Age: 78
Discharge: HOME | End: 2023-10-06
Attending: INTERNAL MEDICINE
Payer: MEDICARE

## 2023-10-06 ENCOUNTER — OFFICE VISIT (OUTPATIENT)
Dept: INTERNAL MEDICINE | Age: 78
End: 2023-10-06

## 2023-10-06 VITALS
HEIGHT: 67 IN | SYSTOLIC BLOOD PRESSURE: 140 MMHG | OXYGEN SATURATION: 95 % | DIASTOLIC BLOOD PRESSURE: 78 MMHG | BODY MASS INDEX: 34.84 KG/M2 | HEART RATE: 70 BPM | WEIGHT: 222 LBS

## 2023-10-06 DIAGNOSIS — I10 PRIMARY HYPERTENSION: ICD-10-CM

## 2023-10-06 DIAGNOSIS — R60.0 LEG EDEMA, LEFT: ICD-10-CM

## 2023-10-06 DIAGNOSIS — R60.0 LOCALIZED EDEMA: Primary | ICD-10-CM

## 2023-10-06 LAB
ALBUMIN SERPL-MCNC: 4.3 G/DL (ref 3.5–5.2)
ALP SERPL-CCNC: 90 U/L (ref 35–104)
ALT SERPL-CCNC: 21 U/L (ref 5–33)
ANION GAP SERPL CALCULATED.3IONS-SCNC: 11 MMOL/L (ref 7–19)
AST SERPL-CCNC: 18 U/L (ref 5–32)
BILIRUB SERPL-MCNC: 0.6 MG/DL (ref 0.2–1.2)
BNP BLD-MCNC: 179 PG/ML (ref 0–449)
BUN SERPL-MCNC: 13 MG/DL (ref 8–23)
CALCIUM SERPL-MCNC: 9 MG/DL (ref 8.8–10.2)
CHLORIDE SERPL-SCNC: 106 MMOL/L (ref 98–111)
CO2 SERPL-SCNC: 27 MMOL/L (ref 22–29)
CREAT SERPL-MCNC: 0.6 MG/DL (ref 0.5–0.9)
ERYTHROCYTE [DISTWIDTH] IN BLOOD BY AUTOMATED COUNT: 12.9 % (ref 11.5–14.5)
GLUCOSE SERPL-MCNC: 90 MG/DL (ref 74–109)
HCT VFR BLD AUTO: 40.1 % (ref 37–47)
HGB BLD-MCNC: 12.8 G/DL (ref 12–16)
MCH RBC QN AUTO: 29 PG (ref 27–31)
MCHC RBC AUTO-ENTMCNC: 31.9 G/DL (ref 33–37)
MCV RBC AUTO: 90.7 FL (ref 81–99)
PLATELET # BLD AUTO: 226 K/UL (ref 130–400)
PMV BLD AUTO: 10.7 FL (ref 9.4–12.3)
POTASSIUM SERPL-SCNC: 4 MMOL/L (ref 3.5–5)
PROT SERPL-MCNC: 6.8 G/DL (ref 6.6–8.7)
RBC # BLD AUTO: 4.42 M/UL (ref 4.2–5.4)
SODIUM SERPL-SCNC: 144 MMOL/L (ref 136–145)
TSH SERPL DL<=0.005 MIU/L-ACNC: 2.01 UIU/ML (ref 0.27–4.2)
WBC # BLD AUTO: 6.9 K/UL (ref 4.8–10.8)

## 2023-10-06 PROCEDURE — 93971 EXTREMITY STUDY: CPT

## 2023-10-06 RX ORDER — TRIAMTERENE AND HYDROCHLOROTHIAZIDE 37.5; 25 MG/1; MG/1
1 CAPSULE ORAL DAILY
Qty: 30 CAPSULE | Refills: 3 | Status: SHIPPED | OUTPATIENT
Start: 2023-10-06

## 2023-10-06 RX ORDER — CLONIDINE HYDROCHLORIDE 0.1 MG/1
0.1 TABLET ORAL ONCE
Status: COMPLETED | OUTPATIENT
Start: 2023-10-06 | End: 2023-10-06

## 2023-10-06 RX ADMIN — CLONIDINE HYDROCHLORIDE 0.1 MG: 0.1 TABLET ORAL at 13:54

## 2023-10-06 NOTE — PROGRESS NOTES
Hemoglobin 13.6 12.0 - 16.0 g/dL    Hematocrit 42.6 37.0 - 47.0 %    MCV 90.1 81.0 - 99.0 fL    MCH 28.8 27.0 - 31.0 pg    MCHC 31.9 (L) 33.0 - 37.0 g/dL    RDW 12.4 11.5 - 14.5 %    Platelets 474 013 - 693 K/uL    MPV 10.1 9.4 - 12.3 fL       ASSESSMENT/ PLAN:  1. Localized edema  Patient with elevated blood pressure today quite elevated we gave her clonidine and got it to come down some we are adding Dyazide if her blood pressure is above 140/90 later this afternoon go ahead and start the Dyazide this afternoon and then take every a.m. I also linked in the [address] to help with hypertension and talked about decreasing salt intake she has a history of mitral valve prolapse is pretty edematous we will get a get a 2D echo add Dyazide proceed from there  - triamterene-hydroCHLOROthiazide (DYAZIDE) 37.5-25 MG per capsule; Take 1 capsule by mouth daily  Dispense: 30 capsule; Refill: 3  - ECHO 2D WO Color Doppler Complete; Future    2. Primary hypertension  The above we explained to her that the Dyazide could cause some increased urination at first we see her back we will determine if we need to recheck electrolytes. We gave her clonidine today and explained it could make her sleepy and/or a dry mouth.  - CBC; Future  - Comprehensive Metabolic Panel; Future  - Brain Natriuretic Peptide; Future  - TSH; Future  - triamterene-hydroCHLOROthiazide (DYAZIDE) 37.5-25 MG per capsule; Take 1 capsule by mouth daily  Dispense: 30 capsule; Refill: 3  - ECHO 2D WO Color Doppler Complete; Future  - cloNIDine (CATAPRES) tablet 0.1 mg    3. Leg edema, left  Doppler will be done today to rule out a left lower extremity DVT or ruptured Baker's cyst in addition to the other problems.  - US DUP LOWER EXTREMITY LEFT JAMA;  Future

## 2023-10-12 ENCOUNTER — HOSPITAL ENCOUNTER (OUTPATIENT)
Dept: NON INVASIVE DIAGNOSTICS | Age: 78
Discharge: HOME | End: 2023-10-12
Payer: MEDICARE

## 2023-10-12 ENCOUNTER — OFFICE VISIT (OUTPATIENT)
Dept: GASTROENTEROLOGY | Facility: CLINIC | Age: 78
End: 2023-10-12
Payer: MEDICARE

## 2023-10-12 VITALS
SYSTOLIC BLOOD PRESSURE: 118 MMHG | WEIGHT: 212 LBS | DIASTOLIC BLOOD PRESSURE: 70 MMHG | HEIGHT: 67 IN | BODY MASS INDEX: 33.27 KG/M2 | TEMPERATURE: 96.8 F | OXYGEN SATURATION: 95 % | HEART RATE: 84 BPM

## 2023-10-12 DIAGNOSIS — K90.41 GLUTEN INTOLERANCE: ICD-10-CM

## 2023-10-12 DIAGNOSIS — K22.4 ESOPHAGEAL SPASM: ICD-10-CM

## 2023-10-12 DIAGNOSIS — Z12.11 COLON CANCER SCREENING: ICD-10-CM

## 2023-10-12 DIAGNOSIS — K21.9 GASTROESOPHAGEAL REFLUX DISEASE WITHOUT ESOPHAGITIS: Primary | ICD-10-CM

## 2023-10-12 DIAGNOSIS — K62.1 HYPERPLASTIC RECTAL POLYP: ICD-10-CM

## 2023-10-12 DIAGNOSIS — R60.0 LOCALIZED EDEMA: ICD-10-CM

## 2023-10-12 DIAGNOSIS — I10 PRIMARY HYPERTENSION: ICD-10-CM

## 2023-10-12 PROCEDURE — 1159F MED LIST DOCD IN RCRD: CPT | Performed by: INTERNAL MEDICINE

## 2023-10-12 PROCEDURE — C8929 TTE W OR WO FOL WCON,DOPPLER: HCPCS

## 2023-10-12 PROCEDURE — 3074F SYST BP LT 130 MM HG: CPT | Performed by: INTERNAL MEDICINE

## 2023-10-12 PROCEDURE — 3078F DIAST BP <80 MM HG: CPT | Performed by: INTERNAL MEDICINE

## 2023-10-12 PROCEDURE — 6360000004 HC RX CONTRAST MEDICATION: Performed by: INTERNAL MEDICINE

## 2023-10-12 PROCEDURE — 1160F RVW MEDS BY RX/DR IN RCRD: CPT | Performed by: INTERNAL MEDICINE

## 2023-10-12 PROCEDURE — 99214 OFFICE O/P EST MOD 30 MIN: CPT | Performed by: INTERNAL MEDICINE

## 2023-10-12 RX ORDER — TRIAMTERENE AND HYDROCHLOROTHIAZIDE 37.5; 25 MG/1; MG/1
1 CAPSULE ORAL DAILY
COMMUNITY
Start: 2023-10-06

## 2023-10-12 RX ADMIN — PERFLUTREN 1.5 ML: 6.52 INJECTION, SUSPENSION INTRAVENOUS at 13:14

## 2023-10-12 NOTE — LETTER
"October 12, 2023       No Recipients    Patient: Holli Iyer   YOB: 1945   Date of Visit: 10/12/2023     Dear Kaley Pearson MD:       Thank you for referring Holli Iyer to me for evaluation. Below are the relevant portions of my assessment and plan of care.    If you have questions, please do not hesitate to call me. I look forward to following Holli along with you.         Sincerely,        Selena Thomas MD        CC:   No Recipients    Selena Thomas MD  10/12/23 1503  Sign when Signing Visit  Primary Physician: Kaley Pearson MD    Chief Complaint   Patient presents with    Follow-up     Pt states has chronic issues with esophageal spasms-states normally it only happens a couple times a year but 2 weeks ago she had a spell where she had 6 spasms in a week; Pt's last endo was 10/26/2017       Subjective    Holli Iyer is a 78 y.o. female.    HPI  \"Esophageal spasms\"  She describes spasms that start in her back under the right shoulder blade.  GBIS.  No nausea.  They have been going on for years intermittently.  They became more frequently recently.  They resolve with sips of water and lasts for about a minute at most.  She descibes that it gets worse if she \"sits crooked.\"  No dysphagia.  No melena.  She just had a cardiac echo done today at [facility].  No heartburn/regurgitation    GERD  Endoscopy 10/2017 did not show Stokes's esophagus or esophagitis.  Currently on no antisecretory therapy for acid.  She eats a \"fourth of an apple\" and that helps her. She has gained weight.     \"Gluten sensitivity\"  No small bowel biopsies or celiac labs available for review to show gluten enteropathy.  This was a self made diagnosis in the past, but not currently an active problem because I \"eat it all the time\".  This is not a concern of hers today.    Colon cancer screening  No adenomas 4/2022.  Has history of left-sided hyperplastic polyps which does not increase colon cancer risk.  Repeat colonoscopy 4/2032 " pending overall medical health.      Past Medical History:   Diagnosis Date    Anemia     As a child    Asthma     Bronchial asthma    Diverticulosis     GERD (gastroesophageal reflux disease)     History of colon polyps     Hyperthyroidism     Ingrown toenail     Insomnia     Irregular heart beat     MVP (mitral valve prolapse)     Joya splints     In 40's    Sleep apnea     Stress fracture     Stroke     TIA in 1990s    TIA (transient ischemic attack)        Past Surgical History:   Procedure Laterality Date    BREAST BIOPSY Left     COLONOSCOPY  02/12/2015    Diverticulosis in the sigmoid colon; One 5mm hyperplastic polyp in the sigmoid colon; The examination was otherwise normal on direct and retroflexion views; Repeat 5 years    COLONOSCOPY N/A 04/11/2022    Diverticulosis in the left colon; One 4mm hyperplastic polyp in the rectum; The examination was otherwise normal on direct and retroflexion views; Repeat 10 years    ENDOSCOPY N/A 10/26/2017    Normal esophagus; A single gastric polyp-biopsied; Normal stomach; Normal examined duodenum        Current Outpatient Medications:     aspirin 81 MG EC tablet, Take 1 tablet by mouth Daily., Disp: , Rfl:     atenolol (TENORMIN) 25 MG tablet, Take 2 tablets by mouth Daily. (Patient taking differently: Take 1 tablet by mouth Daily.), Disp: 90 tablet, Rfl: 3    citalopram (CeleXA) 10 MG tablet, TAKE 1 TABLET EVERY MORNING (Patient taking differently: Take 1 tablet by mouth Daily.), Disp: 90 tablet, Rfl: 0    triamterene-hydrochlorothiazide (DYAZIDE) 37.5-25 MG per capsule, Take 1 capsule by mouth Daily., Disp: , Rfl:     vitamin D (ERGOCALCIFEROL) 1.25 MG (99799 UT) capsule capsule, Take 1 capsule by mouth Every 7 (Seven) Days., Disp: 24 capsule, Rfl: 3    Allergies   Allergen Reactions    Declomycin [Demeclocycline] Palpitations    Sulfa Antibiotics Itching     As a teenager       Social History     Socioeconomic History    Marital status:  "   Tobacco Use    Smoking status: Never    Smokeless tobacco: Never   Vaping Use    Vaping Use: Never used   Substance and Sexual Activity    Alcohol use: Yes     Alcohol/week: 1.0 standard drink of alcohol     Types: 1 Glasses of wine per week     Comment: Rarely    Drug use: Never    Sexual activity: Not Currently     Partners: Male     Birth control/protection: Post-menopausal       Family History   Problem Relation Age of Onset    Heart failure Mother 100    Ulcerative colitis Mother     Cancer Mother          at 102    Osteoporosis Mother          102    Cancer Father         Bladder cancer    Diabetes Brother     Colon cancer Neg Hx     Esophageal cancer Neg Hx     Colon polyps Neg Hx     Liver cancer Neg Hx     Rectal cancer Neg Hx     Stomach cancer Neg Hx     Liver disease Neg Hx        Review of Systems   Respiratory:  Negative for shortness of breath.    Cardiovascular:  Negative for chest pain.       Objective    /70 (BP Location: Left arm, Patient Position: Sitting, Cuff Size: Adult)   Pulse 84   Temp 96.8 øF (36 øC) (Infrared)   Ht 170.2 cm (67\")   Wt 96.2 kg (212 lb)   SpO2 95%   Breastfeeding No   BMI 33.20 kg/mý     Physical Exam  Constitutional:       Appearance: She is well-developed.   Pulmonary:      Effort: Pulmonary effort is normal.   Musculoskeletal:         General: Normal range of motion.   Skin:     General: Skin is warm.   Neurological:      Mental Status: She is alert and oriented to person, place, and time.   Psychiatric:         Behavior: Behavior normal.             Lab Results - Last 18 Months   Lab Units 10/06/23  1403 23  0813   HEMOGLOBIN g/dL 12.8 13.6   HEMATOCRIT % 40.1 42.6   MCV fL 90.7 90.1   WBC K/uL 6.9 8.7   RDW % 12.9 12.4   MPV fL 10.7 10.1   PLATELETS K/uL 226 245       Lab Results - Last 18 Months   Lab Units 10/06/23  1403 23  0813 22  1641   TSH uIU/mL 2.010 1.180 3.120   VIT D 25 HYDROXY ng/mL  --  " "49.0  --             IMPRESSION/PLAN:    Assessment & Plan     Problem List Items Addressed This Visit          Gastrointestinal Abdominal     Hyperplastic rectal polyp    Overview     She is not known to have adenomas.          Gastroesophageal reflux disease without esophagitis - Primary    Overview     No Crenshaw's or esophagitis 10/2017.  Benign fundic polyp removed.  Currently on no PPI or H2 RA therapy.    Anti-reflux measures were reviewed and discussed with patient.  Pt advised to refrain from chocolate, alcohol, smoking, peppermint and caffeine.  Also advised to limit fatty foods, large meals, and eating late at nighttime.  Advised to reach an ideal body mass index.         Esophageal spasm    Overview     She describes spasms that start in her back under the right shoulder blade.  GBIS.  No nausea.  They have been going on for years intermittently.  They became more frequently recently.  They resolve with sips of water and lasts for about a minute at most.  She descibes that it gets worse if she \"sits crooked.\"  No dysphagia.  No melena.  She just had a cardiac echo done today at [facility].  No heartburn/regurgitation.  Endoscopy 10/2027 neg for crenshaw's/esophagitis.    I discussed that her symptoms are atypical of esophageal spasms tania since they are occurring under the right shoulder blade/back.  This makes me suspicious for gallbladder.  I offered gallbladder testing such as sonogram and CCK HIDA scan.  At this point she does not feel that her symptoms are severe enough to warrant that but will keep an open mind should they change.      I also discussed referral for motility testing at [place] looking for diffuse esophageal spasm.  I reviewed what this test would entail.  She could even potentially have a normal motility test if she is not having spasms at the time of the evaluation.  She declines this evaluation.  She had an endoscopy done 6 years ago that did not show any complications of reflux " "disease.  In fact, at this point she has absolutely no reflux symptoms.  She does not wish to consider PPI therapy at this point, but knows to call my office if she changes her mind.    We discussed supportive measures other than PPI.  We discussed peppermint oil as a smooth muscle relaxant.  We also discussed using Levsin 0.25 mg 3 times daily as needed.  This has the advantage of being quick acting and will not have the effects on her blood pressure.  This is the first-line treatment I would recommend but she does not feel her symptoms are frequent enough to warrant it at this point.  Should she have symptoms on a chronic basis, we could consider something such as Isordil on a regular basis however this has the potential of affecting blood pressure.    Lastly I emphasized very strongly that women did not necessarily manifest cardiac disease in the typical manner of chest heaviness shortness of air and tingling down the left arm.  She reports to me that she is under the care of Dr. Coulter and just had an echocardiogram that was unremarkable.  She will be following up with her tomorrow as well.             Gluten intolerance    Overview     No small bowel biopsies showing gluten enteropathy celiac labs available for review.  Currently not a complaint because \"I eat it all the time\".         Colon cancer screening    Overview     No adenomas 4/2022.  Family history negative.  Repeat colonoscopy 4/2032 pending overall medical health.            MEDICAL COMPLEXITY must have 2 out of 3   Moderate Complexity Level 4                                                                                                              1 of the following medical problems:                                                                                               []One chronic illness with mild exacerbation                                                                                [x]Two or more stable chronic illness            "                                                                                   [x]One new problem  []One acute illness with systemic symptoms     Complexity of Data  Reviewed (1 out of the 3 following categories)                                             Category 1 tests, documents, historian (must have 3 points)                                                      []Review of prior external records  []Review of results of unique tests  [x]Ordering unique tests -- discussed and declined  []Assessment requires an independent historian   Category 2 Interpretation of tests     []Independent interpretation of test read by another doc   Category 3 Discuss Management/tests  []Discussion with external physician     Risk of complications and/or morbidity                                                                                           []Prescription Drug Management     []Decision for elective endoscopic procedure                  Selena Thomas MD  10/12/23  15:03 CDT    Part of this note may be an electronic transcription/translation of spoken language to printed text.

## 2023-10-12 NOTE — PATIENT INSTRUCTIONS
Anti-reflux measures were reviewed and discussed with patient.  Pt advised to refrain from chocolate, alcohol, smoking, peppermint and caffeine.  Also advised to limit fatty foods, large meals, and eating late at nighttime.  Advised to reach an ideal body mass index.

## 2023-10-12 NOTE — PROGRESS NOTES
"Primary Physician: Kaley Pearson MD    Chief Complaint   Patient presents with    Follow-up     Pt states has chronic issues with esophageal spasms-states normally it only happens a couple times a year but 2 weeks ago she had a spell where she had 6 spasms in a week; Pt's last endo was 10/26/2017       Subjective     Holli Iyer is a 78 y.o. female.    HPI  \"Esophageal spasms\"  She describes spasms that start in her back under the right shoulder blade.  GBIS.  No nausea.  They have been going on for years intermittently.  They became more frequently recently.  They resolve with sips of water and lasts for about a minute at most.  She descibes that it gets worse if she \"sits crooked.\"  No dysphagia.  No melena.  She just had a cardiac echo done today at [facility].  No heartburn/regurgitation    GERD  Endoscopy 10/2017 did not show Stokes's esophagus or esophagitis.  Currently on no antisecretory therapy for acid.  She eats a \"fourth of an apple\" and that helps her. She has gained weight.     \"Gluten sensitivity\"  No small bowel biopsies or celiac labs available for review to show gluten enteropathy.  This was a self made diagnosis in the past, but not currently an active problem because I \"eat it all the time\".  This is not a concern of hers today.    Colon cancer screening  No adenomas 4/2022.  Has history of left-sided hyperplastic polyps which does not increase colon cancer risk.  Repeat colonoscopy 4/2032 pending overall medical health.      Past Medical History:   Diagnosis Date    Anemia     As a child    Asthma     Bronchial asthma    Diverticulosis     GERD (gastroesophageal reflux disease)     History of colon polyps     Hyperthyroidism     Ingrown toenail     Insomnia     Irregular heart beat     MVP (mitral valve prolapse)     Joya splints     In 40's    Sleep apnea     Stress fracture     Stroke     TIA in 1990s    TIA (transient ischemic attack)        Past Surgical History:   Procedure Laterality Date    " BREAST BIOPSY Left     COLONOSCOPY  2015    Diverticulosis in the sigmoid colon; One 5mm hyperplastic polyp in the sigmoid colon; The examination was otherwise normal on direct and retroflexion views; Repeat 5 years    COLONOSCOPY N/A 2022    Diverticulosis in the left colon; One 4mm hyperplastic polyp in the rectum; The examination was otherwise normal on direct and retroflexion views; Repeat 10 years    ENDOSCOPY N/A 10/26/2017    Normal esophagus; A single gastric polyp-biopsied; Normal stomach; Normal examined duodenum        Current Outpatient Medications:     aspirin 81 MG EC tablet, Take 1 tablet by mouth Daily., Disp: , Rfl:     atenolol (TENORMIN) 25 MG tablet, Take 2 tablets by mouth Daily. (Patient taking differently: Take 1 tablet by mouth Daily.), Disp: 90 tablet, Rfl: 3    citalopram (CeleXA) 10 MG tablet, TAKE 1 TABLET EVERY MORNING (Patient taking differently: Take 1 tablet by mouth Daily.), Disp: 90 tablet, Rfl: 0    triamterene-hydrochlorothiazide (DYAZIDE) 37.5-25 MG per capsule, Take 1 capsule by mouth Daily., Disp: , Rfl:     vitamin D (ERGOCALCIFEROL) 1.25 MG (06890 UT) capsule capsule, Take 1 capsule by mouth Every 7 (Seven) Days., Disp: 24 capsule, Rfl: 3    Allergies   Allergen Reactions    Declomycin [Demeclocycline] Palpitations    Sulfa Antibiotics Itching     As a teenager       Social History     Socioeconomic History    Marital status:    Tobacco Use    Smoking status: Never    Smokeless tobacco: Never   Vaping Use    Vaping Use: Never used   Substance and Sexual Activity    Alcohol use: Yes     Alcohol/week: 1.0 standard drink of alcohol     Types: 1 Glasses of wine per week     Comment: Rarely    Drug use: Never    Sexual activity: Not Currently     Partners: Male     Birth control/protection: Post-menopausal       Family History   Problem Relation Age of Onset    Heart failure Mother 100    Ulcerative colitis Mother     Cancer Mother          at 102     "Osteoporosis Mother          102    Cancer Father         Bladder cancer    Diabetes Brother     Colon cancer Neg Hx     Esophageal cancer Neg Hx     Colon polyps Neg Hx     Liver cancer Neg Hx     Rectal cancer Neg Hx     Stomach cancer Neg Hx     Liver disease Neg Hx        Review of Systems   Respiratory:  Negative for shortness of breath.    Cardiovascular:  Negative for chest pain.       Objective     /70 (BP Location: Left arm, Patient Position: Sitting, Cuff Size: Adult)   Pulse 84   Temp 96.8 øF (36 øC) (Infrared)   Ht 170.2 cm (67\")   Wt 96.2 kg (212 lb)   SpO2 95%   Breastfeeding No   BMI 33.20 kg/mý     Physical Exam  Constitutional:       Appearance: She is well-developed.   Pulmonary:      Effort: Pulmonary effort is normal.   Musculoskeletal:         General: Normal range of motion.   Skin:     General: Skin is warm.   Neurological:      Mental Status: She is alert and oriented to person, place, and time.   Psychiatric:         Behavior: Behavior normal.             Lab Results - Last 18 Months   Lab Units 10/06/23  1403 23  0813   HEMOGLOBIN g/dL 12.8 13.6   HEMATOCRIT % 40.1 42.6   MCV fL 90.7 90.1   WBC K/uL 6.9 8.7   RDW % 12.9 12.4   MPV fL 10.7 10.1   PLATELETS K/uL 226 245       Lab Results - Last 18 Months   Lab Units 10/06/23  1403 23  0813 22  1641   TSH uIU/mL 2.010 1.180 3.120   VIT D 25 HYDROXY ng/mL  --  49.0  --             IMPRESSION/PLAN:    Assessment & Plan      Problem List Items Addressed This Visit          Gastrointestinal Abdominal     Hyperplastic rectal polyp    Overview     She is not known to have adenomas.          Gastroesophageal reflux disease without esophagitis - Primary    Overview     No Stokes's or esophagitis 10/2017.  Benign fundic polyp removed.  Currently on no PPI or H2 RA therapy.    Anti-reflux measures were reviewed and discussed with patient.  Pt advised to refrain from chocolate, alcohol, smoking, peppermint and " "caffeine.  Also advised to limit fatty foods, large meals, and eating late at nighttime.  Advised to reach an ideal body mass index.         Esophageal spasm    Overview     She describes spasms that start in her back under the right shoulder blade.  GBIS.  No nausea.  They have been going on for years intermittently.  They became more frequently recently.  They resolve with sips of water and lasts for about a minute at most.  She descibes that it gets worse if she \"sits crooked.\"  No dysphagia.  No melena.  She just had a cardiac echo done today at [facility].  No heartburn/regurgitation.  Endoscopy 10/2027 neg for crenshaw's/esophagitis.    I discussed that her symptoms are atypical of esophageal spasms tania since they are occurring under the right shoulder blade/back.  This makes me suspicious for gallbladder.  I offered gallbladder testing such as sonogram and CCK HIDA scan.  At this point she does not feel that her symptoms are severe enough to warrant that but will keep an open mind should they change.      I also discussed referral for motility testing at [facility] looking for diffuse esophageal spasm.  I reviewed what this test would entail.  She could even potentially have a normal motility test if she is not having spasms at the time of the evaluation.  She declines this evaluation.  She had an endoscopy done 6 years ago that did not show any complications of reflux disease.  In fact, at this point she has absolutely no reflux symptoms.  She does not wish to consider PPI therapy at this point, but knows to call my office if she changes her mind.    We discussed supportive measures other than PPI.  We discussed peppermint oil as a smooth muscle relaxant.  We also discussed using Levsin 0.25 mg 3 times daily as needed.  This has the advantage of being quick acting and will not have the effects on her blood pressure.  This is the first-line treatment I would recommend but she does not feel her symptoms are frequent " "enough to warrant it at this point.  Should she have symptoms on a chronic basis, we could consider something such as Isordil on a regular basis however this has the potential of affecting blood pressure.    Lastly I emphasized very strongly that women did not necessarily manifest cardiac disease in the typical manner of chest heaviness shortness of air and tingling down the left arm.  She reports to me that she is under the care of Dr. Coulter and just had an echocardiogram that was unremarkable.  She will be following up with her tomorrow as well.             Gluten intolerance    Overview     No small bowel biopsies showing gluten enteropathy celiac labs available for review.  Currently not a complaint because \"I eat it all the time\".         Colon cancer screening    Overview     No adenomas 4/2022.  Family history negative.  Repeat colonoscopy 4/2032 pending overall medical health.            MEDICAL COMPLEXITY must have 2 out of 3   Moderate Complexity Level 4                                                                                                              1 of the following medical problems:                                                                                               []One chronic illness with mild exacerbation                                                                                [x]Two or more stable chronic illness                                                                                              [x]One new problem  []One acute illness with systemic symptoms     Complexity of Data  Reviewed (1 out of the 3 following categories)                                             Category 1 tests, documents, historian (must have 3 points)                                                      []Review of prior external records  []Review of results of unique tests  [x]Ordering unique tests -- discussed and declined  []Assessment requires an independent historian   Category " 2 Interpretation of tests     []Independent interpretation of test read by another doc   Category 3 Discuss Management/tests  []Discussion with external physician     Risk of complications and/or morbidity                                                                                           []Prescription Drug Management     []Decision for elective endoscopic procedure                  Selena Thomas MD  10/12/23  15:03 CDT    Part of this note may be an electronic transcription/translation of spoken language to printed text.

## 2023-10-13 ENCOUNTER — HOSPITAL ENCOUNTER (OUTPATIENT)
Dept: GENERAL RADIOLOGY | Age: 78
Discharge: HOME | End: 2023-10-13
Payer: MEDICARE

## 2023-10-13 ENCOUNTER — OFFICE VISIT (OUTPATIENT)
Dept: INTERNAL MEDICINE | Age: 78
End: 2023-10-13
Payer: MEDICARE

## 2023-10-13 VITALS
HEART RATE: 68 BPM | WEIGHT: 212 LBS | BODY MASS INDEX: 33.27 KG/M2 | SYSTOLIC BLOOD PRESSURE: 118 MMHG | OXYGEN SATURATION: 96 % | HEIGHT: 67 IN | DIASTOLIC BLOOD PRESSURE: 76 MMHG

## 2023-10-13 DIAGNOSIS — R07.89 OTHER CHEST PAIN: ICD-10-CM

## 2023-10-13 DIAGNOSIS — K21.9 GASTROESOPHAGEAL REFLUX DISEASE WITHOUT ESOPHAGITIS: ICD-10-CM

## 2023-10-13 DIAGNOSIS — I10 ESSENTIAL HYPERTENSION: Primary | ICD-10-CM

## 2023-10-13 PROCEDURE — 1090F PRES/ABSN URINE INCON ASSESS: CPT | Performed by: INTERNAL MEDICINE

## 2023-10-13 PROCEDURE — 71046 X-RAY EXAM CHEST 2 VIEWS: CPT

## 2023-10-13 PROCEDURE — 1036F TOBACCO NON-USER: CPT | Performed by: INTERNAL MEDICINE

## 2023-10-13 PROCEDURE — G8417 CALC BMI ABV UP PARAM F/U: HCPCS | Performed by: INTERNAL MEDICINE

## 2023-10-13 PROCEDURE — G8484 FLU IMMUNIZE NO ADMIN: HCPCS | Performed by: INTERNAL MEDICINE

## 2023-10-13 PROCEDURE — 3074F SYST BP LT 130 MM HG: CPT | Performed by: INTERNAL MEDICINE

## 2023-10-13 PROCEDURE — 1123F ACP DISCUSS/DSCN MKR DOCD: CPT | Performed by: INTERNAL MEDICINE

## 2023-10-13 PROCEDURE — G8399 PT W/DXA RESULTS DOCUMENT: HCPCS | Performed by: INTERNAL MEDICINE

## 2023-10-13 PROCEDURE — 99214 OFFICE O/P EST MOD 30 MIN: CPT | Performed by: INTERNAL MEDICINE

## 2023-10-13 PROCEDURE — 3078F DIAST BP <80 MM HG: CPT | Performed by: INTERNAL MEDICINE

## 2023-10-13 PROCEDURE — G8427 DOCREV CUR MEDS BY ELIG CLIN: HCPCS | Performed by: INTERNAL MEDICINE

## 2023-10-24 ENCOUNTER — HOSPITAL ENCOUNTER (OUTPATIENT)
Dept: NON INVASIVE DIAGNOSTICS | Age: 78
Discharge: HOME | End: 2023-10-24
Attending: INTERNAL MEDICINE
Payer: MEDICARE

## 2023-10-24 DIAGNOSIS — R07.89 OTHER CHEST PAIN: ICD-10-CM

## 2023-10-24 PROCEDURE — 6360000004 HC RX CONTRAST MEDICATION: Performed by: INTERNAL MEDICINE

## 2023-10-24 PROCEDURE — C8928 TTE W OR W/O FOL W/CON,STRES: HCPCS

## 2023-10-24 RX ADMIN — PERFLUTREN 1.5 ML: 6.52 INJECTION, SUSPENSION INTRAVENOUS at 13:43

## 2023-10-30 ENCOUNTER — OFFICE VISIT (OUTPATIENT)
Dept: INTERNAL MEDICINE | Age: 78
End: 2023-10-30
Payer: MEDICARE

## 2023-10-30 VITALS
HEIGHT: 67 IN | OXYGEN SATURATION: 95 % | SYSTOLIC BLOOD PRESSURE: 128 MMHG | DIASTOLIC BLOOD PRESSURE: 64 MMHG | HEART RATE: 90 BPM | WEIGHT: 214 LBS | BODY MASS INDEX: 33.59 KG/M2

## 2023-10-30 DIAGNOSIS — M70.51 PES ANSERINUS BURSITIS OF BOTH KNEES: ICD-10-CM

## 2023-10-30 DIAGNOSIS — E03.9 ACQUIRED HYPOTHYROIDISM: ICD-10-CM

## 2023-10-30 DIAGNOSIS — I10 PRIMARY HYPERTENSION: Primary | ICD-10-CM

## 2023-10-30 DIAGNOSIS — R60.0 LOCALIZED EDEMA: ICD-10-CM

## 2023-10-30 DIAGNOSIS — K21.9 GASTROESOPHAGEAL REFLUX DISEASE WITHOUT ESOPHAGITIS: ICD-10-CM

## 2023-10-30 DIAGNOSIS — F41.9 ANXIETY: ICD-10-CM

## 2023-10-30 DIAGNOSIS — R06.02 SHORTNESS OF BREATH: ICD-10-CM

## 2023-10-30 DIAGNOSIS — M70.52 PES ANSERINUS BURSITIS OF BOTH KNEES: ICD-10-CM

## 2023-10-30 PROCEDURE — G8399 PT W/DXA RESULTS DOCUMENT: HCPCS | Performed by: INTERNAL MEDICINE

## 2023-10-30 PROCEDURE — 3078F DIAST BP <80 MM HG: CPT | Performed by: INTERNAL MEDICINE

## 2023-10-30 PROCEDURE — 1123F ACP DISCUSS/DSCN MKR DOCD: CPT | Performed by: INTERNAL MEDICINE

## 2023-10-30 PROCEDURE — G8484 FLU IMMUNIZE NO ADMIN: HCPCS | Performed by: INTERNAL MEDICINE

## 2023-10-30 PROCEDURE — 3074F SYST BP LT 130 MM HG: CPT | Performed by: INTERNAL MEDICINE

## 2023-10-30 PROCEDURE — 1036F TOBACCO NON-USER: CPT | Performed by: INTERNAL MEDICINE

## 2023-10-30 PROCEDURE — G8427 DOCREV CUR MEDS BY ELIG CLIN: HCPCS | Performed by: INTERNAL MEDICINE

## 2023-10-30 PROCEDURE — 1090F PRES/ABSN URINE INCON ASSESS: CPT | Performed by: INTERNAL MEDICINE

## 2023-10-30 PROCEDURE — G8417 CALC BMI ABV UP PARAM F/U: HCPCS | Performed by: INTERNAL MEDICINE

## 2023-10-30 PROCEDURE — 99214 OFFICE O/P EST MOD 30 MIN: CPT | Performed by: INTERNAL MEDICINE

## 2023-10-30 RX ORDER — ESCITALOPRAM OXALATE 10 MG/1
10 TABLET ORAL DAILY
Qty: 30 TABLET | Refills: 0 | Status: SHIPPED | OUTPATIENT
Start: 2023-10-30

## 2023-10-30 NOTE — PROGRESS NOTES
Chief Complaint   Patient presents with    6 Month Follow-Up    Hypertension       HPI: Patient is here today to follow-up hypertension edema shortness of breath fatigue we reviewed the work-up to date. Blood pressure is much better she feels some better still short of breath and fatigued some anxiety depression type symptoms.   And diffuse arthralgias especially pain below her knees/bursitis    Past Medical History:   Diagnosis Date    Anxiety     Asthma     GERD (gastroesophageal reflux disease)     Hypertension     Hyperthyroidism     Obesity     Osteoarthritis     Sleep apnea     Vitamin D deficiency        Past Surgical History:   Procedure Laterality Date    BREAST BIOPSY Right 1980    b9    CATARACT REMOVAL      EYE SURGERY         Family History   Problem Relation Age of Onset    Heart Disease Mother         Had a pacemaker in her 80s    Cancer Father         bladder    Diabetes Brother     Breast Cancer Maternal Aunt        Social History     Socioeconomic History    Marital status:      Spouse name: Not on file    Number of children: Not on file    Years of education: Not on file    Highest education level: Not on file   Occupational History    Not on file   Tobacco Use    Smoking status: Never    Smokeless tobacco: Never   Substance and Sexual Activity    Alcohol use: Never    Drug use: Never    Sexual activity: Not Currently     Partners: Male   Other Topics Concern    Not on file   Social History Narrative    Not on file     Social Determinants of Health     Financial Resource Strain: Low Risk  (3/14/2023)    Overall Financial Resource Strain (CARDIA)     Difficulty of Paying Living Expenses: Not hard at all   Food Insecurity: No Food Insecurity (3/14/2023)    Hunger Vital Sign     Worried About Running Out of Food in the Last Year: Never true     Ran Out of Food in the Last Year: Never true   Transportation Needs: Unknown (3/14/2023)    PRAPARE - Transportation     Lack of Transportation

## 2023-10-31 RX ORDER — ATENOLOL 25 MG/1
25 TABLET ORAL DAILY
Qty: 90 TABLET | Refills: 3 | Status: SHIPPED | OUTPATIENT
Start: 2023-10-31 | End: 2024-10-25

## 2023-11-08 PROBLEM — R60.0 LOCALIZED EDEMA: Status: ACTIVE | Noted: 2023-11-08

## 2023-11-29 DIAGNOSIS — F41.9 ANXIETY: ICD-10-CM

## 2023-11-29 RX ORDER — ESCITALOPRAM OXALATE 10 MG/1
10 TABLET ORAL DAILY
Qty: 90 TABLET | Refills: 1 | Status: SHIPPED | OUTPATIENT
Start: 2023-11-29

## 2023-12-14 ENCOUNTER — OFFICE VISIT (OUTPATIENT)
Dept: INTERNAL MEDICINE | Age: 78
End: 2023-12-14
Payer: MEDICARE

## 2023-12-14 VITALS
BODY MASS INDEX: 33.12 KG/M2 | HEIGHT: 67 IN | WEIGHT: 211 LBS | HEART RATE: 70 BPM | DIASTOLIC BLOOD PRESSURE: 72 MMHG | SYSTOLIC BLOOD PRESSURE: 130 MMHG | OXYGEN SATURATION: 93 %

## 2023-12-14 DIAGNOSIS — M70.52 PES ANSERINUS BURSITIS OF BOTH KNEES: ICD-10-CM

## 2023-12-14 DIAGNOSIS — M70.51 PES ANSERINUS BURSITIS OF BOTH KNEES: ICD-10-CM

## 2023-12-14 DIAGNOSIS — I10 PRIMARY HYPERTENSION: Primary | ICD-10-CM

## 2023-12-14 DIAGNOSIS — K64.9 ACUTE HEMORRHOID: ICD-10-CM

## 2023-12-14 DIAGNOSIS — F41.9 ANXIETY: ICD-10-CM

## 2023-12-14 DIAGNOSIS — Z29.11 NEED FOR RSV VACCINATION: ICD-10-CM

## 2023-12-14 DIAGNOSIS — E55.9 VITAMIN D DEFICIENCY: ICD-10-CM

## 2023-12-14 PROCEDURE — G8417 CALC BMI ABV UP PARAM F/U: HCPCS | Performed by: INTERNAL MEDICINE

## 2023-12-14 PROCEDURE — 3078F DIAST BP <80 MM HG: CPT | Performed by: INTERNAL MEDICINE

## 2023-12-14 PROCEDURE — 1090F PRES/ABSN URINE INCON ASSESS: CPT | Performed by: INTERNAL MEDICINE

## 2023-12-14 PROCEDURE — 1123F ACP DISCUSS/DSCN MKR DOCD: CPT | Performed by: INTERNAL MEDICINE

## 2023-12-14 PROCEDURE — 3075F SYST BP GE 130 - 139MM HG: CPT | Performed by: INTERNAL MEDICINE

## 2023-12-14 PROCEDURE — 99214 OFFICE O/P EST MOD 30 MIN: CPT | Performed by: INTERNAL MEDICINE

## 2023-12-14 PROCEDURE — 1036F TOBACCO NON-USER: CPT | Performed by: INTERNAL MEDICINE

## 2023-12-14 PROCEDURE — G8484 FLU IMMUNIZE NO ADMIN: HCPCS | Performed by: INTERNAL MEDICINE

## 2023-12-14 PROCEDURE — G8399 PT W/DXA RESULTS DOCUMENT: HCPCS | Performed by: INTERNAL MEDICINE

## 2023-12-14 PROCEDURE — G8427 DOCREV CUR MEDS BY ELIG CLIN: HCPCS | Performed by: INTERNAL MEDICINE

## 2023-12-14 RX ORDER — HYDROCORTISONE 25 MG/G
CREAM TOPICAL
Qty: 28 G | Refills: 1 | Status: SHIPPED | OUTPATIENT
Start: 2023-12-14 | End: 2023-12-14 | Stop reason: SDUPTHER

## 2023-12-14 RX ORDER — HYDROCORTISONE ACETATE 25 MG/1
25 SUPPOSITORY RECTAL EVERY 12 HOURS
Qty: 12 SUPPOSITORY | Refills: 1 | Status: SHIPPED | OUTPATIENT
Start: 2023-12-14 | End: 2023-12-14 | Stop reason: SDUPTHER

## 2023-12-14 RX ORDER — HYDROCORTISONE ACETATE 25 MG/1
25 SUPPOSITORY RECTAL EVERY 12 HOURS
Qty: 12 SUPPOSITORY | Refills: 1 | Status: SHIPPED | OUTPATIENT
Start: 2023-12-14 | End: 2023-12-29 | Stop reason: SDUPTHER

## 2023-12-14 RX ORDER — HYDROCORTISONE 25 MG/G
CREAM TOPICAL
Qty: 28 G | Refills: 1 | Status: SHIPPED | OUTPATIENT
Start: 2023-12-14

## 2023-12-14 NOTE — PROGRESS NOTES
Chief Complaint   Patient presents with    Follow-up     2 months    Hypertension       HPI: Patient is here today to follow-up hypertension anxiety depression other medical issues. She does seem to feel better since Lexapro was added also her stressors are better. We changed her Celexa to Lexapro in the recent past.  We have also been treating her for hypertension. She is still with some fatigue. She complains today of problems with hemorrhoids. Rectal itching discomfort. Ongoing pain in her legs. Past Medical History:   Diagnosis Date    Anxiety     Asthma     GERD (gastroesophageal reflux disease)     Hypertension     Hyperthyroidism     Obesity     Osteoarthritis     Sleep apnea     Vitamin D deficiency        Past Surgical History:   Procedure Laterality Date    BREAST BIOPSY Right 1980    b9    CATARACT REMOVAL      EYE SURGERY         Family History   Problem Relation Age of Onset    Heart Disease Mother         Had a pacemaker in her 80s    Cancer Father         bladder    Diabetes Brother     Breast Cancer Maternal Aunt        Social History     Socioeconomic History    Marital status:      Spouse name: Not on file    Number of children: Not on file    Years of education: Not on file    Highest education level: Not on file   Occupational History    Not on file   Tobacco Use    Smoking status: Never    Smokeless tobacco: Never   Substance and Sexual Activity    Alcohol use: Never    Drug use: Never    Sexual activity: Not Currently     Partners: Male   Other Topics Concern    Not on file   Social History Narrative    Not on file     Social Determinants of Health     Financial Resource Strain: Low Risk  (3/14/2023)    Overall Financial Resource Strain (CARDIA)     Difficulty of Paying Living Expenses: Not hard at all   Food Insecurity: Not on file (3/14/2023)   Transportation Needs: Unknown (3/14/2023)    PRAPARE - Transportation     Lack of Transportation (Medical):  Not on file     Lack of

## 2023-12-27 DIAGNOSIS — R60.0 LOCALIZED EDEMA: ICD-10-CM

## 2023-12-27 DIAGNOSIS — I10 PRIMARY HYPERTENSION: ICD-10-CM

## 2023-12-27 PROBLEM — M54.50 ACUTE RIGHT-SIDED LOW BACK PAIN WITHOUT SCIATICA: Status: RESOLVED | Noted: 2023-07-11 | Resolved: 2023-12-27

## 2023-12-27 PROBLEM — R26.81 UNSTEADY GAIT: Status: RESOLVED | Noted: 2023-07-11 | Resolved: 2023-12-27

## 2023-12-27 PROBLEM — R12 HEARTBURN: Status: RESOLVED | Noted: 2017-08-31 | Resolved: 2023-12-27

## 2023-12-27 PROBLEM — K64.9 ACUTE HEMORRHOID: Status: ACTIVE | Noted: 2023-12-27

## 2023-12-27 PROBLEM — R49.9 CHANGE IN VOICE: Status: RESOLVED | Noted: 2022-11-07 | Resolved: 2023-12-27

## 2023-12-27 PROBLEM — M25.511 ACUTE PAIN OF RIGHT SHOULDER: Status: RESOLVED | Noted: 2023-03-14 | Resolved: 2023-12-27

## 2023-12-27 PROBLEM — R05.3 CHRONIC COUGH: Status: ACTIVE | Noted: 2023-07-11

## 2023-12-27 PROBLEM — J40 BRONCHITIS: Status: RESOLVED | Noted: 2023-06-13 | Resolved: 2023-12-27

## 2023-12-27 RX ORDER — TRIAMTERENE AND HYDROCHLOROTHIAZIDE 37.5; 25 MG/1; MG/1
1 CAPSULE ORAL DAILY
Qty: 90 CAPSULE | Refills: 3 | Status: SHIPPED | OUTPATIENT
Start: 2023-12-27

## 2023-12-29 RX ORDER — HYDROCORTISONE ACETATE 25 MG/1
25 SUPPOSITORY RECTAL EVERY 12 HOURS
Qty: 12 SUPPOSITORY | Refills: 1 | Status: SHIPPED | OUTPATIENT
Start: 2023-12-29

## 2024-01-02 ENCOUNTER — OFFICE VISIT (OUTPATIENT)
Dept: INTERNAL MEDICINE | Age: 79
End: 2024-01-02
Payer: MEDICARE

## 2024-01-02 VITALS
HEART RATE: 72 BPM | OXYGEN SATURATION: 96 % | DIASTOLIC BLOOD PRESSURE: 62 MMHG | WEIGHT: 215 LBS | SYSTOLIC BLOOD PRESSURE: 118 MMHG | BODY MASS INDEX: 33.67 KG/M2 | TEMPERATURE: 97 F

## 2024-01-02 DIAGNOSIS — K64.4 EXTERNAL HEMORRHOID: Primary | ICD-10-CM

## 2024-01-02 PROCEDURE — G8399 PT W/DXA RESULTS DOCUMENT: HCPCS | Performed by: NURSE PRACTITIONER

## 2024-01-02 PROCEDURE — 1090F PRES/ABSN URINE INCON ASSESS: CPT | Performed by: NURSE PRACTITIONER

## 2024-01-02 PROCEDURE — 3074F SYST BP LT 130 MM HG: CPT | Performed by: NURSE PRACTITIONER

## 2024-01-02 PROCEDURE — G8484 FLU IMMUNIZE NO ADMIN: HCPCS | Performed by: NURSE PRACTITIONER

## 2024-01-02 PROCEDURE — G8427 DOCREV CUR MEDS BY ELIG CLIN: HCPCS | Performed by: NURSE PRACTITIONER

## 2024-01-02 PROCEDURE — 1123F ACP DISCUSS/DSCN MKR DOCD: CPT | Performed by: NURSE PRACTITIONER

## 2024-01-02 PROCEDURE — 1036F TOBACCO NON-USER: CPT | Performed by: NURSE PRACTITIONER

## 2024-01-02 PROCEDURE — G8417 CALC BMI ABV UP PARAM F/U: HCPCS | Performed by: NURSE PRACTITIONER

## 2024-01-02 PROCEDURE — 99213 OFFICE O/P EST LOW 20 MIN: CPT | Performed by: NURSE PRACTITIONER

## 2024-01-02 PROCEDURE — 3078F DIAST BP <80 MM HG: CPT | Performed by: NURSE PRACTITIONER

## 2024-01-02 RX ORDER — HYDROCORTISONE ACETATE 25 MG/1
25 SUPPOSITORY RECTAL EVERY 12 HOURS
Qty: 12 SUPPOSITORY | Refills: 1 | Status: SHIPPED | OUTPATIENT
Start: 2024-01-02

## 2024-01-02 ASSESSMENT — ENCOUNTER SYMPTOMS: RECTAL PAIN: 1

## 2024-01-02 NOTE — PROGRESS NOTES
[facility]  [facility]  [address] [place] [address] [address]IVE  [address]  [place] [address]  Dept: [phone]  Dept Fax: [phone]  Loc: [phone]    Mel Damon is a 78 y.o. female who presents today for her medical conditions/complaintsas noted below.  Mel Damon is c/o of Hemorrhoids (Look at hemorrhoid )        HPI:     HPI  Mel presents today with hemorrhoids. This has been bothering her for about a month now. Two days was severe itching. Saw PCP and got cream and suppository and it has helped some. It is not difficult to have a BM. She reports no rectal bleeding.   Past Medical History:   Diagnosis Date    Anxiety     Asthma     GERD (gastroesophageal reflux disease)     Hypertension     Hyperthyroidism     Obesity     Osteoarthritis     Sleep apnea     Vitamin D deficiency      Past Surgical History:   Procedure Laterality Date    BREAST BIOPSY Right 1980    b9    CATARACT REMOVAL      EYE SURGERY         Family History   Problem Relation Age of Onset    Heart Disease Mother         Had a pacemaker in her 90s    Cancer Father         bladder    Diabetes Brother     Breast Cancer Maternal Aunt        Social History     Tobacco Use    Smoking status: Never    Smokeless tobacco: Never   Substance Use Topics    Alcohol use: Never      Current Outpatient Medications   Medication Sig Dispense Refill    hydrocortisone (ANUSOL-HC) 25 MG suppository Place 1 suppository rectally in the morning and 1 suppository in the evening. And after bm prn. 12 suppository 1    triamterene-hydroCHLOROthiazide (DYAZIDE) 37.5-25 MG per capsule Take 1 capsule by mouth daily 90 capsule 3    hydrocortisone (ANUSOL-HC) 2.5 % CREA rectal cream Use twice a day as needed and prn post bm 28 g 1    escitalopram (LEXAPRO) 10 MG tablet TAKE 1 TABLET BY MOUTH DAILY 90 tablet 1    atenolol (TENORMIN) 25 MG tablet Take 1 tablet by mouth daily 90 tablet 3    RESTASIS 0.05 % ophthalmic emulsion       diclofenac sodium

## 2024-01-16 ENCOUNTER — TELEPHONE (OUTPATIENT)
Dept: INTERNAL MEDICINE | Age: 79
End: 2024-01-16

## 2024-01-16 RX ORDER — METHYLPREDNISOLONE 4 MG/1
TABLET ORAL
Qty: 1 KIT | Refills: 0 | Status: SHIPPED | OUTPATIENT
Start: 2024-01-16 | End: 2024-01-22

## 2024-01-16 RX ORDER — AZITHROMYCIN 250 MG/1
250 TABLET, FILM COATED ORAL SEE ADMIN INSTRUCTIONS
Qty: 6 TABLET | Refills: 0 | Status: SHIPPED | OUTPATIENT
Start: 2024-01-16 | End: 2024-01-21

## 2024-01-16 NOTE — TELEPHONE ENCOUNTER
I spoke with her and sent in medicine -- she had covid recently and took paxlovid.  She has been better but still some asthmatic type cough and yellow sputum-- zpack/ medrol pack sent in

## 2024-01-16 NOTE — TELEPHONE ENCOUNTER
Pt would like a return call from [facility] or [facility] specifically. She did not want to leave a message with me.

## 2024-01-29 ASSESSMENT — PATIENT HEALTH QUESTIONNAIRE - PHQ9
7. TROUBLE CONCENTRATING ON THINGS, SUCH AS READING THE NEWSPAPER OR WATCHING TELEVISION: NOT AT ALL
8. MOVING OR SPEAKING SO SLOWLY THAT OTHER PEOPLE COULD HAVE NOTICED. OR THE OPPOSITE, BEING SO FIGETY OR RESTLESS THAT YOU HAVE BEEN MOVING AROUND A LOT MORE THAN USUAL: 0
1. LITTLE INTEREST OR PLEASURE IN DOING THINGS: NOT AT ALL
4. FEELING TIRED OR HAVING LITTLE ENERGY: 0
SUM OF ALL RESPONSES TO PHQ9 QUESTIONS 1 & 2: 0
SUM OF ALL RESPONSES TO PHQ QUESTIONS 1-9: 0
5. POOR APPETITE OR OVEREATING: 0
3. TROUBLE FALLING OR STAYING ASLEEP: 0
SUM OF ALL RESPONSES TO PHQ QUESTIONS 1-9: 0
10. IF YOU CHECKED OFF ANY PROBLEMS, HOW DIFFICULT HAVE THESE PROBLEMS MADE IT FOR YOU TO DO YOUR WORK, TAKE CARE OF THINGS AT HOME, OR GET ALONG WITH OTHER PEOPLE: NOT DIFFICULT AT ALL
5. POOR APPETITE OR OVEREATING: NOT AT ALL
3. TROUBLE FALLING OR STAYING ASLEEP: NOT AT ALL
6. FEELING BAD ABOUT YOURSELF - OR THAT YOU ARE A FAILURE OR HAVE LET YOURSELF OR YOUR FAMILY DOWN: NOT AT ALL
1. LITTLE INTEREST OR PLEASURE IN DOING THINGS: 0
9. THOUGHTS THAT YOU WOULD BE BETTER OFF DEAD, OR OF HURTING YOURSELF: NOT AT ALL
SUM OF ALL RESPONSES TO PHQ QUESTIONS 1-9: 0
6. FEELING BAD ABOUT YOURSELF - OR THAT YOU ARE A FAILURE OR HAVE LET YOURSELF OR YOUR FAMILY DOWN: 0
SUM OF ALL RESPONSES TO PHQ QUESTIONS 1-9: 0
SUM OF ALL RESPONSES TO PHQ QUESTIONS 1-9: 0
7. TROUBLE CONCENTRATING ON THINGS, SUCH AS READING THE NEWSPAPER OR WATCHING TELEVISION: 0
10. IF YOU CHECKED OFF ANY PROBLEMS, HOW DIFFICULT HAVE THESE PROBLEMS MADE IT FOR YOU TO DO YOUR WORK, TAKE CARE OF THINGS AT HOME, OR GET ALONG WITH OTHER PEOPLE: 0
4. FEELING TIRED OR HAVING LITTLE ENERGY: NOT AT ALL
2. FEELING DOWN, DEPRESSED OR HOPELESS: NOT AT ALL
8. MOVING OR SPEAKING SO SLOWLY THAT OTHER PEOPLE COULD HAVE NOTICED. OR THE OPPOSITE - BEING SO FIDGETY OR RESTLESS THAT YOU HAVE BEEN MOVING AROUND A LOT MORE THAN USUAL: NOT AT ALL
9. THOUGHTS THAT YOU WOULD BE BETTER OFF DEAD, OR OF HURTING YOURSELF: 0
2. FEELING DOWN, DEPRESSED OR HOPELESS: 0

## 2024-01-31 ENCOUNTER — OFFICE VISIT (OUTPATIENT)
Dept: INTERNAL MEDICINE | Age: 79
End: 2024-01-31
Payer: MEDICARE

## 2024-01-31 VITALS
HEIGHT: 67 IN | SYSTOLIC BLOOD PRESSURE: 122 MMHG | HEART RATE: 77 BPM | WEIGHT: 215 LBS | DIASTOLIC BLOOD PRESSURE: 72 MMHG | OXYGEN SATURATION: 96 % | BODY MASS INDEX: 33.74 KG/M2

## 2024-01-31 DIAGNOSIS — I10 PRIMARY HYPERTENSION: ICD-10-CM

## 2024-01-31 DIAGNOSIS — E55.9 VITAMIN D DEFICIENCY: ICD-10-CM

## 2024-01-31 DIAGNOSIS — K64.9 ACUTE HEMORRHOID: ICD-10-CM

## 2024-01-31 DIAGNOSIS — J20.9 ACUTE BRONCHITIS WITH BRONCHOSPASM: Primary | ICD-10-CM

## 2024-01-31 DIAGNOSIS — E03.9 ACQUIRED HYPOTHYROIDISM: ICD-10-CM

## 2024-01-31 DIAGNOSIS — F32.9 REACTIVE DEPRESSION: ICD-10-CM

## 2024-01-31 PROCEDURE — 1123F ACP DISCUSS/DSCN MKR DOCD: CPT | Performed by: INTERNAL MEDICINE

## 2024-01-31 PROCEDURE — 1036F TOBACCO NON-USER: CPT | Performed by: INTERNAL MEDICINE

## 2024-01-31 PROCEDURE — 3078F DIAST BP <80 MM HG: CPT | Performed by: INTERNAL MEDICINE

## 2024-01-31 PROCEDURE — 1090F PRES/ABSN URINE INCON ASSESS: CPT | Performed by: INTERNAL MEDICINE

## 2024-01-31 PROCEDURE — 99214 OFFICE O/P EST MOD 30 MIN: CPT | Performed by: INTERNAL MEDICINE

## 2024-01-31 PROCEDURE — G8427 DOCREV CUR MEDS BY ELIG CLIN: HCPCS | Performed by: INTERNAL MEDICINE

## 2024-01-31 PROCEDURE — G8484 FLU IMMUNIZE NO ADMIN: HCPCS | Performed by: INTERNAL MEDICINE

## 2024-01-31 PROCEDURE — G8417 CALC BMI ABV UP PARAM F/U: HCPCS | Performed by: INTERNAL MEDICINE

## 2024-01-31 PROCEDURE — 3074F SYST BP LT 130 MM HG: CPT | Performed by: INTERNAL MEDICINE

## 2024-01-31 PROCEDURE — G8399 PT W/DXA RESULTS DOCUMENT: HCPCS | Performed by: INTERNAL MEDICINE

## 2024-01-31 RX ORDER — ALBUTEROL SULFATE 90 UG/1
2 AEROSOL, METERED RESPIRATORY (INHALATION) 4 TIMES DAILY PRN
Qty: 18 G | Refills: 0 | Status: SHIPPED | OUTPATIENT
Start: 2024-01-31

## 2024-01-31 RX ORDER — ATENOLOL 50 MG/1
50 TABLET ORAL DAILY
Qty: 90 TABLET | Refills: 3 | Status: SHIPPED | OUTPATIENT
Start: 2024-01-31 | End: 2025-01-25

## 2024-01-31 RX ORDER — CITALOPRAM 20 MG/1
20 TABLET ORAL DAILY
Qty: 90 TABLET | Refills: 3
Start: 2024-01-31

## 2024-01-31 NOTE — PROGRESS NOTES
Chief Complaint   Patient presents with    Follow-up     Discuss hemorrhoid surgeon       HPI: Patient is here today to follow-up recent URI issues and issues with hemorrhoids hemorrhoids are better currently and surgery not recommended because of this.  Her cough congestion and URI symptoms are also better she seems to be improved today.  Her anxiety and bp are both better.  We reviewed and renewed medications.     Past Medical History:   Diagnosis Date    Anxiety     Asthma     GERD (gastroesophageal reflux disease)     Hypertension     Hyperthyroidism     Obesity     Osteoarthritis     Sleep apnea     Vitamin D deficiency        Past Surgical History:   Procedure Laterality Date    BREAST BIOPSY Right 1980    b9    CATARACT REMOVAL      EYE SURGERY         Family History   Problem Relation Age of Onset    Heart Disease Mother         Had a pacemaker in her 90s    Cancer Father         bladder    Diabetes Brother     Breast Cancer Maternal Aunt        Social History     Socioeconomic History    Marital status:      Spouse name: Not on file    Number of children: Not on file    Years of education: Not on file    Highest education level: Not on file   Occupational History    Not on file   Tobacco Use    Smoking status: Never    Smokeless tobacco: Never   Substance and Sexual Activity    Alcohol use: Never    Drug use: Never    Sexual activity: Not Currently     Partners: Male   Other Topics Concern    Not on file   Social History Narrative    Not on file     Social Determinants of Health     Financial Resource Strain: Low Risk  (3/14/2023)    Overall Financial Resource Strain (CARDIA)     Difficulty of Paying Living Expenses: Not hard at all   Food Insecurity: Not on file (3/14/2023)   Transportation Needs: Unknown (3/14/2023)    PRAPARE - Transportation     Lack of Transportation (Medical): Not on file     Lack of Transportation (Non-Medical): No   Physical Activity: Insufficiently Active (3/30/2023)

## 2024-02-12 ENCOUNTER — TELEPHONE (OUTPATIENT)
Dept: INTERNAL MEDICINE | Age: 79
End: 2024-02-12

## 2024-02-12 DIAGNOSIS — R09.89 CHRONIC SINUS COMPLAINTS: Primary | ICD-10-CM

## 2024-02-12 NOTE — TELEPHONE ENCOUNTER
Pt called wanting a referral to Dr. Blayne Chaves. States she has discussed on-going sinus problems, CPAP use, and URI sx before.

## 2024-02-29 ENCOUNTER — TELEPHONE (OUTPATIENT)
Dept: PODIATRY | Facility: CLINIC | Age: 79
End: 2024-02-29
Payer: MEDICARE

## 2024-02-29 NOTE — TELEPHONE ENCOUNTER
Called pt to see about moving her appt up with DAVID, pt wanted to keep this appt with dr young at this time. I did advise pt that going forward after upcoming visit that the [facility] would be taking over most RFC.

## 2024-03-18 ENCOUNTER — PATIENT MESSAGE (OUTPATIENT)
Dept: ENT CLINIC | Age: 79
End: 2024-03-18

## 2024-03-25 DIAGNOSIS — E03.9 ACQUIRED HYPOTHYROIDISM: ICD-10-CM

## 2024-03-25 DIAGNOSIS — E55.9 VITAMIN D DEFICIENCY: ICD-10-CM

## 2024-03-25 DIAGNOSIS — I10 PRIMARY HYPERTENSION: ICD-10-CM

## 2024-03-25 LAB
25(OH)D3 SERPL-MCNC: 49.2 NG/ML
ALBUMIN SERPL-MCNC: 4.2 G/DL (ref 3.5–5.2)
ALP SERPL-CCNC: 92 U/L (ref 35–104)
ALT SERPL-CCNC: 24 U/L (ref 5–33)
ANION GAP SERPL CALCULATED.3IONS-SCNC: 11 MMOL/L (ref 7–19)
AST SERPL-CCNC: 19 U/L (ref 5–32)
BILIRUB SERPL-MCNC: 0.7 MG/DL (ref 0.2–1.2)
BILIRUB UR QL STRIP: NEGATIVE
BUN SERPL-MCNC: 19 MG/DL (ref 8–23)
CALCIUM SERPL-MCNC: 9.1 MG/DL (ref 8.8–10.2)
CHLORIDE SERPL-SCNC: 105 MMOL/L (ref 98–111)
CHOLEST SERPL-MCNC: 179 MG/DL (ref 160–199)
CLARITY UR: CLEAR
CO2 SERPL-SCNC: 24 MMOL/L (ref 22–29)
COLOR UR: YELLOW
CREAT SERPL-MCNC: 0.7 MG/DL (ref 0.5–0.9)
EPI CELLS #/AREA URNS AUTO: 3 /HPF (ref 0–5)
ERYTHROCYTE [DISTWIDTH] IN BLOOD BY AUTOMATED COUNT: 13 % (ref 11.5–14.5)
GLUCOSE SERPL-MCNC: 113 MG/DL (ref 74–109)
GLUCOSE UR STRIP.AUTO-MCNC: NEGATIVE MG/DL
HCT VFR BLD AUTO: 39.9 % (ref 37–47)
HDLC SERPL-MCNC: 43 MG/DL (ref 65–121)
HGB BLD-MCNC: 12.8 G/DL (ref 12–16)
HGB UR STRIP.AUTO-MCNC: NEGATIVE MG/L
HYALINE CASTS #/AREA URNS AUTO: 5 /HPF (ref 0–8)
KETONES UR STRIP.AUTO-MCNC: NEGATIVE MG/DL
LDLC SERPL CALC-MCNC: 108 MG/DL
LEUKOCYTE ESTERASE UR QL STRIP.AUTO: ABNORMAL
MCH RBC QN AUTO: 28.4 PG (ref 27–31)
MCHC RBC AUTO-ENTMCNC: 32.1 G/DL (ref 33–37)
MCV RBC AUTO: 88.7 FL (ref 81–99)
NITRITE UR QL STRIP.AUTO: NEGATIVE
PH UR STRIP.AUTO: 5.5 [PH] (ref 5–8)
PLATELET # BLD AUTO: 238 K/UL (ref 130–400)
PMV BLD AUTO: 10.6 FL (ref 9.4–12.3)
POTASSIUM SERPL-SCNC: 3.9 MMOL/L (ref 3.5–5)
PROT SERPL-MCNC: 6.6 G/DL (ref 6.6–8.7)
PROT UR STRIP.AUTO-MCNC: NEGATIVE MG/DL
RBC # BLD AUTO: 4.5 M/UL (ref 4.2–5.4)
RBC #/AREA URNS AUTO: 1 /HPF (ref 0–4)
SODIUM SERPL-SCNC: 140 MMOL/L (ref 136–145)
SP GR UR STRIP.AUTO: 1.02 (ref 1–1.03)
TRIGL SERPL-MCNC: 142 MG/DL (ref 0–149)
TSH SERPL DL<=0.005 MIU/L-ACNC: 1.81 UIU/ML (ref 0.27–4.2)
UROBILINOGEN UR STRIP.AUTO-MCNC: 0.2 E.U./DL
WBC # BLD AUTO: 7 K/UL (ref 4.8–10.8)
WBC #/AREA URNS AUTO: 8 /HPF (ref 0–5)

## 2024-03-26 ENCOUNTER — OFFICE VISIT (OUTPATIENT)
Dept: ENT CLINIC | Age: 79
End: 2024-03-26
Payer: MEDICARE

## 2024-03-26 VITALS
DIASTOLIC BLOOD PRESSURE: 76 MMHG | SYSTOLIC BLOOD PRESSURE: 122 MMHG | WEIGHT: 219 LBS | HEIGHT: 67 IN | BODY MASS INDEX: 34.37 KG/M2

## 2024-03-26 DIAGNOSIS — G47.33 OSA (OBSTRUCTIVE SLEEP APNEA): ICD-10-CM

## 2024-03-26 DIAGNOSIS — R09.81 CHRONIC NASAL CONGESTION: ICD-10-CM

## 2024-03-26 DIAGNOSIS — J34.3 HYPERTROPHY OF INFERIOR NASAL TURBINATE: Primary | ICD-10-CM

## 2024-03-26 PROCEDURE — G8484 FLU IMMUNIZE NO ADMIN: HCPCS | Performed by: OTOLARYNGOLOGY

## 2024-03-26 PROCEDURE — 1036F TOBACCO NON-USER: CPT | Performed by: OTOLARYNGOLOGY

## 2024-03-26 PROCEDURE — G8417 CALC BMI ABV UP PARAM F/U: HCPCS | Performed by: OTOLARYNGOLOGY

## 2024-03-26 PROCEDURE — 3074F SYST BP LT 130 MM HG: CPT | Performed by: OTOLARYNGOLOGY

## 2024-03-26 PROCEDURE — 3078F DIAST BP <80 MM HG: CPT | Performed by: OTOLARYNGOLOGY

## 2024-03-26 PROCEDURE — 1123F ACP DISCUSS/DSCN MKR DOCD: CPT | Performed by: OTOLARYNGOLOGY

## 2024-03-26 PROCEDURE — 1090F PRES/ABSN URINE INCON ASSESS: CPT | Performed by: OTOLARYNGOLOGY

## 2024-03-26 PROCEDURE — G8399 PT W/DXA RESULTS DOCUMENT: HCPCS | Performed by: OTOLARYNGOLOGY

## 2024-03-26 PROCEDURE — G8427 DOCREV CUR MEDS BY ELIG CLIN: HCPCS | Performed by: OTOLARYNGOLOGY

## 2024-03-26 PROCEDURE — 99204 OFFICE O/P NEW MOD 45 MIN: CPT | Performed by: OTOLARYNGOLOGY

## 2024-03-26 ASSESSMENT — ENCOUNTER SYMPTOMS
GASTROINTESTINAL NEGATIVE: 1
ALLERGIC/IMMUNOLOGIC NEGATIVE: 1
EYES NEGATIVE: 1
SINUS PRESSURE: 1
RESPIRATORY NEGATIVE: 1

## 2024-03-26 NOTE — PROGRESS NOTES
3/26/2024    Mel Damon (:  1945) is a 78 y.o. female, Established patient, here for evaluation of the following chief complaint(s):  New Patient (Nasal congestion)      Vitals:    24 0944   BP: 122/76   Weight: 99.3 kg (219 lb)   Height: 1.702 m (5' 7\")       Wt Readings from Last 3 Encounters:   24 99.3 kg (219 lb)   24 97.5 kg (215 lb)   24 97.5 kg (215 lb)       BP Readings from Last 3 Encounters:   24 122/76   24 122/72   24 118/62         SUBJECTIVE/OBJECTIVE:    Patient seen today for her nose and her sinuses.  She suffers from sleep apnea and uses nasal pillows.  She says she feels obstructed most times when she is laying down.  She was told by her primary care that she might have nasal polyps.  She complains of sinus pressure and swelling on for many years.  She is never had a CT scan.  She does report occasional allergies but she does not take allergy medications.        Review of Systems   Constitutional: Negative.    HENT:  Positive for congestion and sinus pressure.    Eyes: Negative.    Respiratory: Negative.     Cardiovascular: Negative.    Gastrointestinal: Negative.    Endocrine: Negative.    Musculoskeletal: Negative.    Skin: Negative.    Allergic/Immunologic: Negative.    Neurological: Negative.    Hematological: Negative.    Psychiatric/Behavioral: Negative.          Physical Exam  Vitals reviewed.   Constitutional:       Appearance: Normal appearance. She is normal weight.   HENT:      Head: Normocephalic and atraumatic.      Right Ear: Tympanic membrane, ear canal and external ear normal.      Left Ear: Tympanic membrane, ear canal and external ear normal.      Nose: Nose normal.      Right Turbinates: Enlarged.      Left Turbinates: Enlarged.      Mouth/Throat:      Mouth: Mucous membranes are moist.      Pharynx: Oropharynx is clear.   Eyes:      Extraocular Movements: Extraocular movements intact.      Pupils: Pupils are equal, round,

## 2024-03-27 ENCOUNTER — OFFICE VISIT (OUTPATIENT)
Dept: INTERNAL MEDICINE | Age: 79
End: 2024-03-27
Payer: MEDICARE

## 2024-03-27 VITALS
HEART RATE: 68 BPM | WEIGHT: 219 LBS | DIASTOLIC BLOOD PRESSURE: 80 MMHG | SYSTOLIC BLOOD PRESSURE: 124 MMHG | BODY MASS INDEX: 34.37 KG/M2 | HEIGHT: 67 IN | OXYGEN SATURATION: 96 %

## 2024-03-27 DIAGNOSIS — F32.9 REACTIVE DEPRESSION: ICD-10-CM

## 2024-03-27 DIAGNOSIS — K21.9 GASTROESOPHAGEAL REFLUX DISEASE WITHOUT ESOPHAGITIS: ICD-10-CM

## 2024-03-27 DIAGNOSIS — G47.33 OSA (OBSTRUCTIVE SLEEP APNEA): ICD-10-CM

## 2024-03-27 DIAGNOSIS — Z00.00 MEDICARE ANNUAL WELLNESS VISIT, SUBSEQUENT: Primary | ICD-10-CM

## 2024-03-27 DIAGNOSIS — I10 ESSENTIAL HYPERTENSION: ICD-10-CM

## 2024-03-27 DIAGNOSIS — R05.3 CHRONIC COUGH: ICD-10-CM

## 2024-03-27 DIAGNOSIS — J30.89 NON-SEASONAL ALLERGIC RHINITIS DUE TO OTHER ALLERGIC TRIGGER: ICD-10-CM

## 2024-03-27 DIAGNOSIS — F41.9 ANXIETY: ICD-10-CM

## 2024-03-27 DIAGNOSIS — Z86.79 HISTORY OF MITRAL VALVE PROLAPSE: ICD-10-CM

## 2024-03-27 DIAGNOSIS — I10 PRIMARY HYPERTENSION: ICD-10-CM

## 2024-03-27 PROBLEM — M17.10 ARTHRITIS OF KNEE: Status: RESOLVED | Noted: 2020-05-18 | Resolved: 2024-03-27

## 2024-03-27 PROBLEM — H40.9 GLAUCOMA: Status: RESOLVED | Noted: 2020-05-18 | Resolved: 2024-03-27

## 2024-03-27 PROBLEM — K90.41 GLUTEN INTOLERANCE: Status: RESOLVED | Noted: 2020-05-18 | Resolved: 2024-03-27

## 2024-03-27 PROBLEM — E03.9 HYPOTHYROIDISM: Status: RESOLVED | Noted: 2022-11-07 | Resolved: 2024-03-27

## 2024-03-27 PROBLEM — G47.00 INSOMNIA: Status: RESOLVED | Noted: 2020-05-18 | Resolved: 2024-03-27

## 2024-03-27 PROBLEM — F32.89 MENOPAUSAL DEPRESSION: Status: RESOLVED | Noted: 2020-05-18 | Resolved: 2024-03-27

## 2024-03-27 PROCEDURE — 1123F ACP DISCUSS/DSCN MKR DOCD: CPT | Performed by: INTERNAL MEDICINE

## 2024-03-27 PROCEDURE — 1090F PRES/ABSN URINE INCON ASSESS: CPT | Performed by: INTERNAL MEDICINE

## 2024-03-27 PROCEDURE — 99213 OFFICE O/P EST LOW 20 MIN: CPT | Performed by: INTERNAL MEDICINE

## 2024-03-27 PROCEDURE — G8427 DOCREV CUR MEDS BY ELIG CLIN: HCPCS | Performed by: INTERNAL MEDICINE

## 2024-03-27 PROCEDURE — 3079F DIAST BP 80-89 MM HG: CPT | Performed by: INTERNAL MEDICINE

## 2024-03-27 PROCEDURE — G8484 FLU IMMUNIZE NO ADMIN: HCPCS | Performed by: INTERNAL MEDICINE

## 2024-03-27 PROCEDURE — G8399 PT W/DXA RESULTS DOCUMENT: HCPCS | Performed by: INTERNAL MEDICINE

## 2024-03-27 PROCEDURE — G0439 PPPS, SUBSEQ VISIT: HCPCS | Performed by: INTERNAL MEDICINE

## 2024-03-27 PROCEDURE — 3074F SYST BP LT 130 MM HG: CPT | Performed by: INTERNAL MEDICINE

## 2024-03-27 PROCEDURE — G8417 CALC BMI ABV UP PARAM F/U: HCPCS | Performed by: INTERNAL MEDICINE

## 2024-03-27 PROCEDURE — 1036F TOBACCO NON-USER: CPT | Performed by: INTERNAL MEDICINE

## 2024-03-27 RX ORDER — CETIRIZINE HYDROCHLORIDE 10 MG/1
10 TABLET ORAL DAILY
Qty: 30 TABLET | Refills: 3 | Status: SHIPPED | OUTPATIENT
Start: 2024-03-27

## 2024-03-27 RX ORDER — CITALOPRAM 20 MG/1
10 TABLET ORAL DAILY
Qty: 90 TABLET | Refills: 3
Start: 2024-03-27

## 2024-03-27 RX ORDER — MELOXICAM 15 MG/1
15 TABLET ORAL DAILY PRN
Qty: 90 TABLET | Refills: 1 | Status: SHIPPED | OUTPATIENT
Start: 2024-03-27

## 2024-03-27 RX ORDER — FLUTICASONE PROPIONATE 50 MCG
2 SPRAY, SUSPENSION (ML) NASAL NIGHTLY
Qty: 48 G | Refills: 3 | Status: SHIPPED | OUTPATIENT
Start: 2024-03-27

## 2024-03-27 RX ORDER — MONTELUKAST SODIUM 10 MG/1
10 TABLET ORAL DAILY
Qty: 30 TABLET | Refills: 3 | Status: SHIPPED | OUTPATIENT
Start: 2024-03-27

## 2024-03-27 SDOH — ECONOMIC STABILITY: FOOD INSECURITY: WITHIN THE PAST 12 MONTHS, THE FOOD YOU BOUGHT JUST DIDN'T LAST AND YOU DIDN'T HAVE MONEY TO GET MORE.: NEVER TRUE

## 2024-03-27 SDOH — ECONOMIC STABILITY: FOOD INSECURITY: WITHIN THE PAST 12 MONTHS, YOU WORRIED THAT YOUR FOOD WOULD RUN OUT BEFORE YOU GOT MONEY TO BUY MORE.: NEVER TRUE

## 2024-03-27 SDOH — ECONOMIC STABILITY: INCOME INSECURITY: HOW HARD IS IT FOR YOU TO PAY FOR THE VERY BASICS LIKE FOOD, HOUSING, MEDICAL CARE, AND HEATING?: NOT VERY HARD

## 2024-03-27 ASSESSMENT — PATIENT HEALTH QUESTIONNAIRE - PHQ9
10. IF YOU CHECKED OFF ANY PROBLEMS, HOW DIFFICULT HAVE THESE PROBLEMS MADE IT FOR YOU TO DO YOUR WORK, TAKE CARE OF THINGS AT HOME, OR GET ALONG WITH OTHER PEOPLE: NOT DIFFICULT AT ALL
SUM OF ALL RESPONSES TO PHQ QUESTIONS 1-9: 3
4. FEELING TIRED OR HAVING LITTLE ENERGY: SEVERAL DAYS
7. TROUBLE CONCENTRATING ON THINGS, SUCH AS READING THE NEWSPAPER OR WATCHING TELEVISION: NOT AT ALL
6. FEELING BAD ABOUT YOURSELF - OR THAT YOU ARE A FAILURE OR HAVE LET YOURSELF OR YOUR FAMILY DOWN: NOT AT ALL
SUM OF ALL RESPONSES TO PHQ QUESTIONS 1-9: 3
1. LITTLE INTEREST OR PLEASURE IN DOING THINGS: NOT AT ALL
SUM OF ALL RESPONSES TO PHQ QUESTIONS 1-9: 3
SUM OF ALL RESPONSES TO PHQ9 QUESTIONS 1 & 2: 0
3. TROUBLE FALLING OR STAYING ASLEEP: SEVERAL DAYS
9. THOUGHTS THAT YOU WOULD BE BETTER OFF DEAD, OR OF HURTING YOURSELF: NOT AT ALL
2. FEELING DOWN, DEPRESSED OR HOPELESS: NOT AT ALL
5. POOR APPETITE OR OVEREATING: SEVERAL DAYS
8. MOVING OR SPEAKING SO SLOWLY THAT OTHER PEOPLE COULD HAVE NOTICED. OR THE OPPOSITE, BEING SO FIGETY OR RESTLESS THAT YOU HAVE BEEN MOVING AROUND A LOT MORE THAN USUAL: NOT AT ALL
SUM OF ALL RESPONSES TO PHQ QUESTIONS 1-9: 3

## 2024-03-27 ASSESSMENT — LIFESTYLE VARIABLES
HOW MANY STANDARD DRINKS CONTAINING ALCOHOL DO YOU HAVE ON A TYPICAL DAY: 1 OR 2
HOW OFTEN DO YOU HAVE A DRINK CONTAINING ALCOHOL: MONTHLY OR LESS

## 2024-03-27 NOTE — PROGRESS NOTES
[facility] - PODIATRY    Today's Date: 04/02/24    Patient Name: Holli Iyer  MRN: [id number]  CSN: [id number]  PCP: Kaley Pearson MD  Referring Provider: No ref. provider found    SUBJECTIVE     Chief Complaint   Patient presents with    Follow-up     Kaley Pearson MD 03/27/2024 [facility] LEFT FOOT- pt states needs help with nail care     HPI: Holil Iyer, a 78 y.o.female, comes to clinic as a(n) established patient complaining of ingrown toenail and complaining of painful toenails. Patient has h/o GERD, Hyperthyroid, Insomnia, MVP, Sleep Apnea . Patient is non-diabetic. States that she has ongoing issues with her left 2nd toenail. The nail is curled inward on the medial border causing irritation and and occasional pain. Denies current redness or drainage. Does wear a spacer between the 1st and 2nd toes. Admits pain at 3/10 level and described as sharp. Relates previous treatment(s) including slant back, toe spacer . Denies any constitutional symptoms. No other pedal complaints at this time.    Past Medical History:   Diagnosis Date    Anemia     As a child    Asthma     Bronchial asthma    Diverticulosis     GERD (gastroesophageal reflux disease)     History of colon polyps     Hyperthyroidism     Ingrown toenail     Insomnia     Irregular heart beat     MVP (mitral valve prolapse)     Joya splints     In 40's    Sleep apnea     Stress fracture     Stroke     TIA in 1990s    TIA (transient ischemic attack)      Past Surgical History:   Procedure Laterality Date    BREAST BIOPSY Left     COLONOSCOPY  02/12/2015    Diverticulosis in the sigmoid colon; One 5mm hyperplastic polyp in the sigmoid colon; The examination was otherwise normal on direct and retroflexion views; Repeat 5 years    COLONOSCOPY N/A 04/11/2022    Diverticulosis in the left colon; One 4mm hyperplastic polyp in the rectum; The examination was otherwise normal on direct and retroflexion views; Repeat 10 years    ENDOSCOPY N/A  10/26/2017    Normal esophagus; A single gastric polyp-biopsied; Normal stomach; Normal examined duodenum     Family History   Problem Relation Age of Onset    Heart failure Mother 100    Ulcerative colitis Mother     Cancer Mother          at 102    Osteoporosis Mother          102    Cancer Father         Bladder cancer    Diabetes Brother     Colon cancer Neg Hx     Esophageal cancer Neg Hx     Colon polyps Neg Hx     Liver cancer Neg Hx     Rectal cancer Neg Hx     Stomach cancer Neg Hx     Liver disease Neg Hx      Social History     Socioeconomic History    Marital status:    Tobacco Use    Smoking status: Never    Smokeless tobacco: Never   Vaping Use    Vaping status: Never Used   Substance and Sexual Activity    Alcohol use: Yes     Alcohol/week: 1.0 standard drink of alcohol     Types: 1 Glasses of wine per week     Comment: Rarely    Drug use: Never    Sexual activity: Not Currently     Partners: Male     Birth control/protection: Post-menopausal     Allergies   Allergen Reactions    Declomycin [Demeclocycline] Palpitations    Sulfa Antibiotics Itching     As a teenager     Current Outpatient Medications   Medication Sig Dispense Refill    aspirin 81 MG EC tablet Take 1 tablet by mouth Daily.      atenolol (TENORMIN) 25 MG tablet Take 2 tablets by mouth Daily. (Patient taking differently: Take 1 tablet by mouth Daily.) 90 tablet 3    citalopram (CeleXA) 10 MG tablet TAKE 1 TABLET EVERY MORNING (Patient taking differently: Take 1 tablet by mouth Daily.) 90 tablet 0    meloxicam (MOBIC) 7.5 MG tablet Take 1 tablet by mouth Daily.      montelukast (SINGULAIR) 4 MG chewable tablet Chew 1 tablet Every Night.      triamterene-hydrochlorothiazide (DYAZIDE) 37.5-25 MG per capsule Take 1 capsule by mouth Daily.      vitamin D (ERGOCALCIFEROL) 1.25 MG (90952 UT) capsule capsule Take 1 capsule by mouth Every 7 (Seven) Days. 24 capsule 3     No current facility-administered medications for this visit.      Review of Systems   Constitutional:  Negative for chills and fever.   HENT:  Negative for congestion.    Respiratory:  Negative for shortness of breath.    Cardiovascular:  Negative for chest pain and leg swelling.   Gastrointestinal:  Negative for constipation, diarrhea, nausea and vomiting.   Musculoskeletal:  Positive for arthralgias.        Foot pain   Skin:  Negative for wound.        IGTN   Neurological:  Negative for numbness.       OBJECTIVE     Vitals:    04/02/24 0928   BP: 134/68   Pulse: 65   SpO2: 95%         PHYSICAL EXAM  GEN:   Accompanied by none.     Foot/Ankle Exam    GENERAL  Appearance:  appears stated age  Orientation:  AAOx3  Affect:  appropriate  Gait:  unimpaired  Assistance:  independent  Right shoe gear: casual shoe  Left shoe gear: casual shoe (toe spacer left great toe)    VASCULAR     Right Foot Vascularity   Dorsalis pedis:  2+  Posterior tibial:  2+  Skin temperature:  warm  Edema grading:  None  CFT:  3  Pedal hair growth:  Present  Varicosities:  mild varicosities     Left Foot Vascularity   Dorsalis pedis:  2+  Posterior tibial:  2+  Skin temperature:  warm  Edema grading:  None  CFT:  3  Pedal hair growth:  Present  Varicosities:  mild varicosities     NEUROLOGIC     Right Foot Neurologic   Normal sensation    Light touch sensation: normal  Vibratory sensation: normal  Hot/Cold sensation: normal  Protective Sensation using [place] Monofilament:   Sites intact: 10  Sites tested: 10     Left Foot Neurologic   Normal sensation    Light touch sensation: normal  Vibratory sensation: normal  Hot/Cold sensation:  normal  Protective Sensation using [place] Monofilament:   Sites intact: 10  Sites tested: 10    MUSCULOSKELETAL     Right Foot Musculoskeletal   Ecchymosis:  none  Tenderness:  none    Arch:  Normal  Hammertoe:  Second toe  Hallux valgus: Yes (Medial bony eminence with mild abduction of hallux. Somewhat reducible. )       Left Foot Musculoskeletal    Ecchymosis:  none  Tenderness:  toe 2 tenderness and toenail problem  Arch:  Normal  Hammertoe:  Second toe  Hallux valgus: Yes (Medial bony eminence with mild abduction of hallux. Somewhat reducible.)      MUSCLE STRENGTH     Right Foot Muscle Strength   Foot dorsiflexion:  5  Foot plantar flexion:  5  Foot inversion:  5  Foot eversion:  5     Left Foot Muscle Strength   Foot dorsiflexion:  5  Foot plantar flexion:  5  Foot inversion:  5  Foot eversion:  5    RANGE OF MOTION     Right Foot Range of Motion   Foot and ankle ROM within normal limits       Left Foot Range of Motion   Foot and ankle ROM within normal limits      DERMATOLOGIC      Right Foot Dermatologic   Skin  Right foot skin is intact.   Nails  1.  Positive for elongated.  2.  Positive for elongated and abnormal thickness.  3.  Positive for elongated.  4.  Positive for elongated and abnormal thickness.  5.  Positive for elongated and abnormal thickness.     Left Foot Dermatologic   Skin  Left foot skin is intact. Negative for skin changes.   Nails  1.  Positive for elongated and abnormal thickness.  2.  Positive for elongated, abnormal thickness and dystrophic nail (incurvated medial border).  3.  Positive for elongated and abnormal thickness.  4.  Positive for elongated and abnormally thick.  5.  Positive for elongated and abnormally thick.      RADIOLOGY/NUCLEAR:  No results found.    LABORATORY/CULTURE RESULTS:      PATHOLOGY RESULTS:       ASSESSMENT/PLAN     Diagnoses and all orders for this visit:    1. Dystrophic nail (Primary)    2. Toe pain, left    3. Ingrown toenail    4. Hallux valgus, left    5. Hammer toe of left foot        Comprehensive lower extremity examination and evaluation was performed.  Discussed findings and treatment plan including risks, benefits, and treatment options with patient in detail. Patient agreed with treatment plan.  After verbal consent obtained, nail(s) x1 debrided of offending borders with nail nipper  without incidence  Patient may maintain nails and calluses at home utilizing emery board or pumice stone between visits as needed   May benefit from PNA if IGTNs continue to be problematic  Remain conservative in regard to bony deformities of the feet. Discussed again that these could be contributing to recurrent issues with ingrowing toenails. Continue toe spacer.   An After Visit Summary was printed and given to the patient at discharge, including (if requested) any available informative/educational handouts regarding diagnosis, treatment, or medications. All questions were answered to patient/family satisfaction. Should symptoms fail to improve or worsen they agree to call or return to clinic or to go to the Emergency Department. Discussed the importance of following up with any needed screening tests/labs/specialist appointments and any requested follow-up recommended by me today. Importance of maintaining follow-up discussed and patient accepts that missed appointments can delay diagnosis and potentially lead to worsening of conditions.  Return if symptoms worsen or fail to improve., or sooner if acute issues arise.        This document has been electronically signed by Shorty Raymond DPM on April 2, 2024 09:53 CDT

## 2024-03-27 NOTE — PROGRESS NOTES
Chief Complaint   Patient presents with    Medicare AWV     C/o shortness of breath on exertion       HPI: Patient is here today for Medicare annual wellness visit and to follow-up medical problems which include hypertension hypothyroidism osteoarthritis asthma other issues.  Patient's blood pressure has been doing okay heart rate is doing okay.  She needed cardiac clearance because of a history of mitral valve prolapse reviewed her echo no significant valvular pathology seen but we have made that referral to cardiology she has had some intermittent cough fatigue 107-115/80's and Hr 65-75.  Coughing post eating.  Mood is better some reactive depression anxiety better.  Frustration with her weight.    Past Medical History:   Diagnosis Date    Anxiety     Asthma     GERD (gastroesophageal reflux disease)     Hypertension     Hyperthyroidism     Obesity     Osteoarthritis     Sleep apnea     Vitamin D deficiency        Past Surgical History:   Procedure Laterality Date    BREAST BIOPSY Right 1980    b9    CATARACT REMOVAL      EYE SURGERY         Family History   Problem Relation Age of Onset    Heart Disease Mother         Had a pacemaker in her 90s    Cancer Father         bladder    Diabetes Brother     Breast Cancer Maternal Aunt        Social History     Socioeconomic History    Marital status:      Spouse name: Not on file    Number of children: Not on file    Years of education: Not on file    Highest education level: Not on file   Occupational History    Not on file   Tobacco Use    Smoking status: Never    Smokeless tobacco: Never   Substance and Sexual Activity    Alcohol use: Never    Drug use: Never    Sexual activity: Not Currently     Partners: Male   Other Topics Concern    Not on file   Social History Narrative    Not on file     Social Determinants of Health     Financial Resource Strain: Low Risk  (3/27/2024)    Overall Financial Resource Strain (CARDIA)     Difficulty of Paying Living

## 2024-04-01 ENCOUNTER — TELEPHONE (OUTPATIENT)
Dept: PODIATRY | Facility: CLINIC | Age: 79
End: 2024-04-01
Payer: MEDICARE

## 2024-04-01 NOTE — TELEPHONE ENCOUNTER
Called patient regarding appt on 004/02/2024. Left message for patient to return call if any questions or concerns arise.

## 2024-04-02 ENCOUNTER — OFFICE VISIT (OUTPATIENT)
Dept: PODIATRY | Facility: CLINIC | Age: 79
End: 2024-04-02
Payer: MEDICARE

## 2024-04-02 VITALS
HEART RATE: 65 BPM | DIASTOLIC BLOOD PRESSURE: 68 MMHG | BODY MASS INDEX: 33.27 KG/M2 | HEIGHT: 67 IN | SYSTOLIC BLOOD PRESSURE: 134 MMHG | WEIGHT: 212 LBS | OXYGEN SATURATION: 95 %

## 2024-04-02 DIAGNOSIS — L60.3 DYSTROPHIC NAIL: Primary | ICD-10-CM

## 2024-04-02 DIAGNOSIS — M79.675 TOE PAIN, LEFT: ICD-10-CM

## 2024-04-02 DIAGNOSIS — L60.0 INGROWN TOENAIL: ICD-10-CM

## 2024-04-02 DIAGNOSIS — M20.42 HAMMER TOE OF LEFT FOOT: ICD-10-CM

## 2024-04-02 DIAGNOSIS — M20.12 HALLUX VALGUS, LEFT: ICD-10-CM

## 2024-04-02 PROCEDURE — 3078F DIAST BP <80 MM HG: CPT | Performed by: PODIATRIST

## 2024-04-02 PROCEDURE — 11720 DEBRIDE NAIL 1-5: CPT | Performed by: PODIATRIST

## 2024-04-02 PROCEDURE — 3075F SYST BP GE 130 - 139MM HG: CPT | Performed by: PODIATRIST

## 2024-04-02 PROCEDURE — 1160F RVW MEDS BY RX/DR IN RCRD: CPT | Performed by: PODIATRIST

## 2024-04-02 PROCEDURE — 99213 OFFICE O/P EST LOW 20 MIN: CPT | Performed by: PODIATRIST

## 2024-04-02 PROCEDURE — 1159F MED LIST DOCD IN RCRD: CPT | Performed by: PODIATRIST

## 2024-04-02 RX ORDER — MELOXICAM 7.5 MG/1
7.5 TABLET ORAL DAILY
COMMUNITY

## 2024-04-02 RX ORDER — MONTELUKAST SODIUM 4 MG/1
4 TABLET, CHEWABLE ORAL NIGHTLY
COMMUNITY

## 2024-04-09 ASSESSMENT — ENCOUNTER SYMPTOMS
BACK PAIN: 0
COUGH: 1
ABDOMINAL PAIN: 0
SHORTNESS OF BREATH: 1
EYE REDNESS: 0
EYE DISCHARGE: 0

## 2024-04-15 ENCOUNTER — HOSPITAL ENCOUNTER (OUTPATIENT)
Dept: WOMENS IMAGING | Age: 79
Discharge: HOME | End: 2024-04-15
Payer: MEDICARE

## 2024-04-15 DIAGNOSIS — Z12.31 ENCOUNTER FOR SCREENING MAMMOGRAM FOR MALIGNANT NEOPLASM OF BREAST: Primary | ICD-10-CM

## 2024-04-15 DIAGNOSIS — Z12.31 ENCOUNTER FOR SCREENING MAMMOGRAM FOR MALIGNANT NEOPLASM OF BREAST: ICD-10-CM

## 2024-04-15 PROCEDURE — 77063 BREAST TOMOSYNTHESIS BI: CPT

## 2024-04-16 DIAGNOSIS — R92.8 ABNORMAL MAMMOGRAM OF RIGHT BREAST: Primary | ICD-10-CM

## 2024-04-16 DIAGNOSIS — F32.9 REACTIVE DEPRESSION: ICD-10-CM

## 2024-04-16 RX ORDER — CITALOPRAM 20 MG/1
10 TABLET ORAL DAILY
Qty: 90 TABLET | Refills: 3 | Status: SHIPPED | OUTPATIENT
Start: 2024-04-16

## 2024-04-19 ENCOUNTER — HOSPITAL ENCOUNTER (OUTPATIENT)
Dept: WOMENS IMAGING | Age: 79
Discharge: HOME | End: 2024-04-19
Payer: MEDICARE

## 2024-04-19 DIAGNOSIS — R92.8 ABNORMAL MAMMOGRAM OF RIGHT BREAST: ICD-10-CM

## 2024-04-19 PROCEDURE — G0279 TOMOSYNTHESIS, MAMMO: HCPCS

## 2024-04-22 ENCOUNTER — OFFICE VISIT (OUTPATIENT)
Dept: CARDIOLOGY | Facility: CLINIC | Age: 79
End: 2024-04-22
Payer: MEDICARE

## 2024-04-22 VITALS
BODY MASS INDEX: 32.96 KG/M2 | WEIGHT: 210 LBS | OXYGEN SATURATION: 98 % | HEART RATE: 62 BPM | RESPIRATION RATE: 18 BRPM | SYSTOLIC BLOOD PRESSURE: 114 MMHG | DIASTOLIC BLOOD PRESSURE: 62 MMHG | HEIGHT: 67 IN

## 2024-04-22 DIAGNOSIS — Z01.818 PREOPERATIVE EVALUATION TO RULE OUT SURGICAL CONTRAINDICATION: ICD-10-CM

## 2024-04-22 DIAGNOSIS — I10 BENIGN HYPERTENSION: ICD-10-CM

## 2024-04-22 DIAGNOSIS — I34.1 MITRAL VALVE PROLAPSE: Primary | ICD-10-CM

## 2024-04-22 PROCEDURE — 1160F RVW MEDS BY RX/DR IN RCRD: CPT | Performed by: INTERNAL MEDICINE

## 2024-04-22 PROCEDURE — 3074F SYST BP LT 130 MM HG: CPT | Performed by: INTERNAL MEDICINE

## 2024-04-22 PROCEDURE — 93000 ELECTROCARDIOGRAM COMPLETE: CPT | Performed by: INTERNAL MEDICINE

## 2024-04-22 PROCEDURE — 1159F MED LIST DOCD IN RCRD: CPT | Performed by: INTERNAL MEDICINE

## 2024-04-22 PROCEDURE — 3078F DIAST BP <80 MM HG: CPT | Performed by: INTERNAL MEDICINE

## 2024-04-22 PROCEDURE — 99204 OFFICE O/P NEW MOD 45 MIN: CPT | Performed by: INTERNAL MEDICINE

## 2024-04-22 NOTE — LETTER
April 22, 2024     Kaley Pearson MD  [address] [place] [address] [address]  [place] [address]    Patient: Holli Iyer   YOB: 1945   Date of Visit: 4/22/2024       Dear Kaley Pearson MD,    Thank you for referring Holli Iyer to me for evaluation. Below is a copy of my consult note.    If you have questions, please do not hesitate to call me. I look forward to following Holli along with you.         Sincerely,        Edwar Gonzalez MD        CC: Manjinder Latif MD      Reason for Visit: History of mitral valve prolapse.    HPI:  Holli Iyer is a 78 y.o. female is being seen for consultation today at the request of Kaley Pearson MD secondary to history of mitral valve prolapse and preoperative risk stratification.  She had normal cardiac structure and function on echo from 10/12/2023 with no evidence of mitral valve prolapse.  She had a stress echo on 10/24/2023 that was low risk.    She is getting ready to have her eyebrow's lifted with Dr. Latif in [place].  She previously followed Dr Prescott, but has not been seen in a long time.  The mitral valve prolapse was diagnosed about 40 years ago to the best of her knowledge.  She was started on atenolol and has not had any issues.  She denies any chest  pain, palpitations, dizziness, syncope, PND, or orthopnea.  She is active and works in craft business.  She has no difficulty with any of her activities of daily living.    Previous Cardiac Testing and Procedures:  -Echo (10/12/2023) EF 55-60%, mild LVH, normal diastolic function for age, normal RV size and function, normal LA and RA, structurally normal mitral valve  -Stress echo (10/24/2023) below average functional capacity, unable to reach target heart rate, normal hemodynamic response, normal wall motion, overall low risk for ischemia    Lab data:  -proBNP (10/6/2023) 179, normal 0-449  -Lipid panel (3/5/2024) total cholesterol 179, HDL 43, , triglycerides 142  -BMP (3/5/2024) creatinine 0.7,  potassium 3.9, sodium 140    Patient Active Problem List   Diagnosis   • Gastroesophageal reflux disease without esophagitis   • Esophageal spasm   • Benign hypertension   • GILL (obstructive sleep apnea)   • Vitamin D deficiency   • Arthritis of knee   • Benign neoplasm of ovary   • Breast cyst, right   • Closed fracture of lumbar vertebra without spinal cord injury   • Degeneration of cervicothoracic intervertebral disc   • Gluten intolerance   • Insomnia   • Menopausal depression   • Midline cystocele   • Mitral valve prolapse   • Cervicalgia   • Postmenopausal status   • Proteinuria   • Rectocele   • Uterine prolapse   • Benign paroxysmal positional vertigo   • Hyperplastic rectal polyp   • Colon cancer screening       Social History     Tobacco Use   • Smoking status: Never   • Smokeless tobacco: Never   Vaping Use   • Vaping status: Never Used   Substance Use Topics   • Alcohol use: Yes     Alcohol/week: 1.0 standard drink of alcohol     Types: 1 Glasses of wine per week     Comment: Rarely   • Drug use: Never       Family History   Problem Relation Age of Onset   • Heart failure Mother 100   • Ulcerative colitis Mother    • Cancer Mother          at 102   • Osteoporosis Mother          102   • Cancer Father         Bladder cancer   • Diabetes Brother    • Colon cancer Neg Hx    • Esophageal cancer Neg Hx    • Colon polyps Neg Hx    • Liver cancer Neg Hx    • Rectal cancer Neg Hx    • Stomach cancer Neg Hx    • Liver disease Neg Hx        The following portions of the patient's history were reviewed and updated as appropriate: allergies, current medications, past family history, past medical history, past social history, past surgical history, and problem list.      Current Outpatient Medications:   •  atenolol (TENORMIN) 25 MG tablet, Take 2 tablets by mouth Daily. (Patient taking differently: Take 1 tablet by mouth Daily.), Disp: 90 tablet, Rfl: 3  •  citalopram (CeleXA) 10 MG tablet, TAKE 1 TABLET  "EVERY MORNING (Patient taking differently: Take 1 tablet by mouth Daily.), Disp: 90 tablet, Rfl: 0  •  meloxicam (MOBIC) 7.5 MG tablet, Take 1 tablet by mouth Daily., Disp: , Rfl:   •  montelukast (SINGULAIR) 4 MG chewable tablet, Chew 1 tablet Every Night., Disp: , Rfl:   •  triamterene-hydrochlorothiazide (DYAZIDE) 37.5-25 MG per capsule, Take 1 capsule by mouth Daily., Disp: , Rfl:   •  vitamin D (ERGOCALCIFEROL) 1.25 MG (27951 UT) capsule capsule, Take 1 capsule by mouth Every 7 (Seven) Days., Disp: 24 capsule, Rfl: 3  •  aspirin 81 MG EC tablet, Take 1 tablet by mouth Daily. (Patient not taking: Reported on 4/22/2024), Disp: , Rfl:     Review of Systems   Constitutional: Negative for chills and fever.   Eyes:  Positive for visual disturbance.   Cardiovascular:  Negative for chest pain and paroxysmal nocturnal dyspnea.   Respiratory:  Negative for cough and shortness of breath.    Skin:  Negative for rash.   Gastrointestinal:  Negative for abdominal pain and heartburn.   Neurological:  Negative for dizziness and numbness.       Objective  /62   Pulse 62   Resp 18   Ht 170.2 cm (67\")   Wt 95.3 kg (210 lb)   SpO2 98%   BMI 32.89 kg/m²   Constitutional:       Appearance: Well-developed.   HENT:      Head: Normocephalic and atraumatic.   Pulmonary:      Effort: Pulmonary effort is normal.      Breath sounds: Normal breath sounds.   Cardiovascular:      Normal rate. Regular rhythm.   Edema:     Peripheral edema absent.   Skin:     General: Skin is warm and dry.   Neurological:      Mental Status: Alert and oriented to person, place, and time.         ECG 12 Lead    Date/Time: 4/22/2024 11:28 AM  Performed by: Edwar Gonzalez MD    Authorized by: Edwar Gonzalez MD  Previous ECG: no previous ECG available  Rhythm: sinus rhythm  Rate: normal  Other findings: non-specific ST-T wave changes            ICD-10-CM ICD-9-CM   1. Mitral valve prolapse  I34.1 424.0   2. Benign hypertension  I10 401.1   3. " Preoperative evaluation to rule out surgical contraindication  Z01.818 V72.83         Assessment/Plan:  1.  Mitral valve prolapse: Reportedly diagnosed years ago.  Structurally normal mitral valve on echo from 10/12/2023.  Will review echo images on the disc today.    2.  Essential hypertension: Blood pressure is well-controlled.  Continue atenolol, triamterene, and HCTZ.    3.  Preoperative evaluation: Patient is a low risk surgical candidate from a cardiac standpoint.  He has a functional capacity of greater than 4 METS.  She had normal biventricular systolic function on echo from 10/12/2023 and a low risk stress echo on 10/24/2023.  No further cardiac testing is indicated prior to surgery.

## 2024-04-22 NOTE — PROGRESS NOTES
Reason for Visit: History of mitral valve prolapse.    HPI:  Holli Iyer is a 78 y.o. female is being seen for consultation today at the request of Kaley Pearson MD secondary to history of mitral valve prolapse and preoperative risk stratification.  She had normal cardiac structure and function on echo from 10/12/2023 with no evidence of mitral valve prolapse.  She had a stress echo on 10/24/2023 that was low risk.    She is getting ready to have her eyebrow's lifted with Dr. Latif in [place].  She previously followed Dr Prescott, but has not been seen in a long time.  The mitral valve prolapse was diagnosed about 40 years ago to the best of her knowledge.  She was started on atenolol and has not had any issues.  She denies any chest  pain, palpitations, dizziness, syncope, PND, or orthopnea.  She is active and works in craft business.  She has no difficulty with any of her activities of daily living.    Previous Cardiac Testing and Procedures:  -Echo (10/12/2023) EF 55-60%, mild LVH, normal diastolic function for age, normal RV size and function, normal LA and RA, structurally normal mitral valve  -Stress echo (10/24/2023) below average functional capacity, unable to reach target heart rate, normal hemodynamic response, normal wall motion, overall low risk for ischemia    Lab data:  -proBNP (10/6/2023) 179, normal 0-449  -Lipid panel (3/5/2024) total cholesterol 179, HDL 43, , triglycerides 142  -BMP (3/5/2024) creatinine 0.7, potassium 3.9, sodium 140    Patient Active Problem List   Diagnosis    Gastroesophageal reflux disease without esophagitis    Esophageal spasm    Benign hypertension    GILL (obstructive sleep apnea)    Vitamin D deficiency    Arthritis of knee    Benign neoplasm of ovary    Breast cyst, right    Closed fracture of lumbar vertebra without spinal cord injury    Degeneration of cervicothoracic intervertebral disc    Gluten intolerance    Insomnia    Menopausal depression     Midline cystocele    Mitral valve prolapse    Cervicalgia    Postmenopausal status    Proteinuria    Rectocele    Uterine prolapse    Benign paroxysmal positional vertigo    Hyperplastic rectal polyp    Colon cancer screening       Social History     Tobacco Use    Smoking status: Never    Smokeless tobacco: Never   Vaping Use    Vaping status: Never Used   Substance Use Topics    Alcohol use: Yes     Alcohol/week: 1.0 standard drink of alcohol     Types: 1 Glasses of wine per week     Comment: Rarely    Drug use: Never       Family History   Problem Relation Age of Onset    Heart failure Mother 100    Ulcerative colitis Mother     Cancer Mother          at 102    Osteoporosis Mother          102    Cancer Father         Bladder cancer    Diabetes Brother     Colon cancer Neg Hx     Esophageal cancer Neg Hx     Colon polyps Neg Hx     Liver cancer Neg Hx     Rectal cancer Neg Hx     Stomach cancer Neg Hx     Liver disease Neg Hx        The following portions of the patient's history were reviewed and updated as appropriate: allergies, current medications, past family history, past medical history, past social history, past surgical history, and problem list.      Current Outpatient Medications:     atenolol (TENORMIN) 25 MG tablet, Take 2 tablets by mouth Daily. (Patient taking differently: Take 1 tablet by mouth Daily.), Disp: 90 tablet, Rfl: 3    citalopram (CeleXA) 10 MG tablet, TAKE 1 TABLET EVERY MORNING (Patient taking differently: Take 1 tablet by mouth Daily.), Disp: 90 tablet, Rfl: 0    meloxicam (MOBIC) 7.5 MG tablet, Take 1 tablet by mouth Daily., Disp: , Rfl:     montelukast (SINGULAIR) 4 MG chewable tablet, Chew 1 tablet Every Night., Disp: , Rfl:     triamterene-hydrochlorothiazide (DYAZIDE) 37.5-25 MG per capsule, Take 1 capsule by mouth Daily., Disp: , Rfl:     vitamin D (ERGOCALCIFEROL) 1.25 MG (53830 UT) capsule capsule, Take 1 capsule by mouth Every 7 (Seven) Days., Disp: 24 capsule, Rfl:  "3    aspirin 81 MG EC tablet, Take 1 tablet by mouth Daily. (Patient not taking: Reported on 4/22/2024), Disp: , Rfl:     Review of Systems   Constitutional: Negative for chills and fever.   Eyes:  Positive for visual disturbance.   Cardiovascular:  Negative for chest pain and paroxysmal nocturnal dyspnea.   Respiratory:  Negative for cough and shortness of breath.    Skin:  Negative for rash.   Gastrointestinal:  Negative for abdominal pain and heartburn.   Neurological:  Negative for dizziness and numbness.       Objective   /62   Pulse 62   Resp 18   Ht 170.2 cm (67\")   Wt 95.3 kg (210 lb)   SpO2 98%   BMI 32.89 kg/m²   Constitutional:       Appearance: Well-developed.   HENT:      Head: Normocephalic and atraumatic.   Pulmonary:      Effort: Pulmonary effort is normal.      Breath sounds: Normal breath sounds.   Cardiovascular:      Normal rate. Regular rhythm.   Edema:     Peripheral edema absent.   Skin:     General: Skin is warm and dry.   Neurological:      Mental Status: Alert and oriented to person, place, and time.         ECG 12 Lead    Date/Time: 4/22/2024 11:28 AM  Performed by: Edwar Gonzalez MD    Authorized by: Edwar Gonzalez MD  Previous ECG: no previous ECG available  Rhythm: sinus rhythm  Rate: normal  Other findings: non-specific ST-T wave changes            ICD-10-CM ICD-9-CM   1. Mitral valve prolapse  I34.1 424.0   2. Benign hypertension  I10 401.1   3. Preoperative evaluation to rule out surgical contraindication  Z01.818 V72.83         Assessment/Plan:  1.  Mitral valve prolapse: Reportedly diagnosed years ago.  Structurally normal mitral valve on echo from 10/12/2023.  Will review echo images on the disc today.    2.  Essential hypertension: Blood pressure is well-controlled.  Continue atenolol, triamterene, and HCTZ.    3.  Preoperative evaluation: Patient is a low risk surgical candidate from a cardiac standpoint.  He has a functional capacity of greater than 4 METS.  She " had normal biventricular systolic function on echo from 10/12/2023 and a low risk stress echo on 10/24/2023.  No further cardiac testing is indicated prior to surgery.

## 2024-04-23 ENCOUNTER — HOSPITAL ENCOUNTER (OUTPATIENT)
Dept: CT IMAGING | Age: 79
Discharge: HOME | End: 2024-04-23
Payer: MEDICARE

## 2024-04-23 DIAGNOSIS — N63.0 BREAST NODULE: ICD-10-CM

## 2024-04-23 DIAGNOSIS — R09.81 CHRONIC NASAL CONGESTION: ICD-10-CM

## 2024-04-23 DIAGNOSIS — R92.8 ABNORMAL MAMMOGRAM OF RIGHT BREAST: Primary | ICD-10-CM

## 2024-04-23 PROCEDURE — 70486 CT MAXILLOFACIAL W/O DYE: CPT

## 2024-04-30 ENCOUNTER — HOSPITAL ENCOUNTER (OUTPATIENT)
Dept: WOMENS IMAGING | Age: 79
Discharge: HOME | End: 2024-04-30
Payer: MEDICARE

## 2024-04-30 DIAGNOSIS — R92.8 ABNORMAL MAMMOGRAM OF RIGHT BREAST: ICD-10-CM

## 2024-04-30 PROCEDURE — 77065 DX MAMMO INCL CAD UNI: CPT

## 2024-04-30 PROCEDURE — 88305 TISSUE EXAM BY PATHOLOGIST: CPT

## 2024-04-30 PROCEDURE — 2720000010 MAM STEREO BREAST BX W LOC DEVICE 1ST LESION RIGHT

## 2024-05-16 ENCOUNTER — OFFICE VISIT (OUTPATIENT)
Dept: ENT CLINIC | Age: 79
End: 2024-05-16
Payer: MEDICARE

## 2024-05-16 VITALS
WEIGHT: 204 LBS | DIASTOLIC BLOOD PRESSURE: 76 MMHG | HEIGHT: 67 IN | SYSTOLIC BLOOD PRESSURE: 122 MMHG | BODY MASS INDEX: 32.02 KG/M2

## 2024-05-16 DIAGNOSIS — Q18.1 CYST OF EAR CANAL: ICD-10-CM

## 2024-05-16 DIAGNOSIS — Z91.09 ENVIRONMENTAL ALLERGIES: Primary | ICD-10-CM

## 2024-05-16 PROCEDURE — 1090F PRES/ABSN URINE INCON ASSESS: CPT | Performed by: OTOLARYNGOLOGY

## 2024-05-16 PROCEDURE — G8399 PT W/DXA RESULTS DOCUMENT: HCPCS | Performed by: OTOLARYNGOLOGY

## 2024-05-16 PROCEDURE — 99213 OFFICE O/P EST LOW 20 MIN: CPT | Performed by: OTOLARYNGOLOGY

## 2024-05-16 PROCEDURE — 92504 EAR MICROSCOPY EXAMINATION: CPT | Performed by: OTOLARYNGOLOGY

## 2024-05-16 PROCEDURE — G8427 DOCREV CUR MEDS BY ELIG CLIN: HCPCS | Performed by: OTOLARYNGOLOGY

## 2024-05-16 PROCEDURE — G8417 CALC BMI ABV UP PARAM F/U: HCPCS | Performed by: OTOLARYNGOLOGY

## 2024-05-16 PROCEDURE — 1036F TOBACCO NON-USER: CPT | Performed by: OTOLARYNGOLOGY

## 2024-05-16 PROCEDURE — 3074F SYST BP LT 130 MM HG: CPT | Performed by: OTOLARYNGOLOGY

## 2024-05-16 PROCEDURE — 1123F ACP DISCUSS/DSCN MKR DOCD: CPT | Performed by: OTOLARYNGOLOGY

## 2024-05-16 PROCEDURE — 3078F DIAST BP <80 MM HG: CPT | Performed by: OTOLARYNGOLOGY

## 2024-05-16 ASSESSMENT — ENCOUNTER SYMPTOMS
EYES NEGATIVE: 1
GASTROINTESTINAL NEGATIVE: 1
ALLERGIC/IMMUNOLOGIC NEGATIVE: 1

## 2024-05-16 NOTE — PROGRESS NOTES
2024    Mel Damon (:  1945) is a 79 y.o. female, Established patient, here for evaluation of the following chief complaint(s):  Follow-up (Nasal congestion)      Vitals:    24 0824   BP: 122/76   Weight: 92.5 kg (204 lb)   Height: 1.702 m (5' 7\")       Wt Readings from Last 3 Encounters:   24 92.5 kg (204 lb)   24 99.3 kg (219 lb)   24 99.3 kg (219 lb)       BP Readings from Last 3 Encounters:   24 122/76   24 124/80   24 122/76         SUBJECTIVE/OBJECTIVE:    Patient seen today for her turbinates.  Back in March I ordered a CT scan which showed significant turbinate hypertrophy and she has difficulty tolerating her CPAP due to nasal congestion when she laid down.  Another provider placed her on Singulair she says that helps her greatly.  She is known to have difficulty breathing through her nose.  She also complains of a small bump in her right ear canal.  She notices a few weeks ago.        Review of Systems   Constitutional: Negative.    HENT: Negative.     Eyes: Negative.    Respiratory: Negative.     Cardiovascular: Negative.    Gastrointestinal: Negative.    Endocrine: Negative.    Musculoskeletal: Negative.    Skin: Negative.    Allergic/Immunologic: Negative.    Neurological: Negative.    Hematological: Negative.    Psychiatric/Behavioral: Negative.          Physical Exam  Vitals reviewed.   Constitutional:       Appearance: Normal appearance. She is normal weight.   HENT:      Head: Normocephalic and atraumatic.      Right Ear: Tympanic membrane, ear canal and external ear normal.      Left Ear: Tympanic membrane, ear canal and external ear normal.      Ears:        Comments: Small benign cyst in right ear canal     Nose: Nose normal.      Mouth/Throat:      Mouth: Mucous membranes are moist.      Pharynx: Oropharynx is clear.   Eyes:      Extraocular Movements: Extraocular movements intact.      Pupils: Pupils are equal, round, and reactive to

## 2024-06-11 ENCOUNTER — OFFICE VISIT (OUTPATIENT)
Dept: INTERNAL MEDICINE | Age: 79
End: 2024-06-11
Payer: MEDICARE

## 2024-06-11 VITALS
WEIGHT: 197 LBS | HEIGHT: 67 IN | SYSTOLIC BLOOD PRESSURE: 122 MMHG | DIASTOLIC BLOOD PRESSURE: 68 MMHG | OXYGEN SATURATION: 95 % | BODY MASS INDEX: 30.92 KG/M2 | HEART RATE: 69 BPM

## 2024-06-11 DIAGNOSIS — R92.8 ABNORMAL MAMMOGRAM: ICD-10-CM

## 2024-06-11 DIAGNOSIS — J45.20 MILD INTERMITTENT ASTHMA WITHOUT COMPLICATION: ICD-10-CM

## 2024-06-11 DIAGNOSIS — I10 PRIMARY HYPERTENSION: Primary | ICD-10-CM

## 2024-06-11 DIAGNOSIS — M70.52 PES ANSERINUS BURSITIS OF BOTH KNEES: ICD-10-CM

## 2024-06-11 DIAGNOSIS — M70.51 PES ANSERINUS BURSITIS OF BOTH KNEES: ICD-10-CM

## 2024-06-11 DIAGNOSIS — F41.9 ANXIETY: ICD-10-CM

## 2024-06-11 PROCEDURE — G8417 CALC BMI ABV UP PARAM F/U: HCPCS | Performed by: INTERNAL MEDICINE

## 2024-06-11 PROCEDURE — G8399 PT W/DXA RESULTS DOCUMENT: HCPCS | Performed by: INTERNAL MEDICINE

## 2024-06-11 PROCEDURE — 3078F DIAST BP <80 MM HG: CPT | Performed by: INTERNAL MEDICINE

## 2024-06-11 PROCEDURE — 3074F SYST BP LT 130 MM HG: CPT | Performed by: INTERNAL MEDICINE

## 2024-06-11 PROCEDURE — 1036F TOBACCO NON-USER: CPT | Performed by: INTERNAL MEDICINE

## 2024-06-11 PROCEDURE — G8427 DOCREV CUR MEDS BY ELIG CLIN: HCPCS | Performed by: INTERNAL MEDICINE

## 2024-06-11 PROCEDURE — 1123F ACP DISCUSS/DSCN MKR DOCD: CPT | Performed by: INTERNAL MEDICINE

## 2024-06-11 PROCEDURE — 1090F PRES/ABSN URINE INCON ASSESS: CPT | Performed by: INTERNAL MEDICINE

## 2024-06-11 PROCEDURE — 99214 OFFICE O/P EST MOD 30 MIN: CPT | Performed by: INTERNAL MEDICINE

## 2024-06-11 RX ORDER — TRIAMTERENE AND HYDROCHLOROTHIAZIDE 37.5; 25 MG/1; MG/1
1 CAPSULE ORAL DAILY
COMMUNITY
Start: 2023-12-27

## 2024-06-11 NOTE — PROGRESS NOTES
Chief Complaint   Patient presents with    Follow-up     2 months    Hypertension       HPI: Patient is here today to follow-up hypertension asthma anxiety other issues she does seem to be feeling better she has been very active helping a friend move.  No recent flareups of asthma.  Blood pressure doing okay she is lost some weight she feels better.    Past Medical History:   Diagnosis Date    Anxiety     Asthma     GERD (gastroesophageal reflux disease)     Hypertension     Hyperthyroidism     Obesity     Osteoarthritis     Sleep apnea     Vitamin D deficiency        Past Surgical History:   Procedure Laterality Date    BREAST BIOPSY Right 1980    b9    CATARACT REMOVAL      EYE SURGERY      [facility] BREAST BIOPSY RIGHT Right 4/30/2024    [facility] LOC BREAST BIOPSY RIGHT 4/30/2024 [facility]       Family History   Problem Relation Age of Onset    Heart Disease Mother         Had a pacemaker in her 90s    Cancer Father         bladder    Diabetes Brother     Breast Cancer Maternal Aunt        Social History     Socioeconomic History    Marital status:      Spouse name: Not on file    Number of children: Not on file    Years of education: Not on file    Highest education level: Not on file   Occupational History    Not on file   Tobacco Use    Smoking status: Never    Smokeless tobacco: Never   Substance and Sexual Activity    Alcohol use: Never    Drug use: Never    Sexual activity: Not Currently     Partners: Male   Other Topics Concern    Not on file   Social History Narrative    Not on file     Social Determinants of Health     Financial Resource Strain: Low Risk  (3/27/2024)    Overall Financial Resource Strain (CARDIA)     Difficulty of Paying Living Expenses: Not very hard   Food Insecurity: No Food Insecurity (3/27/2024)    Hunger Vital Sign     Worried About Running Out of Food in the Last Year: Never true     Ran Out of Food in the Last Year: Never true   Transportation

## 2024-06-24 PROBLEM — R60.0 LOCALIZED EDEMA: Status: RESOLVED | Noted: 2023-11-08 | Resolved: 2024-06-24

## 2024-06-24 PROBLEM — R80.9 PROTEINURIA: Status: RESOLVED | Noted: 2020-05-18 | Resolved: 2024-06-24

## 2024-06-24 PROBLEM — J45.20 MILD INTERMITTENT ASTHMA WITHOUT COMPLICATION: Status: ACTIVE | Noted: 2024-06-24

## 2024-07-03 DIAGNOSIS — E55.9 VITAMIN D DEFICIENCY: ICD-10-CM

## 2024-07-03 RX ORDER — ERGOCALCIFEROL 1.25 MG/1
50000 CAPSULE ORAL WEEKLY
Qty: 12 CAPSULE | Refills: 3 | Status: SHIPPED | OUTPATIENT
Start: 2024-07-03

## 2024-07-11 ENCOUNTER — APPOINTMENT (OUTPATIENT)
Dept: CT IMAGING | Facility: HOSPITAL | Age: 79
End: 2024-07-11
Payer: MEDICARE

## 2024-07-11 ENCOUNTER — APPOINTMENT (OUTPATIENT)
Dept: GENERAL RADIOLOGY | Facility: HOSPITAL | Age: 79
End: 2024-07-11
Payer: MEDICARE

## 2024-07-11 ENCOUNTER — HOSPITAL ENCOUNTER (OUTPATIENT)
Facility: HOSPITAL | Age: 79
Setting detail: OBSERVATION
Discharge: HOME | End: 2024-07-13
Attending: FAMILY MEDICINE | Admitting: INTERNAL MEDICINE
Payer: MEDICARE

## 2024-07-11 ENCOUNTER — OFFICE VISIT (OUTPATIENT)
Dept: INTERNAL MEDICINE | Age: 79
End: 2024-07-11
Payer: MEDICARE

## 2024-07-11 VITALS
SYSTOLIC BLOOD PRESSURE: 128 MMHG | OXYGEN SATURATION: 92 % | BODY MASS INDEX: 30.85 KG/M2 | HEART RATE: 95 BPM | WEIGHT: 197 LBS | TEMPERATURE: 98.2 F | DIASTOLIC BLOOD PRESSURE: 74 MMHG

## 2024-07-11 DIAGNOSIS — R09.1 PLEURISY: ICD-10-CM

## 2024-07-11 DIAGNOSIS — I26.99 BILATERAL PULMONARY EMBOLISM: Primary | ICD-10-CM

## 2024-07-11 DIAGNOSIS — F41.9 ANXIETY: ICD-10-CM

## 2024-07-11 DIAGNOSIS — R06.02 SHORTNESS OF BREATH: ICD-10-CM

## 2024-07-11 DIAGNOSIS — J45.20 MILD INTERMITTENT ASTHMA WITHOUT COMPLICATION: ICD-10-CM

## 2024-07-11 DIAGNOSIS — R06.02 SHORTNESS OF BREATH: Primary | ICD-10-CM

## 2024-07-11 DIAGNOSIS — R09.02 HYPOXIA: ICD-10-CM

## 2024-07-11 LAB
ALBUMIN SERPL-MCNC: 4.2 G/DL (ref 3.5–5.2)
ALBUMIN/GLOB SERPL: 1.4 G/DL
ALP SERPL-CCNC: 104 U/L (ref 39–117)
ALT SERPL W P-5'-P-CCNC: 13 U/L (ref 1–33)
ANION GAP SERPL CALCULATED.3IONS-SCNC: 9 MMOL/L (ref 5–15)
AST SERPL-CCNC: 15 U/L (ref 1–32)
BASOPHILS # BLD AUTO: 0.02 10*3/MM3 (ref 0–0.2)
BASOPHILS NFR BLD AUTO: 0.2 % (ref 0–1.5)
BILIRUB SERPL-MCNC: 0.8 MG/DL (ref 0–1.2)
BUN SERPL-MCNC: 19 MG/DL (ref 8–23)
BUN/CREAT SERPL: 22.6 (ref 7–25)
CALCIUM SPEC-SCNC: 9.3 MG/DL (ref 8.6–10.5)
CHLORIDE SERPL-SCNC: 103 MMOL/L (ref 98–107)
CO2 SERPL-SCNC: 27 MMOL/L (ref 22–29)
CREAT SERPL-MCNC: 0.84 MG/DL (ref 0.57–1)
D DIMER PPP FEU-MCNC: 6.3 MCGFEU/ML (ref 0–0.77)
DEPRECATED RDW RBC AUTO: 40.1 FL (ref 37–54)
EGFRCR SERPLBLD CKD-EPI 2021: 71.7 ML/MIN/1.73
EOSINOPHIL # BLD AUTO: 0.25 10*3/MM3 (ref 0–0.4)
EOSINOPHIL NFR BLD AUTO: 2.5 % (ref 0.3–6.2)
ERYTHROCYTE [DISTWIDTH] IN BLOOD BY AUTOMATED COUNT: 12.9 % (ref 12.3–15.4)
GLOBULIN UR ELPH-MCNC: 3 GM/DL
GLUCOSE SERPL-MCNC: 105 MG/DL (ref 65–99)
HCT VFR BLD AUTO: 38.1 % (ref 34–46.6)
HGB BLD-MCNC: 12.3 G/DL (ref 12–15.9)
HOLD SPECIMEN: NORMAL
IMM GRANULOCYTES # BLD AUTO: 0.04 10*3/MM3 (ref 0–0.05)
IMM GRANULOCYTES NFR BLD AUTO: 0.4 % (ref 0–0.5)
INR PPP: 1.02 (ref 0.91–1.09)
LYMPHOCYTES # BLD AUTO: 1.52 10*3/MM3 (ref 0.7–3.1)
LYMPHOCYTES NFR BLD AUTO: 15.1 % (ref 19.6–45.3)
MCH RBC QN AUTO: 27.6 PG (ref 26.6–33)
MCHC RBC AUTO-ENTMCNC: 32.3 G/DL (ref 31.5–35.7)
MCV RBC AUTO: 85.6 FL (ref 79–97)
MONOCYTES # BLD AUTO: 0.85 10*3/MM3 (ref 0.1–0.9)
MONOCYTES NFR BLD AUTO: 8.4 % (ref 5–12)
NEUTROPHILS NFR BLD AUTO: 7.39 10*3/MM3 (ref 1.7–7)
NEUTROPHILS NFR BLD AUTO: 73.4 % (ref 42.7–76)
NRBC BLD AUTO-RTO: 0 /100 WBC (ref 0–0.2)
NT-PROBNP SERPL-MCNC: 111.6 PG/ML (ref 0–1800)
PLATELET # BLD AUTO: 229 10*3/MM3 (ref 140–450)
PMV BLD AUTO: 9.9 FL (ref 6–12)
POTASSIUM SERPL-SCNC: 4 MMOL/L (ref 3.5–5.2)
PROT SERPL-MCNC: 7.2 G/DL (ref 6–8.5)
PROTHROMBIN TIME: 13.8 SECONDS (ref 11.8–14.8)
RBC # BLD AUTO: 4.45 10*6/MM3 (ref 3.77–5.28)
SODIUM SERPL-SCNC: 139 MMOL/L (ref 136–145)
TROPONIN T SERPL HS-MCNC: 10 NG/L
WBC NRBC COR # BLD AUTO: 10.07 10*3/MM3 (ref 3.4–10.8)
WHOLE BLOOD HOLD COAG: NORMAL
WHOLE BLOOD HOLD SPECIMEN: NORMAL

## 2024-07-11 PROCEDURE — 85379 FIBRIN DEGRADATION QUANT: CPT | Performed by: FAMILY MEDICINE

## 2024-07-11 PROCEDURE — 93005 ELECTROCARDIOGRAM TRACING: CPT | Performed by: FAMILY MEDICINE

## 2024-07-11 PROCEDURE — G0378 HOSPITAL OBSERVATION PER HR: HCPCS

## 2024-07-11 PROCEDURE — 96372 THER/PROPH/DIAG INJ SC/IM: CPT

## 2024-07-11 PROCEDURE — 3078F DIAST BP <80 MM HG: CPT | Performed by: INTERNAL MEDICINE

## 2024-07-11 PROCEDURE — 25510000001 IOPAMIDOL PER 1 ML: Performed by: FAMILY MEDICINE

## 2024-07-11 PROCEDURE — 85025 COMPLETE CBC W/AUTO DIFF WBC: CPT | Performed by: FAMILY MEDICINE

## 2024-07-11 PROCEDURE — 25010000002 ENOXAPARIN PER 10 MG: Performed by: FAMILY MEDICINE

## 2024-07-11 PROCEDURE — G8427 DOCREV CUR MEDS BY ELIG CLIN: HCPCS | Performed by: INTERNAL MEDICINE

## 2024-07-11 PROCEDURE — 1090F PRES/ABSN URINE INCON ASSESS: CPT | Performed by: INTERNAL MEDICINE

## 2024-07-11 PROCEDURE — 25810000003 SODIUM CHLORIDE 0.9 % SOLUTION: Performed by: FAMILY MEDICINE

## 2024-07-11 PROCEDURE — G8417 CALC BMI ABV UP PARAM F/U: HCPCS | Performed by: INTERNAL MEDICINE

## 2024-07-11 PROCEDURE — 1036F TOBACCO NON-USER: CPT | Performed by: INTERNAL MEDICINE

## 2024-07-11 PROCEDURE — 80053 COMPREHEN METABOLIC PANEL: CPT | Performed by: FAMILY MEDICINE

## 2024-07-11 PROCEDURE — 1123F ACP DISCUSS/DSCN MKR DOCD: CPT | Performed by: INTERNAL MEDICINE

## 2024-07-11 PROCEDURE — 84484 ASSAY OF TROPONIN QUANT: CPT | Performed by: FAMILY MEDICINE

## 2024-07-11 PROCEDURE — 3074F SYST BP LT 130 MM HG: CPT | Performed by: INTERNAL MEDICINE

## 2024-07-11 PROCEDURE — 99285 EMERGENCY DEPT VISIT HI MDM: CPT

## 2024-07-11 PROCEDURE — 85610 PROTHROMBIN TIME: CPT | Performed by: FAMILY MEDICINE

## 2024-07-11 PROCEDURE — 71045 X-RAY EXAM CHEST 1 VIEW: CPT

## 2024-07-11 PROCEDURE — 99214 OFFICE O/P EST MOD 30 MIN: CPT | Performed by: INTERNAL MEDICINE

## 2024-07-11 PROCEDURE — 93010 ELECTROCARDIOGRAM REPORT: CPT | Performed by: INTERNAL MEDICINE

## 2024-07-11 PROCEDURE — 71275 CT ANGIOGRAPHY CHEST: CPT

## 2024-07-11 PROCEDURE — G8399 PT W/DXA RESULTS DOCUMENT: HCPCS | Performed by: INTERNAL MEDICINE

## 2024-07-11 PROCEDURE — 83880 ASSAY OF NATRIURETIC PEPTIDE: CPT | Performed by: FAMILY MEDICINE

## 2024-07-11 RX ORDER — SODIUM CHLORIDE 0.9 % (FLUSH) 0.9 %
10 SYRINGE (ML) INJECTION EVERY 12 HOURS SCHEDULED
Status: DISCONTINUED | OUTPATIENT
Start: 2024-07-11 | End: 2024-07-13 | Stop reason: HOSPADM

## 2024-07-11 RX ORDER — BISACODYL 5 MG/1
5 TABLET, DELAYED RELEASE ORAL DAILY PRN
Status: DISCONTINUED | OUTPATIENT
Start: 2024-07-11 | End: 2024-07-13 | Stop reason: HOSPADM

## 2024-07-11 RX ORDER — POLYETHYLENE GLYCOL 3350 17 G/17G
17 POWDER, FOR SOLUTION ORAL DAILY PRN
Status: DISCONTINUED | OUTPATIENT
Start: 2024-07-11 | End: 2024-07-13 | Stop reason: HOSPADM

## 2024-07-11 RX ORDER — ACETAMINOPHEN 160 MG/5ML
650 SOLUTION ORAL EVERY 4 HOURS PRN
Status: DISCONTINUED | OUTPATIENT
Start: 2024-07-11 | End: 2024-07-13 | Stop reason: HOSPADM

## 2024-07-11 RX ORDER — ACETAMINOPHEN 325 MG/1
650 TABLET ORAL EVERY 4 HOURS PRN
Status: DISCONTINUED | OUTPATIENT
Start: 2024-07-11 | End: 2024-07-13 | Stop reason: HOSPADM

## 2024-07-11 RX ORDER — MONTELUKAST SODIUM 10 MG/1
10 TABLET ORAL NIGHTLY
Status: DISCONTINUED | OUTPATIENT
Start: 2024-07-12 | End: 2024-07-13 | Stop reason: HOSPADM

## 2024-07-11 RX ORDER — ESCITALOPRAM OXALATE 10 MG/1
10 TABLET ORAL DAILY
Qty: 90 TABLET | Refills: 1 | Status: SHIPPED | OUTPATIENT
Start: 2024-07-11

## 2024-07-11 RX ORDER — ATENOLOL 25 MG/1
25 TABLET ORAL DAILY
Status: DISCONTINUED | OUTPATIENT
Start: 2024-07-12 | End: 2024-07-13 | Stop reason: HOSPADM

## 2024-07-11 RX ORDER — ENOXAPARIN SODIUM 100 MG/ML
1 INJECTION SUBCUTANEOUS ONCE
Status: COMPLETED | OUTPATIENT
Start: 2024-07-11 | End: 2024-07-11

## 2024-07-11 RX ORDER — SODIUM CHLORIDE 0.9 % (FLUSH) 0.9 %
10 SYRINGE (ML) INJECTION AS NEEDED
Status: DISCONTINUED | OUTPATIENT
Start: 2024-07-11 | End: 2024-07-13 | Stop reason: HOSPADM

## 2024-07-11 RX ORDER — BISACODYL 10 MG
10 SUPPOSITORY, RECTAL RECTAL DAILY PRN
Status: DISCONTINUED | OUTPATIENT
Start: 2024-07-11 | End: 2024-07-13 | Stop reason: HOSPADM

## 2024-07-11 RX ORDER — ACETAMINOPHEN 650 MG/1
650 SUPPOSITORY RECTAL EVERY 4 HOURS PRN
Status: DISCONTINUED | OUTPATIENT
Start: 2024-07-11 | End: 2024-07-13 | Stop reason: HOSPADM

## 2024-07-11 RX ORDER — AMOXICILLIN 250 MG
2 CAPSULE ORAL 2 TIMES DAILY PRN
Status: DISCONTINUED | OUTPATIENT
Start: 2024-07-11 | End: 2024-07-13 | Stop reason: HOSPADM

## 2024-07-11 RX ORDER — SODIUM CHLORIDE 9 MG/ML
75 INJECTION, SOLUTION INTRAVENOUS CONTINUOUS
Status: DISCONTINUED | OUTPATIENT
Start: 2024-07-11 | End: 2024-07-13 | Stop reason: HOSPADM

## 2024-07-11 RX ORDER — CITALOPRAM HYDROBROMIDE 10 MG/1
10 TABLET ORAL DAILY
Status: DISCONTINUED | OUTPATIENT
Start: 2024-07-12 | End: 2024-07-13 | Stop reason: HOSPADM

## 2024-07-11 RX ORDER — ENOXAPARIN SODIUM 100 MG/ML
1 INJECTION SUBCUTANEOUS EVERY 12 HOURS SCHEDULED
Status: DISCONTINUED | OUTPATIENT
Start: 2024-07-12 | End: 2024-07-12

## 2024-07-11 RX ORDER — TRIAMTERENE AND HYDROCHLOROTHIAZIDE 37.5; 25 MG/1; MG/1
1 TABLET ORAL DAILY
Status: DISCONTINUED | OUTPATIENT
Start: 2024-07-12 | End: 2024-07-13 | Stop reason: HOSPADM

## 2024-07-11 RX ORDER — SODIUM CHLORIDE 9 MG/ML
40 INJECTION, SOLUTION INTRAVENOUS AS NEEDED
Status: DISCONTINUED | OUTPATIENT
Start: 2024-07-11 | End: 2024-07-13 | Stop reason: HOSPADM

## 2024-07-11 RX ADMIN — SODIUM CHLORIDE 75 ML/HR: 9 INJECTION, SOLUTION INTRAVENOUS at 23:33

## 2024-07-11 RX ADMIN — IOPAMIDOL 100 ML: 755 INJECTION, SOLUTION INTRAVENOUS at 21:32

## 2024-07-11 RX ADMIN — Medication 10 ML: at 23:33

## 2024-07-11 RX ADMIN — ENOXAPARIN SODIUM 100 MG: 100 INJECTION SUBCUTANEOUS at 22:15

## 2024-07-11 SDOH — ECONOMIC STABILITY: INCOME INSECURITY: HOW HARD IS IT FOR YOU TO PAY FOR THE VERY BASICS LIKE FOOD, HOUSING, MEDICAL CARE, AND HEATING?: NOT VERY HARD

## 2024-07-11 SDOH — ECONOMIC STABILITY: FOOD INSECURITY: WITHIN THE PAST 12 MONTHS, THE FOOD YOU BOUGHT JUST DIDN'T LAST AND YOU DIDN'T HAVE MONEY TO GET MORE.: NEVER TRUE

## 2024-07-11 SDOH — ECONOMIC STABILITY: FOOD INSECURITY: WITHIN THE PAST 12 MONTHS, YOU WORRIED THAT YOUR FOOD WOULD RUN OUT BEFORE YOU GOT MONEY TO BUY MORE.: NEVER TRUE

## 2024-07-11 ASSESSMENT — PATIENT HEALTH QUESTIONNAIRE - PHQ9
3. TROUBLE FALLING OR STAYING ASLEEP: NOT AT ALL
10. IF YOU CHECKED OFF ANY PROBLEMS, HOW DIFFICULT HAVE THESE PROBLEMS MADE IT FOR YOU TO DO YOUR WORK, TAKE CARE OF THINGS AT HOME, OR GET ALONG WITH OTHER PEOPLE: NOT DIFFICULT AT ALL
6. FEELING BAD ABOUT YOURSELF - OR THAT YOU ARE A FAILURE OR HAVE LET YOURSELF OR YOUR FAMILY DOWN: NOT AT ALL
SUM OF ALL RESPONSES TO PHQ9 QUESTIONS 1 & 2: 0
7. TROUBLE CONCENTRATING ON THINGS, SUCH AS READING THE NEWSPAPER OR WATCHING TELEVISION: NOT AT ALL
SUM OF ALL RESPONSES TO PHQ QUESTIONS 1-9: 0
SUM OF ALL RESPONSES TO PHQ QUESTIONS 1-9: 0
9. THOUGHTS THAT YOU WOULD BE BETTER OFF DEAD, OR OF HURTING YOURSELF: NOT AT ALL
SUM OF ALL RESPONSES TO PHQ QUESTIONS 1-9: 0
8. MOVING OR SPEAKING SO SLOWLY THAT OTHER PEOPLE COULD HAVE NOTICED. OR THE OPPOSITE, BEING SO FIGETY OR RESTLESS THAT YOU HAVE BEEN MOVING AROUND A LOT MORE THAN USUAL: NOT AT ALL
2. FEELING DOWN, DEPRESSED OR HOPELESS: NOT AT ALL
SUM OF ALL RESPONSES TO PHQ QUESTIONS 1-9: 0
5. POOR APPETITE OR OVEREATING: NOT AT ALL
4. FEELING TIRED OR HAVING LITTLE ENERGY: NOT AT ALL
1. LITTLE INTEREST OR PLEASURE IN DOING THINGS: NOT AT ALL

## 2024-07-11 NOTE — PROGRESS NOTES
tablet Take 1 tablet by mouth daily as needed for Pain 90 tablet 1    albuterol sulfate HFA (VENTOLIN HFA) 108 (90 Base) MCG/ACT inhaler Inhale 2 puffs into the lungs 4 times daily as needed for Wheezing 18 g 0    atenolol (TENORMIN) 50 MG tablet Take 1 tablet by mouth daily 90 tablet 3    RESTASIS 0.05 % ophthalmic emulsion       aspirin 81 MG EC tablet Take 1 tablet by mouth daily       No current facility-administered medications for this visit.       Review of Systems    /74   Pulse 95   Temp 98.2 °F (36.8 °C)   Wt 89.4 kg (197 lb)   SpO2 92% Comment: O2 sat - dropped to 86% with exertion  BMI 30.85 kg/m²   BP Readings from Last 7 Encounters:   07/11/24 128/74   06/11/24 122/68   05/16/24 122/76   03/27/24 124/80   03/26/24 122/76   01/31/24 122/72   01/02/24 118/62     Wt Readings from Last 7 Encounters:   07/11/24 89.4 kg (197 lb)   06/11/24 89.4 kg (197 lb)   05/16/24 92.5 kg (204 lb)   03/27/24 99.3 kg (219 lb)   03/26/24 99.3 kg (219 lb)   01/31/24 97.5 kg (215 lb)   01/02/24 97.5 kg (215 lb)     BMI Readings from Last 7 Encounters:   07/11/24 30.85 kg/m²   06/11/24 30.85 kg/m²   05/16/24 31.95 kg/m²   03/27/24 34.30 kg/m²   03/26/24 34.30 kg/m²   01/31/24 33.67 kg/m²   01/02/24 33.67 kg/m²     Resp Readings from Last 7 Encounters:   09/06/23 20       Physical Exam  Constitutional:       General: She is not in acute distress.  Eyes:      General: No scleral icterus.  Cardiovascular:      Heart sounds: Normal heart sounds.   Pulmonary:      Comments: Visibly short of breath with some mild tachypnea.  Slightly diminished breath sounds.  Musculoskeletal:      Cervical back: Neck supple.   Lymphadenopathy:      Cervical: No cervical adenopathy.   Skin:     Findings: No rash.   Psychiatric:         Mood and Affect: Mood normal.         Results for orders placed or performed in visit on 03/25/24   Urinalysis with Reflex to Culture    Specimen: Urine   Result Value Ref Range    Color, UA YELLOW

## 2024-07-12 ENCOUNTER — APPOINTMENT (OUTPATIENT)
Dept: CARDIOLOGY | Facility: HOSPITAL | Age: 79
End: 2024-07-12
Payer: MEDICARE

## 2024-07-12 ENCOUNTER — APPOINTMENT (OUTPATIENT)
Dept: ULTRASOUND IMAGING | Facility: HOSPITAL | Age: 79
End: 2024-07-12
Payer: MEDICARE

## 2024-07-12 PROBLEM — I82.401 ACUTE DEEP VEIN THROMBOSIS (DVT) OF RIGHT LOWER EXTREMITY: Status: ACTIVE | Noted: 2024-07-12

## 2024-07-12 LAB
ANION GAP SERPL CALCULATED.3IONS-SCNC: 11 MMOL/L (ref 5–15)
BASOPHILS # BLD AUTO: 0.02 10*3/MM3 (ref 0–0.2)
BASOPHILS NFR BLD AUTO: 0.2 % (ref 0–1.5)
BH CV ECHO MEAS - AO MAX PG: 6.3 MMHG
BH CV ECHO MEAS - AO MEAN PG: 3 MMHG
BH CV ECHO MEAS - AO ROOT DIAM: 3 CM
BH CV ECHO MEAS - AO V2 MAX: 125 CM/SEC
BH CV ECHO MEAS - AO V2 VTI: 28.7 CM
BH CV ECHO MEAS - AVA(I,D): 2.9 CM2
BH CV ECHO MEAS - EDV(CUBED): 74.6 ML
BH CV ECHO MEAS - EDV(MOD-SP2): 66.9 ML
BH CV ECHO MEAS - EDV(MOD-SP4): 66.9 ML
BH CV ECHO MEAS - EF(MOD-BP): 65.8 %
BH CV ECHO MEAS - EF(MOD-SP2): 67 %
BH CV ECHO MEAS - EF(MOD-SP4): 67.3 %
BH CV ECHO MEAS - ESV(CUBED): 10.9 ML
BH CV ECHO MEAS - ESV(MOD-SP2): 22.1 ML
BH CV ECHO MEAS - ESV(MOD-SP4): 21.9 ML
BH CV ECHO MEAS - FS: 47.3 %
BH CV ECHO MEAS - IVS/LVPW: 1.34 CM
BH CV ECHO MEAS - IVSD: 1.5 CM
BH CV ECHO MEAS - LA DIMENSION: 4.6 CM
BH CV ECHO MEAS - LAT PEAK E' VEL: 8.8 CM/SEC
BH CV ECHO MEAS - LV DIASTOLIC VOL/BSA (35-75): 33.3 CM2
BH CV ECHO MEAS - LV MASS(C)D: 203.6 GRAMS
BH CV ECHO MEAS - LV MAX PG: 5 MMHG
BH CV ECHO MEAS - LV MEAN PG: 3 MMHG
BH CV ECHO MEAS - LV SYSTOLIC VOL/BSA (12-30): 10.9 CM2
BH CV ECHO MEAS - LV V1 MAX: 112 CM/SEC
BH CV ECHO MEAS - LV V1 VTI: 24.3 CM
BH CV ECHO MEAS - LVIDD: 4.2 CM
BH CV ECHO MEAS - LVIDS: 2.22 CM
BH CV ECHO MEAS - LVOT AREA: 3.5 CM2
BH CV ECHO MEAS - LVOT DIAM: 2.1 CM
BH CV ECHO MEAS - LVPWD: 1.12 CM
BH CV ECHO MEAS - MED PEAK E' VEL: 3.5 CM/SEC
BH CV ECHO MEAS - MV A MAX VEL: 88.6 CM/SEC
BH CV ECHO MEAS - MV DEC TIME: 0.22 SEC
BH CV ECHO MEAS - MV E MAX VEL: 58.7 CM/SEC
BH CV ECHO MEAS - MV E/A: 0.66
BH CV ECHO MEAS - SV(LVOT): 84.2 ML
BH CV ECHO MEAS - SV(MOD-SP2): 44.8 ML
BH CV ECHO MEAS - SV(MOD-SP4): 45 ML
BH CV ECHO MEAS - SVI(LVOT): 41.9 ML/M2
BH CV ECHO MEAS - SVI(MOD-SP2): 22.3 ML/M2
BH CV ECHO MEAS - SVI(MOD-SP4): 22.4 ML/M2
BH CV ECHO MEASUREMENTS AVERAGE E/E' RATIO: 9.54
BH CV XLRA - RV BASE: 3.3 CM
BH CV XLRA - RV LENGTH: 4.5 CM
BH CV XLRA - RV MID: 2.28 CM
BUN SERPL-MCNC: 17 MG/DL (ref 8–23)
BUN/CREAT SERPL: 22.4 (ref 7–25)
CALCIUM SPEC-SCNC: 8.9 MG/DL (ref 8.6–10.5)
CHLORIDE SERPL-SCNC: 104 MMOL/L (ref 98–107)
CO2 SERPL-SCNC: 25 MMOL/L (ref 22–29)
CREAT SERPL-MCNC: 0.76 MG/DL (ref 0.57–1)
DEPRECATED RDW RBC AUTO: 40.8 FL (ref 37–54)
EGFRCR SERPLBLD CKD-EPI 2021: 80.8 ML/MIN/1.73
EOSINOPHIL # BLD AUTO: 0.27 10*3/MM3 (ref 0–0.4)
EOSINOPHIL NFR BLD AUTO: 3 % (ref 0.3–6.2)
ERYTHROCYTE [DISTWIDTH] IN BLOOD BY AUTOMATED COUNT: 13.1 % (ref 12.3–15.4)
GLUCOSE SERPL-MCNC: 100 MG/DL (ref 65–99)
HCT VFR BLD AUTO: 35.7 % (ref 34–46.6)
HGB BLD-MCNC: 11.4 G/DL (ref 12–15.9)
IMM GRANULOCYTES # BLD AUTO: 0.02 10*3/MM3 (ref 0–0.05)
IMM GRANULOCYTES NFR BLD AUTO: 0.2 % (ref 0–0.5)
LEFT ATRIUM VOLUME INDEX: 31.7 ML/M2
LEFT ATRIUM VOLUME: 63.8 ML
LYMPHOCYTES # BLD AUTO: 1.91 10*3/MM3 (ref 0.7–3.1)
LYMPHOCYTES NFR BLD AUTO: 21.4 % (ref 19.6–45.3)
MCH RBC QN AUTO: 27.5 PG (ref 26.6–33)
MCHC RBC AUTO-ENTMCNC: 31.9 G/DL (ref 31.5–35.7)
MCV RBC AUTO: 86.2 FL (ref 79–97)
MONOCYTES # BLD AUTO: 0.76 10*3/MM3 (ref 0.1–0.9)
MONOCYTES NFR BLD AUTO: 8.5 % (ref 5–12)
NEUTROPHILS NFR BLD AUTO: 5.93 10*3/MM3 (ref 1.7–7)
NEUTROPHILS NFR BLD AUTO: 66.7 % (ref 42.7–76)
NRBC BLD AUTO-RTO: 0 /100 WBC (ref 0–0.2)
PLATELET # BLD AUTO: 207 10*3/MM3 (ref 140–450)
PMV BLD AUTO: 10.6 FL (ref 6–12)
POTASSIUM SERPL-SCNC: 3.6 MMOL/L (ref 3.5–5.2)
POTASSIUM SERPL-SCNC: 3.9 MMOL/L (ref 3.5–5.2)
POTASSIUM SERPL-SCNC: 4.7 MMOL/L (ref 3.5–5.2)
RBC # BLD AUTO: 4.14 10*6/MM3 (ref 3.77–5.28)
SODIUM SERPL-SCNC: 140 MMOL/L (ref 136–145)
WBC NRBC COR # BLD AUTO: 8.91 10*3/MM3 (ref 3.4–10.8)

## 2024-07-12 PROCEDURE — 93306 TTE W/DOPPLER COMPLETE: CPT | Performed by: INTERNAL MEDICINE

## 2024-07-12 PROCEDURE — 93970 EXTREMITY STUDY: CPT

## 2024-07-12 PROCEDURE — 93306 TTE W/DOPPLER COMPLETE: CPT

## 2024-07-12 PROCEDURE — G0378 HOSPITAL OBSERVATION PER HR: HCPCS

## 2024-07-12 PROCEDURE — 85025 COMPLETE CBC W/AUTO DIFF WBC: CPT | Performed by: FAMILY MEDICINE

## 2024-07-12 PROCEDURE — 93970 EXTREMITY STUDY: CPT | Performed by: SURGERY

## 2024-07-12 PROCEDURE — 84132 ASSAY OF SERUM POTASSIUM: CPT | Performed by: INTERNAL MEDICINE

## 2024-07-12 PROCEDURE — 25010000002 ENOXAPARIN PER 10 MG: Performed by: NURSE PRACTITIONER

## 2024-07-12 PROCEDURE — 25010000002 ENOXAPARIN PER 10 MG: Performed by: FAMILY MEDICINE

## 2024-07-12 PROCEDURE — 94618 PULMONARY STRESS TESTING: CPT

## 2024-07-12 PROCEDURE — 25010000002 POTASSIUM CHLORIDE 10 MEQ/100ML SOLUTION: Performed by: INTERNAL MEDICINE

## 2024-07-12 PROCEDURE — 96372 THER/PROPH/DIAG INJ SC/IM: CPT

## 2024-07-12 PROCEDURE — 80048 BASIC METABOLIC PNL TOTAL CA: CPT | Performed by: FAMILY MEDICINE

## 2024-07-12 RX ORDER — ATENOLOL 50 MG/1
50 TABLET ORAL DAILY
COMMUNITY

## 2024-07-12 RX ORDER — ESCITALOPRAM OXALATE 10 MG/1
10 TABLET ORAL DAILY
COMMUNITY

## 2024-07-12 RX ORDER — POTASSIUM CHLORIDE 7.45 MG/ML
10 INJECTION INTRAVENOUS
Status: DISCONTINUED | OUTPATIENT
Start: 2024-07-12 | End: 2024-07-12

## 2024-07-12 RX ORDER — POTASSIUM CHLORIDE 750 MG/1
40 CAPSULE, EXTENDED RELEASE ORAL
Status: COMPLETED | OUTPATIENT
Start: 2024-07-12 | End: 2024-07-12

## 2024-07-12 RX ORDER — MONTELUKAST SODIUM 10 MG/1
10 TABLET ORAL NIGHTLY
COMMUNITY

## 2024-07-12 RX ORDER — ENOXAPARIN SODIUM 100 MG/ML
1 INJECTION SUBCUTANEOUS EVERY 12 HOURS SCHEDULED
Status: DISCONTINUED | OUTPATIENT
Start: 2024-07-12 | End: 2024-07-13

## 2024-07-12 RX ADMIN — Medication 10 ML: at 20:40

## 2024-07-12 RX ADMIN — TRIAMTERENE AND HYDROCHLOROTHIAZIDE 1 TABLET: 37.5; 25 TABLET ORAL at 08:23

## 2024-07-12 RX ADMIN — POTASSIUM CHLORIDE 40 MEQ: 750 CAPSULE, EXTENDED RELEASE ORAL at 15:44

## 2024-07-12 RX ADMIN — ENOXAPARIN SODIUM 100 MG: 100 INJECTION SUBCUTANEOUS at 08:23

## 2024-07-12 RX ADMIN — POTASSIUM CHLORIDE 40 MEQ: 750 CAPSULE, EXTENDED RELEASE ORAL at 12:45

## 2024-07-12 RX ADMIN — MONTELUKAST SODIUM 10 MG: 10 TABLET, FILM COATED ORAL at 20:40

## 2024-07-12 RX ADMIN — POTASSIUM CHLORIDE 10 MEQ: 7.46 INJECTION, SOLUTION INTRAVENOUS at 08:32

## 2024-07-12 RX ADMIN — Medication 10 ML: at 08:23

## 2024-07-12 RX ADMIN — CITALOPRAM 10 MG: 10 TABLET, FILM COATED ORAL at 08:22

## 2024-07-12 RX ADMIN — ENOXAPARIN SODIUM 90 MG: 100 INJECTION SUBCUTANEOUS at 20:39

## 2024-07-12 NOTE — PROGRESS NOTES
Exercise Oximetry    Patient Name:Holli Iyer   MRN: [id number]   Date: 07/12/24             ROOM AIR BASELINE   SpO2% 93   Heart Rate 86   Blood Pressure      EXERCISE ON ROOM AIR SpO2% EXERCISE ON O2 @  LPM SpO2%   1 MINUTE 93 1 MINUTE    2 MINUTES 93 2 MINUTES    3 MINUTES 92 3 MINUTES    4 MINUTES 90 4 MINUTES    5 MINUTES 90 5 MINUTES    6 MINUTES 92 6 MINUTES               Distance Walked  600 ft Distance Walked   Dyspnea (Mayra Scale)   Dyspnea (Mayra Scale)   Fatigue (Mayra Scale)   Fatigue (Mayra Scale)   SpO2% Post Exercise  91 SpO2% Post Exercise   HR Post Exercise  88 HR Post Exercise   Time to Recovery  less than 1 min Time to Recovery     Comments: Pt does not qualify for oxygen at this time

## 2024-07-12 NOTE — H&P
[facility] Medicine Services  HISTORY AND PHYSICAL    Date of Admission: 7/11/2024  Primary Care Physician: Kaley Pearson MD    Subjective   Primary Historian: Patient    Chief Complaint: Shortness of breath    History of Present Illness  79-year-old female with past medical history of bronchial asthma, GERD, hypothyroidism, presents to the emergency room with complaints of shortness of breath for 4 to 5 days.  She saw her primary care doctor every year who had her walk around the building and noticed that her oxygen saturation went down to 89% and was referred to the emergency room.  She presents with normal vital signs and oxygen saturation 95% on room air.  Blood work shows a D-dimer 6.3.  CTA chest showed bilateral pulmonary emboli in 5 segmental pulmonary arteries, RV to LV radial 1.13.    Patient denies chest pain.  No recent travel.  No family history of thromboembolic disease.  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    Review of Systems   Otherwise complete ROS reviewed and negative except as mentioned in the HPI.    Past Medical History:   Past Medical History:   Diagnosis Date    Anemia     As a child    Asthma     Bronchial asthma    Diverticulosis     GERD (gastroesophageal reflux disease)     History of colon polyps     Hyperthyroidism     Ingrown toenail     Insomnia     Irregular heart beat     MVP (mitral valve prolapse)     Joya splints     In 40's    Sleep apnea     Stress fracture     Stroke     TIA in 1990s    TIA (transient ischemic attack)      Past Surgical History:  Past Surgical History:   Procedure Laterality Date    BREAST BIOPSY Left     COLONOSCOPY  02/12/2015    Diverticulosis in the sigmoid colon; One 5mm hyperplastic polyp in the sigmoid colon; The examination was otherwise normal on direct and retroflexion views; Repeat 5 years    COLONOSCOPY N/A 04/11/2022    Diverticulosis in the left colon; One 4mm hyperplastic  polyp in the rectum; The examination was otherwise normal on direct and retroflexion views; Repeat 10 years    ENDOSCOPY N/A 10/26/2017    Normal esophagus; A single gastric polyp-biopsied; Normal stomach; Normal examined duodenum     Social History:  reports that she has never smoked. She has never used smokeless tobacco. She reports current alcohol use of about 1.0 standard drink of alcohol per week. She reports that she does not use drugs.    Family History: family history includes Cancer in her father and mother; Diabetes in her brother; Heart failure (age of onset: 100) in her mother; Osteoporosis in her mother; Ulcerative colitis in her mother.       Allergies:  Allergies   Allergen Reactions    Declomycin [Demeclocycline] Palpitations    Sulfa Antibiotics Itching     As a teenager       Medications:  Prior to Admission medications    Medication Sig Start Date End Date Taking? Authorizing Provider   aspirin 81 MG EC tablet Take 1 tablet by mouth Daily.  Patient not taking: Reported on 4/22/2024    Srinivas Lynne MD   atenolol (TENORMIN) 25 MG tablet Take 2 tablets by mouth Daily.  Patient taking differently: Take 1 tablet by mouth Daily. 3/9/22   Obdulia Mijares DO   citalopram (CeleXA) 10 MG tablet TAKE 1 TABLET EVERY MORNING  Patient taking differently: Take 1 tablet by mouth Daily. 1/10/22   Gavino Sanchez MD   meloxicam (MOBIC) 7.5 MG tablet Take 1 tablet by mouth Daily.    Srinivas Lynne MD   montelukast (SINGULAIR) 4 MG chewable tablet Chew 1 tablet Every Night.    Srinivas Lynne MD   triamterene-hydrochlorothiazide (DYAZIDE) 37.5-25 MG per capsule Take 1 capsule by mouth Daily. 10/6/23   Srinivas Lynne MD   vitamin D (ERGOCALCIFEROL) 1.25 MG (72878 UT) capsule capsule Take 1 capsule by mouth Every 7 (Seven) Days. 2/15/22   Rita Byrne, DAVID     I have utilized all available immediate resources to obtain, update, or review the patient's current  "medications (including all prescriptions, over-the-counter products, herbals, cannabis/cannabidiol products, and vitamin/mineral/dietary (nutritional) supplements).    Objective     Vital Signs: /67 (BP Location: Right arm, Patient Position: Sitting)   Pulse 70   Temp 98.6 °F (37 °C) (Oral)   Resp 14   Ht 170.2 cm (67.01\")   Wt 95.3 kg (210 lb 1.6 oz)   SpO2 95%   BMI 32.90 kg/m²   Physical Exam  Vitals reviewed.   Constitutional:       General: She is not in acute distress.     Appearance: She is well-developed. She is not toxic-appearing.   HENT:      Head: Normocephalic and atraumatic.      Right Ear: External ear normal.      Left Ear: External ear normal.      Mouth/Throat:      Mouth: Mucous membranes are dry.      Pharynx: Oropharynx is clear.   Eyes:      General:         Right eye: No discharge.         Left eye: No discharge.      Extraocular Movements: Extraocular movements intact.      Conjunctiva/sclera: Conjunctivae normal.      Pupils: Pupils are equal, round, and reactive to light.   Neck:      Vascular: No JVD.   Cardiovascular:      Rate and Rhythm: Normal rate and regular rhythm.      Pulses: Normal pulses.      Heart sounds: Normal heart sounds. No murmur heard.     No friction rub. No gallop.   Pulmonary:      Effort: Pulmonary effort is normal. No respiratory distress.      Breath sounds: No stridor. No wheezing, rhonchi or rales.   Chest:      Chest wall: No tenderness.   Abdominal:      General: Bowel sounds are normal. There is no distension.      Palpations: Abdomen is soft.      Tenderness: There is no abdominal tenderness. There is no guarding or rebound.      Hernia: No hernia is present.   Musculoskeletal:         General: No swelling, tenderness or deformity. Normal range of motion.      Cervical back: Normal range of motion and neck supple. No rigidity or tenderness. No muscular tenderness.      Right lower leg: No edema.      Left lower leg: No edema.   Skin:     " "General: Skin is warm and dry.      Findings: No erythema or rash.   Neurological:      General: No focal deficit present.      Mental Status: She is alert and oriented to person, place, and time.      Cranial Nerves: No cranial nerve deficit.      Sensory: No sensory deficit.      Motor: No weakness or abnormal muscle tone.      Deep Tendon Reflexes: Reflexes normal.   Psychiatric:         Mood and Affect: Mood normal.         Behavior: Behavior normal.     Results Reviewed:  Lab Results (last 24 hours)       Procedure Component Value Units Date/Time    D-dimer, Quantitative [[id number]]  (Abnormal) Collected: 07/11/24 1942    Specimen: Blood Updated: 07/11/24 2020     D-Dimer, Quantitative 6.30 MCGFEU/mL     Narrative:      According to the assay 's published package insert, a normal (<0.50 MCGFEU/mL) D-dimer result in conjunction with a non-high clinical probability assessment, excludes deep vein thrombosis (DVT) and pulmonary embolism (PE) with high sensitivity.    D-dimer values increase with age and this can make VTE exclusion of an older population difficult. To address this, the [employer], based on best available evidence and recent guidelines, recommends that clinicians use age-adjusted D-dimer thresholds in patients greater than 50 years of age with: a) a low probability of PE who do not meet all Pulmonary Embolism Rule Out Criteria, or b) in those with intermediate probability of PE.   The formula for an age-adjusted D-dimer cut-off is \"age/100\".  For example, a 60 year old patient would have an age-adjusted cut-off of 0.60 MCGFEU/mL and an 80 year old 0.80 MCGFEU/mL.    Comprehensive Metabolic Panel [[id number]]  (Abnormal) Collected: 07/11/24 1942    Specimen: Blood Updated: 07/11/24 2016     Glucose 105 mg/dL      BUN 19 mg/dL      Creatinine 0.84 mg/dL      Sodium 139 mmol/L      Potassium 4.0 mmol/L      Chloride 103 mmol/L      CO2 27.0 mmol/L      Calcium 9.3 mg/dL  "     Total Protein 7.2 g/dL      Albumin 4.2 g/dL      ALT (SGPT) 13 U/L      AST (SGOT) 15 U/L      Alkaline Phosphatase 104 U/L      Total Bilirubin 0.8 mg/dL      Globulin 3.0 gm/dL      A/G Ratio 1.4 g/dL      BUN/Creatinine Ratio 22.6     Anion Gap 9.0 mmol/L      eGFR 71.7 mL/min/1.73     Narrative:      GFR Normal >60  Chronic Kidney Disease <60  Kidney Failure <15    The GFR formula is only valid for adults with stable renal function between ages 18 and 70.    BNP [[id number]]  (Normal) Collected: 07/11/24 1942    Specimen: Blood Updated: 07/11/24 2013     proBNP 111.6 pg/mL     Narrative:      This assay is used as an aid in the diagnosis of individuals suspected of having heart failure. It can be used as an aid in the diagnosis of acute decompensated heart failure (ADHF) in patients presenting with signs and symptoms of ADHF to the emergency department (ED). In addition, NT-proBNP of <300 pg/mL indicates ADHF is not likely.    Age Range Result Interpretation  NT-proBNP Concentration (pg/mL:      <50             Positive            >450                   Gray                 300-450                    Negative             <300    50-75           Positive            >900                  Gray                300-900                  Negative            <300      >75             Positive            >1800                  Gray                300-1800                  Negative            <300    Single High Sensitivity Troponin T [[id number]]  (Normal) Collected: 07/11/24 1942    Specimen: Blood Updated: 07/11/24 2013     HS Troponin T 10 ng/L     Narrative:      High Sensitive Troponin T Reference Range:  <14.0 ng/L- Negative Female for AMI  <22.0 ng/L- Negative Male for AMI  >=14 - Abnormal Female indicating possible myocardial injury.  >=22 - Abnormal Male indicating possible myocardial injury.   Clinicians would have to utilize clinical acumen, EKG, Troponin, and serial changes to determine if it is an Acute  Myocardial Infarction or myocardial injury due to an underlying chronic condition.         [place] Draw [[id number]] Collected: 07/11/24 1940    Specimen: Blood Updated: 07/11/24 2001    Narrative:      The following orders were created for panel order [place] Draw.  Procedure                               Abnormality         Status                     ---------                               -----------         ------                     Green Top (Gel)[[id number]]                                  Final result               Lavender Top[[id number]]                                     Final result               Red Top[[id number]]                                          Final result               [place] Blood Culture Chava...[[id number]]                      Final result               Gray Top[[id number]]                                         Final result               Light Blue Top[[id number]]                                   Final result                 Please view results for these tests on the individual orders.    Green Top (Gel) [[id number]] Collected: 07/11/24 1942    Specimen: Blood Updated: 07/11/24 2001     Extra Tube Hold for add-ons.     Comment: Auto resulted.       Lavender Top [[id number]] Collected: 07/11/24 1942    Specimen: Blood Updated: 07/11/24 2001     Extra Tube hold for add-on     Comment: Auto resulted       Red Top [[id number]] Collected: 07/11/24 1942    Specimen: Blood Updated: 07/11/24 2001     Extra Tube Hold for add-ons.     Comment: Auto resulted.       [place] Blood Culture Bottle Set [[id number]] Collected: 07/11/24 1940    Specimen: Blood from Wrist, Left Updated: 07/11/24 2001     Extra Tube Hold for add-ons.     Comment: Auto resulted.       Gray Top [[id number]] Collected: 07/11/24 1942    Specimen: Blood Updated: 07/11/24 2001     Extra Tube Hold for add-ons.     Comment: Auto resulted.       Light Blue Top [[id number]] Collected: 07/11/24 1942    Specimen: Blood Updated: 07/11/24 2001      Extra Tube Hold for add-ons.     Comment: Auto resulted       CBC & Differential [[id number]]  (Abnormal) Collected: 07/11/24 1942    Specimen: Blood Updated: 07/11/24 1955    Narrative:      The following orders were created for panel order CBC & Differential.  Procedure                               Abnormality         Status                     ---------                               -----------         ------                     CBC Auto Differential[[id number]]        Abnormal            Final result                 Please view results for these tests on the individual orders.    CBC Auto Differential [[id number]]  (Abnormal) Collected: 07/11/24 1942    Specimen: Blood Updated: 07/11/24 1955     WBC 10.07 10*3/mm3      RBC 4.45 10*6/mm3      Hemoglobin 12.3 g/dL      Hematocrit 38.1 %      MCV 85.6 fL      MCH 27.6 pg      MCHC 32.3 g/dL      RDW 12.9 %      RDW-SD 40.1 fl      MPV 9.9 fL      Platelets 229 10*3/mm3      Neutrophil % 73.4 %      Lymphocyte % 15.1 %      Monocyte % 8.4 %      Eosinophil % 2.5 %      Basophil % 0.2 %      Immature Grans % 0.4 %      Neutrophils, Absolute 7.39 10*3/mm3      Lymphocytes, Absolute 1.52 10*3/mm3      Monocytes, Absolute 0.85 10*3/mm3      Eosinophils, Absolute 0.25 10*3/mm3      Basophils, Absolute 0.02 10*3/mm3      Immature Grans, Absolute 0.04 10*3/mm3      nRBC 0.0 /100 WBC           Imaging Results (Last 24 Hours)       Procedure Component Value Units Date/Time    CT Angiogram Chest [[id number]] Collected: 07/11/24 2135     Updated: 07/11/24 2146    Narrative:      CT ANGIOGRAM CHEST- 7/11/2024 8:18 PM     HISTORY: Elevated D-dimer Short of breath      COMPARISON: None     TOTAL DOSE LENGTH PRODUCT: 260.91 mGy.cm. Automated exposure control was  also utilized to decrease patient radiation dose.     TECHNIQUE: Axial images of the chest are performed following IV  contrast. 2D, 3D MIPS reconstructed images are reviewed     FINDINGS: There are diffuse bilateral  pulmonary arteries involving each  lobar pulmonary artery. There is no central saddle embolus identified.  The RV to LV ratio measuring approximately 1.13. No thoracic aortic  aneurysm or dissection. Cardiomegaly. No pericardial effusion. No  pathologic intrathoracic Raxone lymphadenopathy.     Images the upper abdomen demonstrate no adrenal nodules. Peripelvic  renal cysts.     Mosaic attenuation of the pulmonary parenchyma with no dense  consolidation. Peripheral subpleural opacities of the inferior right  middle lobe may relate to ischemia/infarct. No pneumothorax. No  endobronchial lesion.     Degenerative changes of the regional skeleton. Chronic appearing  compression of the L1 vertebra. No focal aggressive regional bony  lesions.          Impression:      1. Diffuse bilateral pulmonary emboli involving all 5 segmental  pulmonary arteries. No central saddle embolus. RV to LV ratio measuring  1.13. This finding called to Dr. Allan in the ER at 9:42 p.m. on  7/11/2024.  2. Mosaic attenuation lung parenchyma relate to air trapping. Peripheral  inferior right middle lobe opacities may represent ischemic change.  3. Cardiomegaly.     This report was signed and finalized on 7/11/2024 9:43 PM by Dr. Martha Rivers MD.       XR Chest 1 View [[id number]] Collected: 07/11/24 1929     Updated: 07/11/24 1933    Narrative:      XR CHEST 1 VW-     HISTORY: SOA Triage Protocol     COMPARISON: 2/3/2021     FINDINGS: Frontal view the chest obtained.     Diminished level of inspiration. Cardiomegaly with vascular crowding  versus mild venous congestion. Mild elevation right hemidiaphragm. No  pleural effusion or pneumothorax. No acute regional bony pathology.  Degenerative change thoracic spine.       Impression:      1. Low lung volumes with cardiomegaly and vascular crowding versus mild  venous congestion.        This report was signed and finalized on 7/11/2024 7:30 PM by Dr. Martha Rivers MD.             I have  personally reviewed and interpreted the radiology studies and ECG obtained at time of admission.     Assessment / Plan   Assessment:   Active Hospital Problems    Diagnosis     **Bilateral pulmonary embolism     Gluten intolerance     Mitral valve prolapse     Benign hypertension     GILL (obstructive sleep apnea)     Gastroesophageal reflux disease without esophagitis      Treatment Plan  The patient will be admitted to my service here at [facility].   Admit to remote telemetry  Vitals every 4 hours  Cardiac diet  IV fluids normal saline 75 cc an hour  Lovenox 1 mg/kg every 12 hours  Echocardiogram 2D to evaluate for RV strain and if positive consider EKOS with cardiology consult  Ultrasound Doppler lower extremities rule out DVT    Home medications reviewed    DVT prophylaxis patient is anticoagulated    Medical Decision Making  Number and Complexity of problems: 1 new complex problem, over 4 chronic complex problems  Differential Diagnosis: See above    Conditions and Status        Condition is unchanged.     MDM Data  External documents reviewed: .com EHR  Cardiac tracing (EKG, telemetry) interpretation: NSR  Radiology interpretation: See above  Labs reviewed: see above  Any tests that were considered but not ordered: none     Decision rules/scores evaluated (example [id number], [place], etc): N/A     Discussed with: Patient     Care Planning  Shared decision making: Patient  Code status and discussions: Full code    Disposition  Social Determinants of Health that impact treatment or disposition: none  Estimated length of stay is to be determined.     I confirmed that the patient's advanced care plan is present, code status is documented, and a surrogate decision maker is listed in the patient's medical record.     The patient's surrogate decision maker is family, see records.     The patient was seen and examined by me on 7/11/2024 at 2217.    Electronically signed by Gerardo Shukla MD,  07/11/24, 22:17 CDT.

## 2024-07-12 NOTE — ED PROVIDER NOTES
HPI:    Patient is a 79-year-old white female who presents from her PCPs office with a complaint of shortness of breath that been present for the past 4 to 5 days.  Patient states that while she was walking around in her physician's office her oxygenation dropped down to 89%.  Patient denies any fever chills or night sweats no nausea vomiting diarrhea.  Patient states she did have pain in her left flank and back this morning when she took a deep breath that made it worse.  Since this time it has resolved.  Patient denies any recent illness.  Patient states she has had refrain that she had to move and after removing her a day or so afterwards started having some right calf pain.  Patient's Pain is now resolved but she is concerned that a blood clot could have been the cause of her shortness of breath.  There is no new foods medications or ill contacts.  Patient denies any previous occurrence.  Patient denies any chest pain.  No diaphoresis.      REVIEW OF SYSTEMS  CONSTITUTIONAL:  No complaints of fever, chills,or weakness  EYES:  No complaints of discharge   ENT: No complaints of sore throat or ear pain  CARDIOVASCULAR:  No complaints of chest pain, palpitations, or swelling  RESPIRATORY: Positive for shortness of breath worse with ambulation with O2 sat dropping to 89%   GI:  No complaints of abdominal pain, nausea, vomiting, or diarrhea  MUSCULOSKELETAL:  No complaints of back pain  SKIN:  No complaints of rash  NEUROLOGIC: Positive for headache now resolved.    ENDOCRINE:  No complaints of polyuria or polydipsia  LYMPHATIC:  No complaints of swollen glands  GENITOURINARY: No complaints of urinary frequency or hematuria        PAST MEDICAL HISTORY  Past Medical History:   Diagnosis Date    Anemia     As a child    Asthma     Bronchial asthma    Diverticulosis     GERD (gastroesophageal reflux disease)     History of colon polyps     Hyperthyroidism     Ingrown toenail     Insomnia     Irregular heart beat     MVP  (mitral valve prolapse)     Joya splints     In 's    Sleep apnea     Stress fracture     Stroke     TIA in     TIA (transient ischemic attack)        FAMILY HISTORY  Family History   Problem Relation Age of Onset    Heart failure Mother 100    Ulcerative colitis Mother     Cancer Mother          at 102    Osteoporosis Mother          102    Cancer Father         Bladder cancer    Diabetes Brother     Colon cancer Neg Hx     Esophageal cancer Neg Hx     Colon polyps Neg Hx     Liver cancer Neg Hx     Rectal cancer Neg Hx     Stomach cancer Neg Hx     Liver disease Neg Hx        SOCIAL HISTORY  Social History     Socioeconomic History    Marital status:    Tobacco Use    Smoking status: Never    Smokeless tobacco: Never   Vaping Use    Vaping status: Never Used   Substance and Sexual Activity    Alcohol use: Yes     Alcohol/week: 1.0 standard drink of alcohol     Types: 1 Glasses of wine per week     Comment: Rarely    Drug use: Never    Sexual activity: Not Currently     Partners: Male     Birth control/protection: Post-menopausal       IMMUNIZATION HISTORY  Deferred to primary care physician.    SURGICAL HISTORY  Past Surgical History:   Procedure Laterality Date    BREAST BIOPSY Left     COLONOSCOPY  2015    Diverticulosis in the sigmoid colon; One 5mm hyperplastic polyp in the sigmoid colon; The examination was otherwise normal on direct and retroflexion views; Repeat 5 years    COLONOSCOPY N/A 2022    Diverticulosis in the left colon; One 4mm hyperplastic polyp in the rectum; The examination was otherwise normal on direct and retroflexion views; Repeat 10 years    ENDOSCOPY N/A 10/26/2017    Normal esophagus; A single gastric polyp-biopsied; Normal stomach; Normal examined duodenum       CURRENT MEDICATIONS    Current Facility-Administered Medications:     Enoxaparin Sodium (LOVENOX) syringe 100 mg, 1 mg/kg, Subcutaneous, Once, Desean Allan Jr., MD    sodium chloride  "0.9 % flush 10 mL, 10 mL, Intravenous, PRN, Desean Allan Jr., MD    Current Outpatient Medications:     aspirin 81 MG EC tablet, Take 1 tablet by mouth Daily. (Patient not taking: Reported on 4/22/2024), Disp: , Rfl:     atenolol (TENORMIN) 25 MG tablet, Take 2 tablets by mouth Daily. (Patient taking differently: Take 1 tablet by mouth Daily.), Disp: 90 tablet, Rfl: 3    citalopram (CeleXA) 10 MG tablet, TAKE 1 TABLET EVERY MORNING (Patient taking differently: Take 1 tablet by mouth Daily.), Disp: 90 tablet, Rfl: 0    meloxicam (MOBIC) 7.5 MG tablet, Take 1 tablet by mouth Daily., Disp: , Rfl:     montelukast (SINGULAIR) 4 MG chewable tablet, Chew 1 tablet Every Night., Disp: , Rfl:     triamterene-hydrochlorothiazide (DYAZIDE) 37.5-25 MG per capsule, Take 1 capsule by mouth Daily., Disp: , Rfl:     vitamin D (ERGOCALCIFEROL) 1.25 MG (98721 UT) capsule capsule, Take 1 capsule by mouth Every 7 (Seven) Days., Disp: 24 capsule, Rfl: 3    ALLERGIES  Allergies   Allergen Reactions    Declomycin [Demeclocycline] Palpitations    Sulfa Antibiotics Itching     As a teenager         Respiratory Exam    VITAL SIGNS:   /67 (BP Location: Right arm, Patient Position: Sitting)   Pulse 70   Temp 98.6 °F (37 °C) (Oral)   Resp 14   Ht 170.2 cm (67.01\")   Wt 95.3 kg (210 lb 1.6 oz)   SpO2 95%   BMI 32.90 kg/m²     Constitutional: Patient is alert and in no distress.  Patient with mild head discomfort.    Eyes: EOMI and PERRLA intact    ENT: There is a normal pharynx with no acute erythema or exudate and oral mucosa is moist.  Nose is clear with no drainage.  Tympanic membranes intact and non-erythemic    Cardiovascular: S1-S2 regular rate and rhythm no murmur rubs or gallops    Respiratory: Decreased breath sounds in both lung bases but no coarse rhonchi or wheezing is noted.    Abdomen: Soft nontender bowel sounds are normal in all 4 quadrants there is no rebound or guarding noted.  There is no abdominal " distention or hepatosplenomegaly.    Genitourinary: Patient is voiding appropriately.    Integument: No acute lesions noted color appears to be normal.    Roxann Coma Scale: Total score 15    Neurological: Patient is alert and oriented x4 in no acute findings noted.  Speech is fluent and cognition is normal.  No evidence of acute CVA.  Cranial nerves II through XII intact.  Patient with normal motor function as well as reflexes and sensation    Psychiatric: Normal affect and mood      RADIOLOGY/PROCEDURES    CT Angiogram Chest   Final Result   1. Diffuse bilateral pulmonary emboli involving all 5 segmental   pulmonary arteries. No central saddle embolus. RV to LV ratio measuring   1.13. This finding called to Dr. Allan in the ER at 9:42 p.m. on   7/11/2024.   2. Mosaic attenuation lung parenchyma relate to air trapping. Peripheral   inferior right middle lobe opacities may represent ischemic change.   3. Cardiomegaly.       This report was signed and finalized on 7/11/2024 9:43 PM by Dr. Martha Rivers MD.          XR Chest 1 View   Final Result   1. Low lung volumes with cardiomegaly and vascular crowding versus mild   venous congestion.           This report was signed and finalized on 7/11/2024 7:30 PM by Dr. Martha Rivers MD.                FUTURE APPOINTMENTS     No future appointments.           COURSE & MEDICAL DECISION MAKING     Patient's partial differential diagnosis can include:    Pneumonia, pneumonitis, pulmonary embolism, pneumothorax, pneumonitis, pleural effusion, pulmonary edema, bronchospasm, lung cancer, and others    Discussed case with the patient and informed her of the results of the CTA of the chest which shows bilateral pulmonary emboli with according to radiology borderline heart strain.  Patient will need to be admitted to the hospital will administer Lovenox 1 mg/kg subcu x 1.  Will call hospitalist Dr. Shukla for admission.    Cussed the case with hospitalist Dr. Shukla  who will admit the patient under his medical care.        Patient's level of risk: high        CRITICAL CARE    CRITICAL CARE: No   CRITICAL CARE TIME: None        Also Old charts were reviewed per [facility] EMR.  Pertinent details are summarized above.  All laboratory, radiologic, and EKG studies that were performed in the Emergency Department were a necessary part of the evaluation needed to exclude unstable or  emergent medical conditions:     Patient was hemodynamically and neurologically stable in the ED.   Pertinent studies were reviewed as above.     Recent Results (from the past 24 hour(s))   ECG 12 Lead ED Triage Standing Order; SOA    Collection Time: 07/11/24  7:36 PM   Result Value Ref Range    QT Interval 418 ms    QTC Interval 447 ms   [place] Blood Culture Bottle Set    Collection Time: 07/11/24  7:40 PM    Specimen: Wrist, Left; Blood   Result Value Ref Range    Extra Tube Hold for add-ons.    Comprehensive Metabolic Panel    Collection Time: 07/11/24  7:42 PM    Specimen: Blood   Result Value Ref Range    Glucose 105 (H) 65 - 99 mg/dL    BUN 19 8 - 23 mg/dL    Creatinine 0.84 0.57 - 1.00 mg/dL    Sodium 139 136 - 145 mmol/L    Potassium 4.0 3.5 - 5.2 mmol/L    Chloride 103 98 - 107 mmol/L    CO2 27.0 22.0 - 29.0 mmol/L    Calcium 9.3 8.6 - 10.5 mg/dL    Total Protein 7.2 6.0 - 8.5 g/dL    Albumin 4.2 3.5 - 5.2 g/dL    ALT (SGPT) 13 1 - 33 U/L    AST (SGOT) 15 1 - 32 U/L    Alkaline Phosphatase 104 39 - 117 U/L    Total Bilirubin 0.8 0.0 - 1.2 mg/dL    Globulin 3.0 gm/dL    A/G Ratio 1.4 g/dL    BUN/Creatinine Ratio 22.6 7.0 - 25.0    Anion Gap 9.0 5.0 - 15.0 mmol/L    eGFR 71.7 >60.0 mL/min/1.73   BNP    Collection Time: 07/11/24  7:42 PM    Specimen: Blood   Result Value Ref Range    proBNP 111.6 0.0 - 1,800.0 pg/mL   Single High Sensitivity Troponin T    Collection Time: 07/11/24  7:42 PM    Specimen: Blood   Result Value Ref Range    HS Troponin T 10 <14 ng/L   Green Top (Gel)    Collection Time:  07/11/24  7:42 PM   Result Value Ref Range    Extra Tube Hold for add-ons.    Lavender Top    Collection Time: 07/11/24  7:42 PM   Result Value Ref Range    Extra Tube hold for add-on    Red Top    Collection Time: 07/11/24  7:42 PM   Result Value Ref Range    Extra Tube Hold for add-ons.    Gray Top    Collection Time: 07/11/24  7:42 PM   Result Value Ref Range    Extra Tube Hold for add-ons.    Light Blue Top    Collection Time: 07/11/24  7:42 PM   Result Value Ref Range    Extra Tube Hold for add-ons.    CBC Auto Differential    Collection Time: 07/11/24  7:42 PM    Specimen: Blood   Result Value Ref Range    WBC 10.07 3.40 - 10.80 10*3/mm3    RBC 4.45 3.77 - 5.28 10*6/mm3    Hemoglobin 12.3 12.0 - 15.9 g/dL    Hematocrit 38.1 34.0 - 46.6 %    MCV 85.6 79.0 - 97.0 fL    MCH 27.6 26.6 - 33.0 pg    MCHC 32.3 31.5 - 35.7 g/dL    RDW 12.9 12.3 - 15.4 %    RDW-SD 40.1 37.0 - 54.0 fl    MPV 9.9 6.0 - 12.0 fL    Platelets 229 140 - 450 10*3/mm3    Neutrophil % 73.4 42.7 - 76.0 %    Lymphocyte % 15.1 (L) 19.6 - 45.3 %    Monocyte % 8.4 5.0 - 12.0 %    Eosinophil % 2.5 0.3 - 6.2 %    Basophil % 0.2 0.0 - 1.5 %    Immature Grans % 0.4 0.0 - 0.5 %    Neutrophils, Absolute 7.39 (H) 1.70 - 7.00 10*3/mm3    Lymphocytes, Absolute 1.52 0.70 - 3.10 10*3/mm3    Monocytes, Absolute 0.85 0.10 - 0.90 10*3/mm3    Eosinophils, Absolute 0.25 0.00 - 0.40 10*3/mm3    Basophils, Absolute 0.02 0.00 - 0.20 10*3/mm3    Immature Grans, Absolute 0.04 0.00 - 0.05 10*3/mm3    nRBC 0.0 0.0 - 0.2 /100 WBC   D-dimer, Quantitative    Collection Time: 07/11/24  7:42 PM    Specimen: Blood   Result Value Ref Range    D-Dimer, Quantitative 6.30 (H) 0.00 - 0.77 MCGFEU/mL       The patient received:  Medications   sodium chloride 0.9 % flush 10 mL (has no administration in time range)   Enoxaparin Sodium (LOVENOX) syringe 100 mg (has no administration in time range)   iopamidol (ISOVUE-370) 76 % injection 100 mL (100 mL Intravenous Given 7/11/24 8063)             ED Disposition       ED Disposition   Decision to Admit    Condition   --    Comment   Level of Care: Telemetry [5]   Diagnosis: Bilateral pulmonary embolism [425929]   Admitting Physician: NATHALIA MONTERO [[id number]]   Attending Physician: NATHALIA MONTERO [[id number]]                     Dragon disclaimer:  Part of this note may be an electronic transcription/translation of spoken language to printed text using the Dragon Dictation System.     I have reviewed the patient’s prescription history via a prescription monitoring program.  This information is consistent with my knowledge of the patient’s controlled substance use history.    Patient evaluate during Coronavirus Pandemic. Isolation practices followed according to [facility] policy.    FINAL IMPRESSION   Diagnosis Plan   1. Bilateral pulmonary embolism        2. Shortness of breath              MD Jerrell Bethea Jr, Thomas Mark Jr., MD  07/11/24 7747

## 2024-07-12 NOTE — PROGRESS NOTES
"    [facility] Medicine Services  INPATIENT PROGRESS NOTE    Patient Name: Holli Iyer  Date of Admission: 7/11/2024  Today's Date: 07/12/24  Length of Stay: 0  Primary Care Physician: Kaley Pearson MD    Subjective   Chief Complaint: Dyspnea with exertion  HPI   She was sitting up in bed.  She is on room air.  Normotensive, not tachycardic.  She tells me that she has not been up out of bed as she \"gets short of breath.\"  Informed her to increase activity as tolerated.  Will obtain walking oximetry.  Denies chest pain.  She had noticed some occasional pain behind her right knee in the last week-week a half.  No history of blood clots.  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 All other systems have been reviewed and are negative unless otherwise stated.     Objective    Temp:  [97.7 °F (36.5 °C)-98.8 °F (37.1 °C)] 98 °F (36.7 °C)  Heart Rate:  [62-70] 70  Resp:  [14-18] 18  BP: (107-128)/(41-67) 107/54  Physical Exam  Vitals reviewed.   Constitutional:       General: She is not in acute distress.     Appearance: She is obese. She is not toxic-appearing.   HENT:      Head: Normocephalic and atraumatic.      Mouth/Throat:      Mouth: Mucous membranes are moist.      Pharynx: Oropharynx is clear.   Eyes:      Extraocular Movements: Extraocular movements intact.      Conjunctiva/sclera: Conjunctivae normal.      Pupils: Pupils are equal, round, and reactive to light.   Cardiovascular:      Rate and Rhythm: Normal rate and regular rhythm.      Pulses: Normal pulses.      Comments: Sinus 62-75  Pulmonary:      Effort: Pulmonary effort is normal. No respiratory distress.      Breath sounds: Normal breath sounds. No wheezing or rhonchi.   Abdominal:      General: Bowel sounds are normal. There is no distension.      " "Palpations: Abdomen is soft.      Tenderness: There is no abdominal tenderness.   Musculoskeletal:         General: No swelling or tenderness. Normal range of motion.      Cervical back: Normal range of motion and neck supple. No muscular tenderness.   Skin:     General: Skin is warm and dry.      Findings: No erythema or rash.   Neurological:      General: No focal deficit present.      Mental Status: She is alert and oriented to person, place, and time.      Cranial Nerves: No cranial nerve deficit.      Motor: No weakness.   Psychiatric:         Mood and Affect: Mood normal.         Behavior: Behavior normal.       Results Review:  I have reviewed the labs, radiology results, and diagnostic studies.    Laboratory Data:   Results from last 7 days   Lab Units 07/12/24  0251 07/11/24  1942   WBC 10*3/mm3 8.91 10.07   HEMOGLOBIN g/dL 11.4* 12.3   HEMATOCRIT % 35.7 38.1   PLATELETS 10*3/mm3 207 229        Results from last 7 days   Lab Units 07/12/24  0251 07/11/24 1942   SODIUM mmol/L 140 139   POTASSIUM mmol/L 3.6 4.0   CHLORIDE mmol/L 104 103   CO2 mmol/L 25.0 27.0   BUN mg/dL 17 19   CREATININE mg/dL 0.76 0.84   CALCIUM mg/dL 8.9 9.3   BILIRUBIN mg/dL  --  0.8   ALK PHOS U/L  --  104   ALT (SGPT) U/L  --  13   AST (SGOT) U/L  --  15   GLUCOSE mg/dL 100* 105*       Culture Data:   No results found for: \"ACANTHNAEG\", \"AFBCX\", \"BPERTUSSISCX\", \"BLOODCX\"  No results found for: \"BCIDPCR\", \"CXREFLEX\", \"CSFCX\", \"CULTURETIS\"  No results found for: \"CULTURES\", \"HSVCX\", \"URCX\"  No results found for: \"EYECULTURE\", \"GCCX\", \"HSVCULTURE\", \"LABHSV\"  No results found for: \"LEGIONELLA\", \"MRSACX\", \"MUMPSCX\", \"MYCOPLASCX\"  No results found for: \"NOCARDIACX\", \"STOOLCX\"  No results found for: \"THROATCX\", \"UNSTIMCULT\", \"URINECX\", \"CULTURE\", \"VZVCULTUR\"  No results found for: \"VIRALCULTU\", \"WOUNDCX\"    Radiology Data:   Imaging Results (Last 24 Hours)       Procedure Component Value Units Date/Time    US Venous Doppler Lower Extremity " Bilateral (duplex) [[id number]] Collected: 07/12/24 1353     Updated: 07/12/24 1357    Narrative:      History: Swelling       Impression:      Impression: There is evidence of deep venous thrombosis in the right  lower extremity.      Comments: Bilateral lower extremity venous duplex exam was performed  using color Doppler flow, Doppler waveform analysis, and grayscale  imaging, with and without compression. There is evidence of deep venous  thrombosis in the popliteal and peroneal veins in the right lower  extremity. There is no evidence of deep venous thrombosis in the left  lower extremity. No thrombus is identified in the saphenofemoral  junctions and greater saphenous veins bilaterally.            This report was signed and finalized on 7/12/2024 1:54 PM by Dr. Angelito Betancur MD.       CT Angiogram Chest [[id number]] Collected: 07/11/24 2135     Updated: 07/11/24 2146    Narrative:      CT ANGIOGRAM CHEST- 7/11/2024 8:18 PM     HISTORY: Elevated D-dimer Short of breath      COMPARISON: None     TOTAL DOSE LENGTH PRODUCT: 260.91 mGy.cm. Automated exposure control was  also utilized to decrease patient radiation dose.     TECHNIQUE: Axial images of the chest are performed following IV  contrast. 2D, 3D MIPS reconstructed images are reviewed     FINDINGS: There are diffuse bilateral pulmonary arteries involving each  lobar pulmonary artery. There is no central saddle embolus identified.  The RV to LV ratio measuring approximately 1.13. No thoracic aortic  aneurysm or dissection. Cardiomegaly. No pericardial effusion. No  pathologic intrathoracic Raxone lymphadenopathy.     Images the upper abdomen demonstrate no adrenal nodules. Peripelvic  renal cysts.     Mosaic attenuation of the pulmonary parenchyma with no dense  consolidation. Peripheral subpleural opacities of the inferior right  middle lobe may relate to ischemia/infarct. No pneumothorax. No  endobronchial lesion.     Degenerative changes of the regional  skeleton. Chronic appearing  compression of the L1 vertebra. No focal aggressive regional bony  lesions.          Impression:      1. Diffuse bilateral pulmonary emboli involving all 5 segmental  pulmonary arteries. No central saddle embolus. RV to LV ratio measuring  1.13. This finding called to Dr. Allan in the ER at 9:42 p.m. on  7/11/2024.  2. Mosaic attenuation lung parenchyma relate to air trapping. Peripheral  inferior right middle lobe opacities may represent ischemic change.  3. Cardiomegaly.     This report was signed and finalized on 7/11/2024 9:43 PM by Dr. Martha Rivers MD.       XR Chest 1 View [[id number]] Collected: 07/11/24 1929     Updated: 07/11/24 1933    Narrative:      XR CHEST 1 VW-     HISTORY: SOA Triage Protocol     COMPARISON: 2/3/2021     FINDINGS: Frontal view the chest obtained.     Diminished level of inspiration. Cardiomegaly with vascular crowding  versus mild venous congestion. Mild elevation right hemidiaphragm. No  pleural effusion or pneumothorax. No acute regional bony pathology.  Degenerative change thoracic spine.       Impression:      1. Low lung volumes with cardiomegaly and vascular crowding versus mild  venous congestion.        This report was signed and finalized on 7/11/2024 7:30 PM by Dr. Martha Rivers MD.               I have reviewed the patient's current medications.     Assessment/Plan   Assessment  Active Hospital Problems    Diagnosis     **Bilateral pulmonary embolism     Acute deep vein thrombosis (DVT) of right lower extremity     Gluten intolerance     Mitral valve prolapse     Benign hypertension     GILL (obstructive sleep apnea)     Gastroesophageal reflux disease without esophagitis      Ms. Iyer is a 79-year-old female who [facility] with 4 to 5-day history of shortness of breath.  She went to see her primary care provider on 7/11, oxygen noted to be 89% on room air.  She was referred to the ED for further  evaluation.  In the ED, D-dimer 6.3.  CTA chest showed bilateral pulmonary emboli in 5 segmental pulmonary arteries, RV to LV radial 1.13.  Normotensive, not tachycardic, on room air.  BNP and troponin normal. Patient denies chest pain. No recent travel. No family or personal history of thromboembolic disease. 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  Therapeutic Lovenox started.    Treatment Plan  Bilateral pulmonary embolism. Venous duplex shows here is evidence of deep venous thrombosis in the popliteal and peroneal veins in the right lower extremity. Normotensive, not tachycardic, on room air.  BNP and troponin normal. Patient denies chest pain. No recent travel. No family or personal history of thromboembolic disease. 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  Therapeutic Lovenox started.     Results for orders placed during the hospital encounter of 07/11/24    Adult Transthoracic Echo Complete W/ Cont if Necessary Per Protocol    Interpretation Summary    Left ventricular systolic function is normal. Left ventricular ejection fraction appears to be 61 - 65%.    The right ventricle appears to be normal in size with low normal systolic function.    No significant valvular abnormalities identified on this study.    Continue Lovenox with plans to transition to DOAC at time of discharge.    Walking oximetry today reveals no oxygen needs at this time.      Increase activity as tolerated, up to chair, ambulate with RN.    Medical Decision Making  Number and Complexity of problems: 1 acute problem  Differential Diagnosis: None considered at present    Conditions and Status        Condition is unchanged.     [facility] Data  External documents reviewed: Prior Psychiatric records  Cardiac tracing (EKG, telemetry) interpretation: Reviewed  Radiology interpretation: Interpreted by radiology  Labs reviewed: As above  Any tests that were  considered but not ordered: None considered at present     Decision rules/scores evaluated (example [id number], Wells, etc): None considered at present     Discussed with: Patient and Dr. Martin     Care Planning  Shared decision making: Patient is agreeable to ongoing workup and treatment  Code status and discussions: Full code with full interventions    Disposition  Social Determinants of Health that impact treatment or disposition: none  I expect the patient to be discharged to home likely tomorrow.    Electronically signed by DAVID Nazario, 07/12/24, 14:03 CDT.

## 2024-07-12 NOTE — PLAN OF CARE
Goal Outcome Evaluation:  Plan of Care Reviewed With: patient        Progress: no change  Outcome Evaluation: Pt admitted from the ED, she is alert and oriented, no c/o SOB or pain at this time. VSS

## 2024-07-12 NOTE — PLAN OF CARE
Goal Outcome Evaluation:  Plan of Care Reviewed With: patient        Progress: no change  Outcome Evaluation: Pt admitted from the ED, she is alert and oriented, no c/o SOB or pain at this time. VSS  Telemetry SR 66-72. No c/o SOB, pain or discomfort during this shift. No acute events. Pt NPO as ordered. Pt using BSC due to Bilateral PE's, she was given Lovenox in ED prior to arrival on the floor. Care has been explained to pt, she has a lot of questions, I have answered some for her but I expect more as this is all new to her. While going over pt meds, Aspirin was listed, pt states her MD took her off of aspirin because it was not needed. Pt states she lives a sedentary lifestyle. She does crafts and is often sitting on a stool for hours at a time without getting up.

## 2024-07-12 NOTE — CASE MANAGEMENT/SOCIAL WORK
Discharge Planning Assessment  [facility]     Patient Name: Holli Iyer  MRN: [id number]  Today's Date: 7/12/2024    Admit Date: 7/11/2024        Discharge Needs Assessment       Row Name 07/12/24 0918       Living Environment    People in Home significant other    Name(s) of People in Home Yong    Current Living Arrangements home    Primary Care Provided by self    Provides Primary Care For no one    Family Caregiver if Needed significant other    Family Caregiver Names Yong    Able to Return to Prior Arrangements yes       Resource/Environmental Concerns    Resource/Environmental Concerns none       Transition Planning    Patient/Family Anticipates Transition to home with family    Transportation Anticipated family or friend will provide       Discharge Needs Assessment    Readmission Within the Last 30 Days no previous admission in last 30 days    Equipment Currently Used at Home cpap    Concerns to be Addressed no discharge needs identified    Equipment Needed After Discharge none    Discharge Coordination/Progress spoke to patient who lives with significant other and patient is independent at home prior to illness; has RX coverage and PCP; will follow for DC needs                   Discharge Plan    No documentation.                 Continued Care and Services - Admitted Since 7/11/2024    No active coordination exists for this encounter.          Demographic Summary    No documentation.                  Functional Status    No documentation.                  Psychosocial    No documentation.                  Abuse/Neglect    No documentation.                  Legal    No documentation.                  Substance Abuse    No documentation.                  Patient Forms    No documentation.                     Lucille Iniguez, LORNE

## 2024-07-12 NOTE — ED NOTES
"Nursing report ED to floor  Holli Iyer  79 y.o.  female    HPI:   Chief Complaint   Patient presents with    Shortness of Breath       Admitting doctor:   Gerardo Shukla MD    Consulting provider(s):  Consults       No orders found from 6/12/2024 to 7/12/2024.             Admitting diagnosis:   The primary encounter diagnosis was Bilateral pulmonary embolism. A diagnosis of Shortness of breath was also pertinent to this visit.    Code status:   Current Code Status       Date Active Code Status Order ID Comments User Context       Not on file            Allergies:   Declomycin [demeclocycline] and Sulfa antibiotics    Intake and Output  No intake or output data in the 24 hours ending 07/11/24 2212    Weight:       07/11/24 1849   Weight: 95.3 kg (210 lb 1.6 oz)       Most recent vitals:   Vitals:    07/11/24 1849   BP: 120/67   BP Location: Right arm   Patient Position: Sitting   Pulse: 70   Resp: 14   Temp: 98.6 °F (37 °C)   TempSrc: Oral   SpO2: 95%   Weight: 95.3 kg (210 lb 1.6 oz)   Height: 170.2 cm (67.01\")     Oxygen Therapy: .    Active LDAs/IV Access:   Lines, Drains & Airways       Active LDAs       Name Placement date Placement time Site Days    Peripheral IV 07/11/24 1939 Anterior;Distal;Left Forearm 07/11/24 1939  Forearm  less than 1                    Labs (abnormal labs have a star):   Labs Reviewed   COMPREHENSIVE METABOLIC PANEL - Abnormal; Notable for the following components:       Result Value    Glucose 105 (*)     All other components within normal limits    Narrative:     GFR Normal >60  Chronic Kidney Disease <60  Kidney Failure <15    The GFR formula is only valid for adults with stable renal function between ages 18 and 70.   CBC WITH AUTO DIFFERENTIAL - Abnormal; Notable for the following components:    Lymphocyte % 15.1 (*)     Neutrophils, Absolute 7.39 (*)     All other components within normal limits   D-DIMER, QUANTITATIVE - Abnormal; Notable for the following components: " "   D-Dimer, Quantitative 6.30 (*)     All other components within normal limits    Narrative:     According to the assay 's published package insert, a normal (<0.50 MCGFEU/mL) D-dimer result in conjunction with a non-high clinical probability assessment, excludes deep vein thrombosis (DVT) and pulmonary embolism (PE) with high sensitivity.    D-dimer values increase with age and this can make VTE exclusion of an older population difficult. To address this, the American College [employer], based on best available evidence and recent guidelines, recommends that clinicians use age-adjusted D-dimer thresholds in patients greater than 50 years of age with: a) a low probability of PE who do not meet all Pulmonary Embolism Rule Out Criteria, or b) in those with intermediate probability of PE.   The formula for an age-adjusted D-dimer cut-off is \"age/100\".  For example, a 60 year old patient would have an age-adjusted cut-off of 0.60 MCGFEU/mL and an 80 year old 0.80 MCGFEU/mL.   BNP (IN-HOUSE) - Normal    Narrative:     This assay is used as an aid in the diagnosis of individuals suspected of having heart failure. It can be used as an aid in the diagnosis of acute decompensated heart failure (ADHF) in patients presenting with signs and symptoms of ADHF to the emergency department (ED). In addition, NT-proBNP of <300 pg/mL indicates ADHF is not likely.    Age Range Result Interpretation  NT-proBNP Concentration (pg/mL:      <50             Positive            >450                   Gray                 300-450                    Negative             <300    50-75           Positive            >900                  Gray                300-900                  Negative            <300      >75             Positive            >1800                  Gray                300-1800                  Negative            <300   SINGLE HS TROPONIN T - Normal    Narrative:     High Sensitive Troponin T Reference " Range:  <14.0 ng/L- Negative Female for AMI  <22.0 ng/L- Negative Male for AMI  >=14 - Abnormal Female indicating possible myocardial injury.  >=22 - Abnormal Male indicating possible myocardial injury.   Clinicians would have to utilize clinical acumen, EKG, Troponin, and serial changes to determine if it is an Acute Myocardial Infarction or myocardial injury due to an underlying chronic condition.        RAINBOW DRAW    Narrative:     The following orders were created for panel order [place] Draw.  Procedure                               Abnormality         Status                     ---------                               -----------         ------                     Green Top (Gel)[[id number]]                                  Final result               Lavender Top[[id number]]                                     Final result               Red Top[[id number]]                                          Final result               [place] Blood Culture Chava...[[id number]]                      Final result               Gray Top[[id number]]                                         Final result               Light Blue Top[[id number]]                                   Final result                 Please view results for these tests on the individual orders.   CBC AND DIFFERENTIAL    Narrative:     The following orders were created for panel order CBC & Differential.  Procedure                               Abnormality         Status                     ---------                               -----------         ------                     CBC Auto Differential[[id number]]        Abnormal            Final result                 Please view results for these tests on the individual orders.   GREEN TOP   LAVENDER TOP   RED TOP   RAINBOW BLOOD CULTURE BOTTLES - 1 SET   GRAY TOP   LIGHT BLUE TOP       Meds given in ED:   Medications   sodium chloride 0.9 % flush 10 mL (has no administration in time range)   Enoxaparin Sodium (LOVENOX)  syringe 100 mg (has no administration in time range)   iopamidol (ISOVUE-370) 76 % injection 100 mL (100 mL Intravenous Given 7/11/24 2132)           NIH Stroke Scale:       Isolation/Infection(s):  No active isolations   No active infections     COVID Testing  Collected .  Resulted .    Nursing report ED to floor:  Mental status: . axox4  Ambulatory status: bedrest  \    Precautions: .    ED nurse phone extentsion- ..   \

## 2024-07-13 VITALS
BODY MASS INDEX: 30.92 KG/M2 | TEMPERATURE: 97.6 F | WEIGHT: 197 LBS | OXYGEN SATURATION: 94 % | DIASTOLIC BLOOD PRESSURE: 61 MMHG | RESPIRATION RATE: 18 BRPM | HEART RATE: 92 BPM | HEIGHT: 67 IN | SYSTOLIC BLOOD PRESSURE: 119 MMHG

## 2024-07-13 LAB
ANION GAP SERPL CALCULATED.3IONS-SCNC: 10 MMOL/L (ref 5–15)
BASOPHILS # BLD AUTO: 0.02 10*3/MM3 (ref 0–0.2)
BASOPHILS NFR BLD AUTO: 0.3 % (ref 0–1.5)
BUN SERPL-MCNC: 18 MG/DL (ref 8–23)
BUN/CREAT SERPL: 23.1 (ref 7–25)
CALCIUM SPEC-SCNC: 8.9 MG/DL (ref 8.6–10.5)
CHLORIDE SERPL-SCNC: 105 MMOL/L (ref 98–107)
CO2 SERPL-SCNC: 25 MMOL/L (ref 22–29)
CREAT SERPL-MCNC: 0.78 MG/DL (ref 0.57–1)
DEPRECATED RDW RBC AUTO: 41.4 FL (ref 37–54)
EGFRCR SERPLBLD CKD-EPI 2021: 78.3 ML/MIN/1.73
EOSINOPHIL # BLD AUTO: 0.26 10*3/MM3 (ref 0–0.4)
EOSINOPHIL NFR BLD AUTO: 3.5 % (ref 0.3–6.2)
ERYTHROCYTE [DISTWIDTH] IN BLOOD BY AUTOMATED COUNT: 13.1 % (ref 12.3–15.4)
GLUCOSE SERPL-MCNC: 104 MG/DL (ref 65–99)
HCT VFR BLD AUTO: 37 % (ref 34–46.6)
HGB BLD-MCNC: 11.8 G/DL (ref 12–15.9)
IMM GRANULOCYTES # BLD AUTO: 0.03 10*3/MM3 (ref 0–0.05)
IMM GRANULOCYTES NFR BLD AUTO: 0.4 % (ref 0–0.5)
LYMPHOCYTES # BLD AUTO: 1.52 10*3/MM3 (ref 0.7–3.1)
LYMPHOCYTES NFR BLD AUTO: 20.2 % (ref 19.6–45.3)
MCH RBC QN AUTO: 28 PG (ref 26.6–33)
MCHC RBC AUTO-ENTMCNC: 31.9 G/DL (ref 31.5–35.7)
MCV RBC AUTO: 87.9 FL (ref 79–97)
MONOCYTES # BLD AUTO: 0.76 10*3/MM3 (ref 0.1–0.9)
MONOCYTES NFR BLD AUTO: 10.1 % (ref 5–12)
NEUTROPHILS NFR BLD AUTO: 4.93 10*3/MM3 (ref 1.7–7)
NEUTROPHILS NFR BLD AUTO: 65.5 % (ref 42.7–76)
NRBC BLD AUTO-RTO: 0 /100 WBC (ref 0–0.2)
PLATELET # BLD AUTO: 208 10*3/MM3 (ref 140–450)
PMV BLD AUTO: 10.7 FL (ref 6–12)
POTASSIUM SERPL-SCNC: 4.1 MMOL/L (ref 3.5–5.2)
QT INTERVAL: 418 MS
QTC INTERVAL: 447 MS
RBC # BLD AUTO: 4.21 10*6/MM3 (ref 3.77–5.28)
SODIUM SERPL-SCNC: 140 MMOL/L (ref 136–145)
WBC NRBC COR # BLD AUTO: 7.52 10*3/MM3 (ref 3.4–10.8)

## 2024-07-13 PROCEDURE — 85025 COMPLETE CBC W/AUTO DIFF WBC: CPT | Performed by: FAMILY MEDICINE

## 2024-07-13 PROCEDURE — G0378 HOSPITAL OBSERVATION PER HR: HCPCS

## 2024-07-13 PROCEDURE — 80048 BASIC METABOLIC PNL TOTAL CA: CPT | Performed by: FAMILY MEDICINE

## 2024-07-13 RX ORDER — APIXABAN 5 MG (74)
5 KIT ORAL SEE ADMIN INSTRUCTIONS
Qty: 74 TABLET | Refills: 0 | Status: SHIPPED | OUTPATIENT
Start: 2024-07-13

## 2024-07-13 RX ORDER — APIXABAN 5 MG (74)
5 KIT ORAL SEE ADMIN INSTRUCTIONS
Qty: 74 TABLET | Refills: 0 | Status: SHIPPED | OUTPATIENT
Start: 2024-07-13 | End: 2024-07-13 | Stop reason: HOSPADM

## 2024-07-13 RX ORDER — MONTELUKAST SODIUM 4 MG/1
4 TABLET, CHEWABLE ORAL NIGHTLY
Start: 2024-07-13

## 2024-07-13 RX ADMIN — ATENOLOL 25 MG: 25 TABLET ORAL at 10:56

## 2024-07-13 RX ADMIN — TRIAMTERENE AND HYDROCHLOROTHIAZIDE 1 TABLET: 37.5; 25 TABLET ORAL at 10:51

## 2024-07-13 RX ADMIN — Medication 10 ML: at 10:53

## 2024-07-13 RX ADMIN — APIXABAN 10 MG: 5 TABLET, FILM COATED ORAL at 10:52

## 2024-07-13 RX ADMIN — CITALOPRAM 10 MG: 10 TABLET, FILM COATED ORAL at 10:51

## 2024-07-13 NOTE — PLAN OF CARE
Goal Outcome Evaluation:  Plan of Care Reviewed With: patient, spouse        Progress: no change  Outcome Evaluation: Pt still asking a lot of questions regarding her condition. She is nervous about discharging. Pt was educated on diet, exercise and medications to prevent blood clots in the future. Pt spouse in the room during this conversation at beginning of shift. VSS, telemetry SR 68-95. No c/o pain or discomfort and no acute events during this shift.

## 2024-07-13 NOTE — DISCHARGE SUMMARY
[facility] Medicine Services  DISCHARGE SUMMARY     Date of Admission: 7/11/2024  Date of Discharge:  7/13/2024  Primary Care Physician: Kaley Pearson MD    Presenting Problem/History of Present Illness:  Shortness of breath    Final Discharge Diagnoses:  Active Hospital Problems    Diagnosis     **Acute diffuse bilateral pulmonary emboli involving all 5 segmental  pulmonary arteries. No central saddle embolus, withoutcor pulmonale     Acute deep vein thrombosis (DVT) of right lower extremity, popliteal and peroneal veins     Gluten intolerance     Mitral valve prolapse     Benign hypertension     GILL (obstructive sleep apnea)     Gastroesophageal reflux disease without esophagitis        Consults: None    Procedures Performed: None    Pertinent Test Results:   Results for orders placed during the hospital encounter of 07/11/24    Adult Transthoracic Echo Complete W/ Cont if Necessary Per Protocol    Interpretation Summary    Left ventricular systolic function is normal. Left ventricular ejection fraction appears to be 61 - 65%.    The right ventricle appears to be normal in size with low normal systolic function.    No significant valvular abnormalities identified on this study.    Date: 07/12/24                                                                                                     ROOM AIR BASELINE   SpO2% 93   Heart Rate 86   Blood Pressure       EXERCISE ON ROOM AIR SpO2% EXERCISE ON O2 @  LPM SpO2%   1 MINUTE 93 1 MINUTE     2 MINUTES 93 2 MINUTES     3 MINUTES 92 3 MINUTES     4 MINUTES 90 4 MINUTES     5 MINUTES 90 5 MINUTES     6 MINUTES 92 6 MINUTES                                                                                                                   Distance Walked  600 ft Distance Walked   Dyspnea (Mayra Scale)   Dyspnea (Mayra Scale)   Fatigue (Mayra Scale)   Fatigue (Mayra Scale)   SpO2% Post Exercise  91 SpO2% Post Exercise   HR Post Exercise   88 HR Post Exercise   Time to Recovery  less than 1 min Time to Recovery      Comments: Pt does not qualify for oxygen at this time                  Imaging Results (All)       Procedure Component Value Units Date/Time    US Venous Doppler Lower Extremity Bilateral (duplex) [[id number]] Collected: 07/12/24 1353     Updated: 07/12/24 1357    Narrative:      History: Swelling       Impression:      Impression: There is evidence of deep venous thrombosis in the right  lower extremity.      Comments: Bilateral lower extremity venous duplex exam was performed  using color Doppler flow, Doppler waveform analysis, and grayscale  imaging, with and without compression. There is evidence of deep venous  thrombosis in the popliteal and peroneal veins in the right lower  extremity. There is no evidence of deep venous thrombosis in the left  lower extremity. No thrombus is identified in the saphenofemoral  junctions and greater saphenous veins bilaterally.            This report was signed and finalized on 7/12/2024 1:54 PM by Dr. Angelito Betancur MD.       CT Angiogram Chest [[id number]] Collected: 07/11/24 2135     Updated: 07/11/24 2146    Narrative:      CT ANGIOGRAM CHEST- 7/11/2024 8:18 PM     HISTORY: Elevated D-dimer Short of breath      COMPARISON: None     TOTAL DOSE LENGTH PRODUCT: 260.91 mGy.cm. Automated exposure control was  also utilized to decrease patient radiation dose.     TECHNIQUE: Axial images of the chest are performed following IV  contrast. 2D, 3D MIPS reconstructed images are reviewed     FINDINGS: There are diffuse bilateral pulmonary arteries involving each  lobar pulmonary artery. There is no central saddle embolus identified.  The RV to LV ratio measuring approximately 1.13. No thoracic aortic  aneurysm or dissection. Cardiomegaly. No pericardial effusion. No  pathologic intrathoracic Raxone lymphadenopathy.     Images the upper abdomen demonstrate no adrenal nodules. Peripelvic  renal cysts.      Mosaic attenuation of the pulmonary parenchyma with no dense  consolidation. Peripheral subpleural opacities of the inferior right  middle lobe may relate to ischemia/infarct. No pneumothorax. No  endobronchial lesion.     Degenerative changes of the regional skeleton. Chronic appearing  compression of the L1 vertebra. No focal aggressive regional bony  lesions.          Impression:      1. Diffuse bilateral pulmonary emboli involving all 5 segmental  pulmonary arteries. No central saddle embolus. RV to LV ratio measuring  1.13. This finding called to Dr. Allan in the ER at 9:42 p.m. on  7/11/2024.  2. Mosaic attenuation lung parenchyma relate to air trapping. Peripheral  inferior right middle lobe opacities may represent ischemic change.  3. Cardiomegaly.     This report was signed and finalized on 7/11/2024 9:43 PM by Dr. Martha Rivers MD.       XR Chest 1 View [[id number]] Collected: 07/11/24 1929     Updated: 07/11/24 1933    Narrative:      XR CHEST 1 VW-     HISTORY: SOA Triage Protocol     COMPARISON: 2/3/2021     FINDINGS: Frontal view the chest obtained.     Diminished level of inspiration. Cardiomegaly with vascular crowding  versus mild venous congestion. Mild elevation right hemidiaphragm. No  pleural effusion or pneumothorax. No acute regional bony pathology.  Degenerative change thoracic spine.       Impression:      1. Low lung volumes with cardiomegaly and vascular crowding versus mild  venous congestion.        This report was signed and finalized on 7/11/2024 7:30 PM by Dr. Martha Rivers MD.             LAB RESULTS:      Lab 07/13/24  0323 07/12/24  0251 07/11/24  1942   WBC 7.52 8.91 10.07   HEMOGLOBIN 11.8* 11.4* 12.3   HEMATOCRIT 37.0 35.7 38.1   PLATELETS 208 207 229   NEUTROS ABS 4.93 5.93 7.39*   IMMATURE GRANS (ABS) 0.03 0.02 0.04   LYMPHS ABS 1.52 1.91 1.52   MONOS ABS 0.76 0.76 0.85   EOS ABS 0.26 0.27 0.25   MCV 87.9 86.2 85.6   PROTIME  --   --  13.8   D DIMER QUANT  --   --   6.30*         Lab 07/13/24  0323 07/12/24 2037 07/12/24  1558 07/12/24  0251 07/11/24 1942   SODIUM 140  --   --  140 139   POTASSIUM 4.1 4.7 3.9 3.6 4.0   CHLORIDE 105  --   --  104 103   CO2 25.0  --   --  25.0 27.0   ANION GAP 10.0  --   --  11.0 9.0   BUN 18  --   --  17 19   CREATININE 0.78  --   --  0.76 0.84   EGFR 78.3  --   --  80.8 71.7   GLUCOSE 104*  --   --  100* 105*   CALCIUM 8.9  --   --  8.9 9.3         Lab 07/11/24 1942   TOTAL PROTEIN 7.2   ALBUMIN 4.2   GLOBULIN 3.0   ALT (SGPT) 13   AST (SGOT) 15   BILIRUBIN 0.8   ALK PHOS 104         Lab 07/11/24 1942   PROBNP 111.6   HSTROP T 10   PROTIME 13.8   INR 1.02                 Brief Urine Lab Results  (Last result in the past 365 days)        Color   Clarity   Blood   Leuk Est   Nitrite   Protein   CREAT   Urine HCG        03/25/24 1143 YELLOW   Clear   Negative   MODERATE  Comment: Culture Urine under CMS guidelines and criteria will auto reflex on a sole  criteria that is based on manual microscopic count of more than 10/hpf for  WBC. If Culture Urine is warranted aside from this criteria, place a  requisition or order within 24 hours of collection.   Negative                   Microbiology Results (last 10 days)       ** No results found for the last 240 hours. **            Hospital Course: Holli Iyer presented to [facility] emergency room 7/11/2024 with shortness of breath for the past 4 to 5 days. She saw her primary care provider for follow-up appointment and she was advised to walk around the building and noticed that her saturation went down to 89% on room air.  She was referred to the emergency room.  In ED, D-dimer 6.3.  CT angiogram of the chest showed bilateral pulmonary emboli in 5 segmental pulmonary arteries, RV to LV ratio 1.13.  Patient was normotensive and not tachycardic.  proBNP and troponin normal.  Patient denied chest pain, recent travel.  She denied family history or personal history of thromboembolic disease.   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  Chest x-ray showed low lung volumes with cardiomegaly and vascular crowding versus mild venous congestion.  Lovenox 1 mg/kg given in ER.    She was admitted to the telemetry floor with bilateral pulmonary emboli.  Lovenox 1 mg/kg every 12 hours continued.  She was placed on telemetry and remained normal sinus rhythm.  Patient was not hypoxic and did not require any oxygen.  Heart rate remained stable 62-78.  Walking oximetry on room air 7/12/24 saturation 93% on room air and patient ambulated 600 feet and saturation maintained greater than 90%.  Patient will not require oxygen at discharge.    Results for orders placed during the hospital encounter of 07/11/24    Adult Transthoracic Echo Complete W/ Cont if Necessary Per Protocol    Interpretation Summary    Left ventricular systolic function is normal. Left ventricular ejection fraction appears to be 61 - 65%.    The right ventricle appears to be normal in size with low normal systolic function.    No significant valvular abnormalities identified on this study.     Venous duplex bilateral lower extremities noted deep vein thrombosis right lower extremity popliteal and peroneal veins.    Patient remained on Lovenox and was transitioned to Eliquis 10 mg twice daily for 7 days beginning 7/13/2024 then Eliquis 5 mg orally twice daily beginning 7/20/2024.  Discussed with patient that Dr. Pearson may consider hematology consult and hypercoagulable workup as an outpatient.    Atenolol and Dyazide continued for primary hypertension.  Blood pressure 129/49.    Patient wears CPAP at night for GILL.    On 7/13/2024, she is stable for discharge.  She presented with shortness of breath and found to have DVT right lower extremity and bilateral pulmonary emboli.  Patient was started on Lovenox and transitioned to Eliquis 10 mg twice daily for 7 days on 7/13/2024 and then Eliquis 5 mg  "twice daily beginning 7/20/2024.  Patient will follow-up with Dr. Kaley Pearson in 1 week to consider hematology consultation and hypercoagulable workup.  She has been advised to avoid sedentary lifestyle.    Physical Exam on Discharge:  /49 (BP Location: Right arm, Patient Position: Sitting)   Pulse 78   Temp 97.6 °F (36.4 °C) (Oral)   Resp 18   Ht 170.2 cm (67\")   Wt 89.4 kg (197 lb)   SpO2 94%   BMI 30.85 kg/m²   Physical Exam  Vitals and nursing note reviewed.   Constitutional:       Comments: Sitting up in bed.  No oxygen in use.  No visitors in room.   HENT:      Head: Normocephalic and atraumatic.      Nose: No congestion.      Mouth/Throat:      Pharynx: Oropharynx is clear. No oropharyngeal exudate or posterior oropharyngeal erythema.   Eyes:      Extraocular Movements: Extraocular movements intact.      Pupils: Pupils are equal, round, and reactive to light.   Cardiovascular:      Rate and Rhythm: Normal rate and regular rhythm.      Heart sounds: No murmur heard.     Comments: Sinus rhythm 68 on telemetry  Pulmonary:      Breath sounds: No wheezing, rhonchi or rales.      Comments: No oxygen in use.  Saturation 94% on room air.  Abdominal:      Palpations: Abdomen is soft.      Tenderness: There is no abdominal tenderness.   Genitourinary:     Comments: Voiding.  Musculoskeletal:         General: No swelling or tenderness.      Cervical back: Normal range of motion and neck supple.   Skin:     General: Skin is warm and dry.   Neurological:      General: No focal deficit present.      Mental Status: She is alert and oriented to person, place, and time.   Psychiatric:         Mood and Affect: Mood normal.         Behavior: Behavior normal.         Thought Content: Thought content normal.         Judgment: Judgment normal.         Condition on Discharge: Stable for discharge home    Discharge Disposition:  Home or Self Care    Discharge Medications:     Discharge Medications        New " Medications        Instructions Start Date   Eliquis DVT/PE Starter Pack tablet therapy pack  Generic drug: Apixaban Starter Pack   5 mg, Oral, See Admin Instructions             Continue These Medications        Instructions Start Date   atenolol 50 MG tablet  Commonly known as: TENORMIN   50 mg, Oral, Daily      escitalopram 10 MG tablet  Commonly known as: LEXAPRO   10 mg, Oral, Daily      meloxicam 7.5 MG tablet  Commonly known as: MOBIC   15 mg, Oral, Daily PRN      montelukast 4 MG chewable tablet  Commonly known as: SINGULAIR   4 mg, Oral, Nightly      montelukast 10 MG tablet  Commonly known as: SINGULAIR   10 mg, Oral, Nightly      triamterene-hydrochlorothiazide 37.5-25 MG per capsule  Commonly known as: DYAZIDE   1 capsule, Oral, Daily      vitamin D 1.25 MG (64556 UT) capsule capsule  Commonly known as: ERGOCALCIFEROL   50,000 Units, Oral, Every 7 Days                 Discharge Diet:   Diet Instructions       Diet: Regular/House Diet; Regular (IDDSI 7); Thin (IDDSI 0)      Discharge Diet: Regular/House Diet    Texture: Regular (IDDSI 7)    Fluid Consistency: Thin (IDDSI 0)            Activity at Discharge:   Activity Instructions       Activity as Tolerated              Discharge instructions:  1.  For worsening shortness of breath seek medical attention.  2.  Eliquis 10 mg orally twice daily for 7 days begin 7/13/2024, then Eliquis 5 mg twice daily beginning 7/20/2024.  3.  Follow-up with Dr. Kaley Pearson in 1 week.  Consider hematology consultation, hypercoagulable workup as outpatient.    Follow-up Appointments:   Dr. Kaley Pearson 1 week.    Test Results Pending at Discharge: None    Electronically signed by DAVID Granger, 07/13/24, 10:04 CDT.    Time: 35 minutes.  Discussed with Dr. Martin and patient.    The above documentation resulted from a face-to-face encounter by me Bria HERNANDEZ, [facility].

## 2024-07-15 ENCOUNTER — OFFICE VISIT (OUTPATIENT)
Dept: INTERNAL MEDICINE | Age: 79
End: 2024-07-15

## 2024-07-15 VITALS
WEIGHT: 197 LBS | DIASTOLIC BLOOD PRESSURE: 54 MMHG | OXYGEN SATURATION: 95 % | BODY MASS INDEX: 30.92 KG/M2 | HEIGHT: 67 IN | SYSTOLIC BLOOD PRESSURE: 110 MMHG | HEART RATE: 76 BPM

## 2024-07-15 DIAGNOSIS — I10 PRIMARY HYPERTENSION: ICD-10-CM

## 2024-07-15 DIAGNOSIS — Z09 HOSPITAL DISCHARGE FOLLOW-UP: ICD-10-CM

## 2024-07-15 DIAGNOSIS — J45.20 MILD INTERMITTENT ASTHMA WITHOUT COMPLICATION: ICD-10-CM

## 2024-07-15 DIAGNOSIS — I26.99 BILATERAL PULMONARY EMBOLISM (HCC): Primary | ICD-10-CM

## 2024-07-28 PROBLEM — I26.99 BILATERAL PULMONARY EMBOLISM (HCC): Status: ACTIVE | Noted: 2024-07-28

## 2024-07-30 DIAGNOSIS — J30.89 NON-SEASONAL ALLERGIC RHINITIS DUE TO OTHER ALLERGIC TRIGGER: ICD-10-CM

## 2024-07-30 RX ORDER — MONTELUKAST SODIUM 10 MG/1
10 TABLET ORAL DAILY
Qty: 30 TABLET | Refills: 3 | Status: SHIPPED | OUTPATIENT
Start: 2024-07-30

## 2024-08-12 ENCOUNTER — TELEPHONE (OUTPATIENT)
Dept: INTERNAL MEDICINE | Age: 79
End: 2024-08-12

## 2024-08-13 NOTE — PROGRESS NOTES
Patient texted me yesterday (could not get through office line)--- needs eliquis renewed and I had sent xarelto -- please call [facility] and ask them to cancel the xarelto I sent yesterday and fill the eliquis 5mg twice a day instead

## 2024-09-04 DIAGNOSIS — I26.99 BILATERAL PULMONARY EMBOLISM (HCC): Primary | ICD-10-CM

## 2024-10-09 ENCOUNTER — OFFICE VISIT (OUTPATIENT)
Dept: INTERNAL MEDICINE | Age: 79
End: 2024-10-09
Payer: MEDICARE

## 2024-10-09 VITALS
BODY MASS INDEX: 31.67 KG/M2 | DIASTOLIC BLOOD PRESSURE: 82 MMHG | WEIGHT: 201.8 LBS | OXYGEN SATURATION: 94 % | SYSTOLIC BLOOD PRESSURE: 120 MMHG | HEART RATE: 67 BPM | HEIGHT: 67 IN

## 2024-10-09 DIAGNOSIS — R19.03 RIGHT LOWER QUADRANT ABDOMINAL SWELLING, MASS AND LUMP: ICD-10-CM

## 2024-10-09 DIAGNOSIS — I10 PRIMARY HYPERTENSION: ICD-10-CM

## 2024-10-09 DIAGNOSIS — E03.9 ACQUIRED HYPOTHYROIDISM: ICD-10-CM

## 2024-10-09 DIAGNOSIS — E55.9 VITAMIN D DEFICIENCY: ICD-10-CM

## 2024-10-09 DIAGNOSIS — M25.562 PAIN IN BOTH KNEES, UNSPECIFIED CHRONICITY: ICD-10-CM

## 2024-10-09 DIAGNOSIS — N83.201 CYST OF RIGHT OVARY: ICD-10-CM

## 2024-10-09 DIAGNOSIS — M25.561 PAIN IN BOTH KNEES, UNSPECIFIED CHRONICITY: ICD-10-CM

## 2024-10-09 DIAGNOSIS — F41.9 ANXIETY: ICD-10-CM

## 2024-10-09 DIAGNOSIS — F32.9 REACTIVE DEPRESSION: ICD-10-CM

## 2024-10-09 DIAGNOSIS — I26.99 BILATERAL PULMONARY EMBOLISM (HCC): Primary | ICD-10-CM

## 2024-10-09 DIAGNOSIS — I26.99 BILATERAL PULMONARY EMBOLISM (HCC): ICD-10-CM

## 2024-10-09 LAB
25(OH)D3 SERPL-MCNC: 57.7 NG/ML
ALBUMIN SERPL-MCNC: 4.3 G/DL (ref 3.5–5.2)
ALP SERPL-CCNC: 102 U/L (ref 35–104)
ALT SERPL-CCNC: 23 U/L (ref 5–33)
ANION GAP SERPL CALCULATED.3IONS-SCNC: 9 MMOL/L (ref 7–19)
AST SERPL-CCNC: 21 U/L (ref 5–32)
BILIRUB SERPL-MCNC: 0.7 MG/DL (ref 0.2–1.2)
BUN SERPL-MCNC: 19 MG/DL (ref 8–23)
CALCIUM SERPL-MCNC: 9.5 MG/DL (ref 8.8–10.2)
CANCER AG125 SERPL-ACNC: 6 U/ML (ref 0–38)
CHLORIDE SERPL-SCNC: 103 MMOL/L (ref 98–111)
CO2 SERPL-SCNC: 28 MMOL/L (ref 22–29)
CREAT SERPL-MCNC: 0.8 MG/DL (ref 0.5–0.9)
ERYTHROCYTE [DISTWIDTH] IN BLOOD BY AUTOMATED COUNT: 12.9 % (ref 11.5–14.5)
GLUCOSE SERPL-MCNC: 88 MG/DL (ref 70–99)
HCT VFR BLD AUTO: 41.4 % (ref 37–47)
HGB BLD-MCNC: 13 G/DL (ref 12–16)
MCH RBC QN AUTO: 28.5 PG (ref 27–31)
MCHC RBC AUTO-ENTMCNC: 31.4 G/DL (ref 33–37)
MCV RBC AUTO: 90.8 FL (ref 81–99)
PLATELET # BLD AUTO: 250 K/UL (ref 130–400)
PMV BLD AUTO: 10.8 FL (ref 9.4–12.3)
POTASSIUM SERPL-SCNC: 4.4 MMOL/L (ref 3.5–5)
PROT SERPL-MCNC: 7.1 G/DL (ref 6.4–8.3)
RBC # BLD AUTO: 4.56 M/UL (ref 4.2–5.4)
SODIUM SERPL-SCNC: 140 MMOL/L (ref 136–145)
TSH SERPL DL<=0.005 MIU/L-ACNC: 1.45 UIU/ML (ref 0.27–4.2)
WBC # BLD AUTO: 6.9 K/UL (ref 4.8–10.8)

## 2024-10-09 PROCEDURE — 1090F PRES/ABSN URINE INCON ASSESS: CPT | Performed by: INTERNAL MEDICINE

## 2024-10-09 PROCEDURE — G8399 PT W/DXA RESULTS DOCUMENT: HCPCS | Performed by: INTERNAL MEDICINE

## 2024-10-09 PROCEDURE — G8484 FLU IMMUNIZE NO ADMIN: HCPCS | Performed by: INTERNAL MEDICINE

## 2024-10-09 PROCEDURE — 1036F TOBACCO NON-USER: CPT | Performed by: INTERNAL MEDICINE

## 2024-10-09 PROCEDURE — 3079F DIAST BP 80-89 MM HG: CPT | Performed by: INTERNAL MEDICINE

## 2024-10-09 PROCEDURE — 99214 OFFICE O/P EST MOD 30 MIN: CPT | Performed by: INTERNAL MEDICINE

## 2024-10-09 PROCEDURE — 1123F ACP DISCUSS/DSCN MKR DOCD: CPT | Performed by: INTERNAL MEDICINE

## 2024-10-09 PROCEDURE — 3074F SYST BP LT 130 MM HG: CPT | Performed by: INTERNAL MEDICINE

## 2024-10-09 PROCEDURE — G8427 DOCREV CUR MEDS BY ELIG CLIN: HCPCS | Performed by: INTERNAL MEDICINE

## 2024-10-09 PROCEDURE — G8417 CALC BMI ABV UP PARAM F/U: HCPCS | Performed by: INTERNAL MEDICINE

## 2024-10-09 RX ORDER — CITALOPRAM HYDROBROMIDE 20 MG/1
10 TABLET ORAL DAILY
Qty: 90 TABLET | Refills: 3 | Status: SHIPPED | OUTPATIENT
Start: 2024-10-09 | End: 2024-10-09 | Stop reason: SDUPTHER

## 2024-10-09 RX ORDER — CITALOPRAM HYDROBROMIDE 20 MG/1
20 TABLET ORAL DAILY
Qty: 90 TABLET | Refills: 3
Start: 2024-10-09

## 2024-10-09 RX ORDER — ERGOCALCIFEROL 1.25 MG/1
50000 CAPSULE ORAL WEEKLY
Qty: 12 CAPSULE | Refills: 3 | Status: SHIPPED | OUTPATIENT
Start: 2024-10-09

## 2024-10-09 NOTE — PROGRESS NOTES
Chief Complaint   Patient presents with    Follow-up     2 month        HPI: Patient is here today to follow-up medical issues including recent DVT pulmonary embolism asthma hypertension hypothyroidism some anxiety this past year she is doing better overall some fatigue still does not feel as well as she would like to.  She still has lots of bilateral knee pain-- pain really more below the knees.  Testing for the [facility] ovarian screening project apparently showed an ovarian cyst.    Past Medical History:   Diagnosis Date    Anxiety     Asthma     GERD (gastroesophageal reflux disease)     Hypertension     Hyperthyroidism     Obesity     Osteoarthritis     Sleep apnea     Vitamin D deficiency        Past Surgical History:   Procedure Laterality Date    BREAST BIOPSY Right 1980    b9    CATARACT REMOVAL      EYE SURGERY      MIKE STEROTACTIC LOC BREAST BIOPSY RIGHT Right 4/30/2024    MIKE STEROTACTIC LOC BREAST BIOPSY RIGHT 4/30/2024 [facility] [place]       Family History   Problem Relation Age of Onset    Heart Disease Mother         Had a pacemaker in her 90s    Cancer Father         bladder    Diabetes Brother     Breast Cancer Maternal Aunt        Social History     Socioeconomic History    Marital status:      Spouse name: Not on file    Number of children: Not on file    Years of education: Not on file    Highest education level: Not on file   Occupational History    Not on file   Tobacco Use    Smoking status: Never    Smokeless tobacco: Never   Substance and Sexual Activity    Alcohol use: Never    Drug use: Never    Sexual activity: Not Currently     Partners: Male   Other Topics Concern    Not on file   Social History Narrative    Not on file     Social Determinants of Health     Financial Resource Strain: Low Risk  (7/11/2024)    Overall Financial Resource Strain (CARDIA)     Difficulty of Paying Living Expenses: Not very hard   Food Insecurity: No Food Insecurity (7/11/2024)    Hunger Vital Sign

## 2024-10-10 ENCOUNTER — HOSPITAL ENCOUNTER (OUTPATIENT)
Dept: GENERAL RADIOLOGY | Facility: HOSPITAL | Age: 79
Discharge: HOME | End: 2024-10-10
Payer: MEDICARE

## 2024-10-10 ENCOUNTER — TRANSCRIBE ORDERS (OUTPATIENT)
Dept: GENERAL RADIOLOGY | Facility: HOSPITAL | Age: 79
End: 2024-10-10
Payer: MEDICARE

## 2024-10-10 DIAGNOSIS — M25.562 LEFT KNEE PAIN, UNSPECIFIED CHRONICITY: Primary | ICD-10-CM

## 2024-10-10 DIAGNOSIS — M25.562 LEFT KNEE PAIN, UNSPECIFIED CHRONICITY: ICD-10-CM

## 2024-10-10 PROCEDURE — 73590 X-RAY EXAM OF LOWER LEG: CPT

## 2024-10-10 PROCEDURE — 73562 X-RAY EXAM OF KNEE 3: CPT

## 2024-10-11 DIAGNOSIS — M17.0 PRIMARY OSTEOARTHRITIS OF BOTH KNEES: Primary | ICD-10-CM

## 2024-10-31 DIAGNOSIS — Z12.31 SCREENING MAMMOGRAM, ENCOUNTER FOR: Primary | ICD-10-CM

## 2024-11-13 DIAGNOSIS — J30.89 NON-SEASONAL ALLERGIC RHINITIS DUE TO OTHER ALLERGIC TRIGGER: ICD-10-CM

## 2024-11-13 RX ORDER — MONTELUKAST SODIUM 10 MG/1
10 TABLET ORAL DAILY
Qty: 30 TABLET | Refills: 3 | Status: SHIPPED | OUTPATIENT
Start: 2024-11-13

## 2024-11-13 RX ORDER — TRIAMTERENE AND HYDROCHLOROTHIAZIDE 37.5; 25 MG/1; MG/1
1 CAPSULE ORAL DAILY
Qty: 90 CAPSULE | Refills: 3 | Status: SHIPPED | OUTPATIENT
Start: 2024-11-13

## 2024-11-21 ENCOUNTER — OFFICE VISIT (OUTPATIENT)
Dept: INTERNAL MEDICINE | Age: 79
End: 2024-11-21

## 2024-11-21 ENCOUNTER — TRANSCRIBE ORDERS (OUTPATIENT)
Dept: ADMINISTRATIVE | Facility: HOSPITAL | Age: 79
End: 2024-11-21
Payer: MEDICARE

## 2024-11-21 VITALS
OXYGEN SATURATION: 97 % | SYSTOLIC BLOOD PRESSURE: 124 MMHG | BODY MASS INDEX: 31.55 KG/M2 | HEART RATE: 72 BPM | DIASTOLIC BLOOD PRESSURE: 62 MMHG | WEIGHT: 201 LBS | HEIGHT: 67 IN

## 2024-11-21 DIAGNOSIS — R10.31 RIGHT LOWER QUADRANT ABDOMINAL PAIN: ICD-10-CM

## 2024-11-21 DIAGNOSIS — I10 PRIMARY HYPERTENSION: ICD-10-CM

## 2024-11-21 DIAGNOSIS — R10.31 ABDOMINAL PAIN, RIGHT LOWER QUADRANT: Primary | ICD-10-CM

## 2024-11-21 DIAGNOSIS — B07.0 PLANTAR WART OF LEFT FOOT: ICD-10-CM

## 2024-11-21 DIAGNOSIS — J45.20 MILD INTERMITTENT ASTHMA WITHOUT COMPLICATION: ICD-10-CM

## 2024-11-21 DIAGNOSIS — I26.99 BILATERAL PULMONARY EMBOLISM (HCC): Primary | ICD-10-CM

## 2024-11-21 RX ORDER — ATENOLOL 50 MG/1
50 TABLET ORAL DAILY
Qty: 90 TABLET | Refills: 3 | Status: SHIPPED | OUTPATIENT
Start: 2024-11-21 | End: 2025-11-16

## 2024-11-21 NOTE — PROGRESS NOTES
MCG/ACT inhaler Inhale 2 puffs into the lungs 4 times daily as needed for Wheezing 18 g 0    RESTASIS 0.05 % ophthalmic emulsion        No current facility-administered medications for this visit.       Review of Systems    /62   Pulse 72   Ht 1.702 m (5' 7\")   Wt 91.2 kg (201 lb)   SpO2 97%   BMI 31.48 kg/m²   BP Readings from Last 7 Encounters:   12/04/24 110/72   11/21/24 124/62   10/09/24 120/82   07/15/24 (!) 110/54   07/11/24 128/74   06/11/24 122/68   05/16/24 122/76     Wt Readings from Last 7 Encounters:   12/04/24 93 kg (205 lb)   11/21/24 91.2 kg (201 lb)   10/09/24 91.5 kg (201 lb 12.8 oz)   07/15/24 89.4 kg (197 lb)   07/11/24 89.4 kg (197 lb)   06/11/24 89.4 kg (197 lb)   05/16/24 92.5 kg (204 lb)     BMI Readings from Last 7 Encounters:   12/04/24 32.11 kg/m²   11/21/24 31.48 kg/m²   10/09/24 31.61 kg/m²   07/15/24 30.85 kg/m²   07/11/24 30.85 kg/m²   06/11/24 30.85 kg/m²   05/16/24 31.95 kg/m²     Resp Readings from Last 7 Encounters:   09/06/23 20       Physical Exam  Constitutional:       General: She is not in acute distress.  Eyes:      General: No scleral icterus.  Cardiovascular:      Heart sounds: Normal heart sounds.   Pulmonary:      Breath sounds: Normal breath sounds.   Musculoskeletal:      Cervical back: Neck supple.   Lymphadenopathy:      Cervical: No cervical adenopathy.   Skin:     Findings: No rash.   Psychiatric:         Mood and Affect: Mood normal.         Results for orders placed or performed in visit on 10/09/24   Vitamin D 25 Hydroxy   Result Value Ref Range    Vit D, 25-Hydroxy 57.7 >=30 ng/mL      Result Value Ref Range     6 0 - 38 U/mL   TSH   Result Value Ref Range    TSH 1.450 0.270 - 4.200 uIU/mL   Comprehensive Metabolic Panel   Result Value Ref Range    Sodium 140 136 - 145 mmol/L    Potassium 4.4 3.5 - 5.0 mmol/L    Chloride 103 98 - 111 mmol/L    CO2 28 22 - 29 mmol/L    Anion Gap 9 7 - 19 mmol/L    Glucose 88 70 - 99 mg/dL    BUN 19 8 - 23

## 2024-11-26 PROBLEM — I34.1 MITRAL VALVE PROLAPSE: Status: RESOLVED | Noted: 2020-05-18 | Resolved: 2024-11-26

## 2024-11-26 PROBLEM — K64.9 ACUTE HEMORRHOID: Status: RESOLVED | Noted: 2023-12-27 | Resolved: 2024-11-26

## 2024-12-04 ENCOUNTER — OFFICE VISIT (OUTPATIENT)
Dept: INTERNAL MEDICINE | Age: 79
End: 2024-12-04
Payer: MEDICARE

## 2024-12-04 VITALS
WEIGHT: 205 LBS | TEMPERATURE: 97.4 F | HEIGHT: 67 IN | HEART RATE: 71 BPM | OXYGEN SATURATION: 98 % | DIASTOLIC BLOOD PRESSURE: 72 MMHG | BODY MASS INDEX: 32.18 KG/M2 | SYSTOLIC BLOOD PRESSURE: 110 MMHG

## 2024-12-04 DIAGNOSIS — J01.00 ACUTE NON-RECURRENT MAXILLARY SINUSITIS: Primary | ICD-10-CM

## 2024-12-04 DIAGNOSIS — R05.1 ACUTE COUGH: ICD-10-CM

## 2024-12-04 PROCEDURE — G8427 DOCREV CUR MEDS BY ELIG CLIN: HCPCS | Performed by: NURSE PRACTITIONER

## 2024-12-04 PROCEDURE — 3074F SYST BP LT 130 MM HG: CPT | Performed by: NURSE PRACTITIONER

## 2024-12-04 PROCEDURE — 1090F PRES/ABSN URINE INCON ASSESS: CPT | Performed by: NURSE PRACTITIONER

## 2024-12-04 PROCEDURE — 3078F DIAST BP <80 MM HG: CPT | Performed by: NURSE PRACTITIONER

## 2024-12-04 PROCEDURE — G8399 PT W/DXA RESULTS DOCUMENT: HCPCS | Performed by: NURSE PRACTITIONER

## 2024-12-04 PROCEDURE — 1036F TOBACCO NON-USER: CPT | Performed by: NURSE PRACTITIONER

## 2024-12-04 PROCEDURE — G8482 FLU IMMUNIZE ORDER/ADMIN: HCPCS | Performed by: NURSE PRACTITIONER

## 2024-12-04 PROCEDURE — 99213 OFFICE O/P EST LOW 20 MIN: CPT | Performed by: NURSE PRACTITIONER

## 2024-12-04 PROCEDURE — 1159F MED LIST DOCD IN RCRD: CPT | Performed by: NURSE PRACTITIONER

## 2024-12-04 PROCEDURE — G8417 CALC BMI ABV UP PARAM F/U: HCPCS | Performed by: NURSE PRACTITIONER

## 2024-12-04 PROCEDURE — 1123F ACP DISCUSS/DSCN MKR DOCD: CPT | Performed by: NURSE PRACTITIONER

## 2024-12-04 RX ORDER — AZITHROMYCIN 250 MG/1
TABLET, FILM COATED ORAL
Qty: 6 TABLET | Refills: 0 | Status: SHIPPED | OUTPATIENT
Start: 2024-12-04 | End: 2024-12-14

## 2024-12-04 RX ORDER — BENZONATATE 100 MG/1
100 CAPSULE ORAL 3 TIMES DAILY PRN
Qty: 21 CAPSULE | Refills: 0 | Status: SHIPPED | OUTPATIENT
Start: 2024-12-04 | End: 2024-12-11

## 2024-12-04 ASSESSMENT — ENCOUNTER SYMPTOMS
EYES NEGATIVE: 1
SINUS PRESSURE: 1
COUGH: 1
ALLERGIC/IMMUNOLOGIC NEGATIVE: 1
GASTROINTESTINAL NEGATIVE: 1
RHINORRHEA: 1

## 2024-12-04 NOTE — PROGRESS NOTES
[facility]  [facility] INTERNAL MEDICINE  [address] [place] [address] [address]IVE  [address]  [place] [address]  Dept: [phone]  Dept Fax: [phone]  Loc: [phone]    Mel Damon is a 79 y.o. female who presents today for her medical conditions/complaints as noted below.  Mel Damon is c/o of Congestion (Sinus congestion with symptoms of a cold, sinus infection or allergies. Patient is wanting medicine. Patient is sure its a sinus infection because of facial and under eye pain. )        HPI:   She is a patient of Dr. America Narvaez that presents for an acute care visit today.  She complains of sinus congestion, runny nose and cough and a possible sinus infection that started yesterday.  She reports facial pain under her eyes and thinks she may Zpack medicine because this normally turns into bronchitis and takes longer to get over this.   She has been taking Tylenol.    Denies any fever.  Reports awakening sweating the last two nights.  HPI   Chief Complaint   Patient presents with    Congestion     Sinus congestion with symptoms of a cold, sinus infection or allergies. Patient is wanting medicine. Patient is sure its a sinus infection because of facial and under eye pain.      Past Medical History:   Diagnosis Date    Anxiety     Asthma     GERD (gastroesophageal reflux disease)     Hypertension     Hyperthyroidism     Obesity     Osteoarthritis     Sleep apnea     Vitamin D deficiency       Past Surgical History:   Procedure Laterality Date    BREAST BIOPSY Right 1980    b9    CATARACT REMOVAL      EYE SURGERY      MIKE STEROTACTIC LOC BREAST BIOPSY RIGHT Right 4/30/2024    MIKE STEROTACTIC LOC BREAST BIOPSY RIGHT 4/30/2024 [facility] [place]           12/4/2024    12:39 PM 11/21/2024     9:01 AM 10/9/2024     1:06 PM 7/15/2024     1:52 PM 7/11/2024     6:08 PM 7/11/2024     4:06 PM   Vitals   SYSTOLIC 110 124 120 110  128   DIASTOLIC 72 62 82 54  74   Pulse 71 72 67 76  95   Temp 97.4 °F (36.3 °C)

## 2024-12-12 PROBLEM — R05.3 CHRONIC COUGH: Status: RESOLVED | Noted: 2023-07-11 | Resolved: 2024-12-12

## 2024-12-18 ENCOUNTER — HOSPITAL ENCOUNTER (OUTPATIENT)
Dept: CT IMAGING | Facility: HOSPITAL | Age: 79
Discharge: HOME | End: 2024-12-18
Admitting: INTERNAL MEDICINE
Payer: MEDICARE

## 2024-12-18 DIAGNOSIS — R10.31 ABDOMINAL PAIN, RIGHT LOWER QUADRANT: ICD-10-CM

## 2024-12-18 PROCEDURE — 25510000001 IOPAMIDOL 61 % SOLUTION: Performed by: INTERNAL MEDICINE

## 2024-12-18 PROCEDURE — 74177 CT ABD & PELVIS W/CONTRAST: CPT

## 2024-12-18 RX ORDER — IOPAMIDOL 612 MG/ML
100 INJECTION, SOLUTION INTRAVASCULAR
Status: COMPLETED | OUTPATIENT
Start: 2024-12-18 | End: 2024-12-18

## 2024-12-18 RX ADMIN — IOPAMIDOL 100 ML: 612 INJECTION, SOLUTION INTRAVENOUS at 11:10

## 2024-12-20 ENCOUNTER — TELEPHONE (OUTPATIENT)
Dept: INTERNAL MEDICINE | Age: 79
End: 2024-12-20

## 2024-12-20 DIAGNOSIS — N83.201 RIGHT OVARIAN CYST: ICD-10-CM

## 2024-12-20 DIAGNOSIS — N83.201 BILATERAL OVARIAN CYSTS: Primary | ICD-10-CM

## 2024-12-20 DIAGNOSIS — N83.202 BILATERAL OVARIAN CYSTS: Primary | ICD-10-CM

## 2024-12-20 NOTE — TELEPHONE ENCOUNTER
----- Message from Dr. America Smith MD sent at 12/20/2024  7:41 AM CST -----  See if she is still having much pain. I think the cysts are followed by [facility] ovarian cancer screening but I think it would be good to see a gynecologist here also. Let me know if she has a preference - if not I will refer to one of the ones at [facility] and [facility] get a transvaginal ultrasound to better look at ovaries.

## 2024-12-23 NOTE — TELEPHONE ENCOUNTER
Actually tell her when I was placing orders I went ahead and put for an ultrasound at [facility] as I found one there in 2014 and I see where she saw Dr. Garza before-- before we refer to gynecology let's get a transvaginal ultrasound at [facility] as that can best compare if this has changed and then we can talk and go from there

## 2024-12-26 ENCOUNTER — HOSPITAL ENCOUNTER (OUTPATIENT)
Dept: ULTRASOUND IMAGING | Age: 79
Discharge: HOME | End: 2024-12-26
Payer: MEDICARE

## 2024-12-26 DIAGNOSIS — N83.201 RIGHT OVARIAN CYST: ICD-10-CM

## 2024-12-26 PROCEDURE — 76830 TRANSVAGINAL US NON-OB: CPT

## 2025-01-02 ENCOUNTER — TELEPHONE (OUTPATIENT)
Dept: INTERNAL MEDICINE | Age: 80
End: 2025-01-02

## 2025-01-02 ENCOUNTER — TRANSCRIBE ORDERS (OUTPATIENT)
Dept: ADMINISTRATIVE | Facility: HOSPITAL | Age: 80
End: 2025-01-02
Payer: MEDICARE

## 2025-01-02 DIAGNOSIS — I82.90 VENOUS THROMBOSIS: ICD-10-CM

## 2025-01-02 DIAGNOSIS — I26.99 PULMONARY EMBOLISM, BILATERAL: Primary | ICD-10-CM

## 2025-01-02 DIAGNOSIS — I26.99 BILATERAL PULMONARY EMBOLISM (HCC): Primary | ICD-10-CM

## 2025-01-02 NOTE — TELEPHONE ENCOUNTER
She has an appt January 9th.  She wants to know if she needs another venous scan done prior to seeing Dr. Narvaez.  Her last one was in July and she was supposed to repeat it in January.

## 2025-01-08 ASSESSMENT — PATIENT HEALTH QUESTIONNAIRE - PHQ9
4. FEELING TIRED OR HAVING LITTLE ENERGY: NOT AT ALL
SUM OF ALL RESPONSES TO PHQ QUESTIONS 1-9: 1
SUM OF ALL RESPONSES TO PHQ QUESTIONS 1-9: 1
8. MOVING OR SPEAKING SO SLOWLY THAT OTHER PEOPLE COULD HAVE NOTICED. OR THE OPPOSITE, BEING SO FIGETY OR RESTLESS THAT YOU HAVE BEEN MOVING AROUND A LOT MORE THAN USUAL: NOT AT ALL
SUM OF ALL RESPONSES TO PHQ QUESTIONS 1-9: 1
6. FEELING BAD ABOUT YOURSELF - OR THAT YOU ARE A FAILURE OR HAVE LET YOURSELF OR YOUR FAMILY DOWN: NOT AT ALL
10. IF YOU CHECKED OFF ANY PROBLEMS, HOW DIFFICULT HAVE THESE PROBLEMS MADE IT FOR YOU TO DO YOUR WORK, TAKE CARE OF THINGS AT HOME, OR GET ALONG WITH OTHER PEOPLE: NOT DIFFICULT AT ALL
SUM OF ALL RESPONSES TO PHQ9 QUESTIONS 1 & 2: 0
10. IF YOU CHECKED OFF ANY PROBLEMS, HOW DIFFICULT HAVE THESE PROBLEMS MADE IT FOR YOU TO DO YOUR WORK, TAKE CARE OF THINGS AT HOME, OR GET ALONG WITH OTHER PEOPLE: NOT DIFFICULT AT ALL
4. FEELING TIRED OR HAVING LITTLE ENERGY: NOT AT ALL
5. POOR APPETITE OR OVEREATING: SEVERAL DAYS
SUM OF ALL RESPONSES TO PHQ QUESTIONS 1-9: 1
8. MOVING OR SPEAKING SO SLOWLY THAT OTHER PEOPLE COULD HAVE NOTICED. OR THE OPPOSITE - BEING SO FIDGETY OR RESTLESS THAT YOU HAVE BEEN MOVING AROUND A LOT MORE THAN USUAL: NOT AT ALL
1. LITTLE INTEREST OR PLEASURE IN DOING THINGS: NOT AT ALL
2. FEELING DOWN, DEPRESSED OR HOPELESS: NOT AT ALL
3. TROUBLE FALLING OR STAYING ASLEEP: NOT AT ALL
7. TROUBLE CONCENTRATING ON THINGS, SUCH AS READING THE NEWSPAPER OR WATCHING TELEVISION: NOT AT ALL
6. FEELING BAD ABOUT YOURSELF - OR THAT YOU ARE A FAILURE OR HAVE LET YOURSELF OR YOUR FAMILY DOWN: NOT AT ALL
7. TROUBLE CONCENTRATING ON THINGS, SUCH AS READING THE NEWSPAPER OR WATCHING TELEVISION: NOT AT ALL
9. THOUGHTS THAT YOU WOULD BE BETTER OFF DEAD, OR OF HURTING YOURSELF: NOT AT ALL
9. THOUGHTS THAT YOU WOULD BE BETTER OFF DEAD, OR OF HURTING YOURSELF: NOT AT ALL
1. LITTLE INTEREST OR PLEASURE IN DOING THINGS: NOT AT ALL
SUM OF ALL RESPONSES TO PHQ QUESTIONS 1-9: 1
3. TROUBLE FALLING OR STAYING ASLEEP: NOT AT ALL
2. FEELING DOWN, DEPRESSED OR HOPELESS: NOT AT ALL

## 2025-01-09 ENCOUNTER — OFFICE VISIT (OUTPATIENT)
Dept: INTERNAL MEDICINE | Age: 80
End: 2025-01-09

## 2025-01-09 VITALS
SYSTOLIC BLOOD PRESSURE: 122 MMHG | WEIGHT: 209 LBS | OXYGEN SATURATION: 95 % | BODY MASS INDEX: 32.8 KG/M2 | HEIGHT: 67 IN | DIASTOLIC BLOOD PRESSURE: 72 MMHG | HEART RATE: 70 BPM

## 2025-01-09 DIAGNOSIS — J30.89 NON-SEASONAL ALLERGIC RHINITIS DUE TO OTHER ALLERGIC TRIGGER: ICD-10-CM

## 2025-01-09 DIAGNOSIS — I10 PRIMARY HYPERTENSION: ICD-10-CM

## 2025-01-09 DIAGNOSIS — I26.99 BILATERAL PULMONARY EMBOLISM (HCC): Primary | ICD-10-CM

## 2025-01-09 DIAGNOSIS — E55.9 VITAMIN D DEFICIENCY: ICD-10-CM

## 2025-01-09 DIAGNOSIS — R92.8 ABNORMAL MAMMOGRAM OF RIGHT BREAST: ICD-10-CM

## 2025-01-09 DIAGNOSIS — J45.20 MILD INTERMITTENT ASTHMA WITHOUT COMPLICATION: ICD-10-CM

## 2025-01-09 DIAGNOSIS — N83.201 RIGHT OVARIAN CYST: ICD-10-CM

## 2025-01-09 RX ORDER — MONTELUKAST SODIUM 10 MG/1
10 TABLET ORAL DAILY
Qty: 90 TABLET | Refills: 3 | Status: SHIPPED | OUTPATIENT
Start: 2025-01-09

## 2025-01-09 SDOH — ECONOMIC STABILITY: FOOD INSECURITY: WITHIN THE PAST 12 MONTHS, YOU WORRIED THAT YOUR FOOD WOULD RUN OUT BEFORE YOU GOT MONEY TO BUY MORE.: NEVER TRUE

## 2025-01-09 SDOH — ECONOMIC STABILITY: FOOD INSECURITY: WITHIN THE PAST 12 MONTHS, THE FOOD YOU BOUGHT JUST DIDN'T LAST AND YOU DIDN'T HAVE MONEY TO GET MORE.: NEVER TRUE

## 2025-01-09 NOTE — PROGRESS NOTES
Chief Complaint   Patient presents with    3 Month Follow-Up     Hx of bilateral pulmonary embolism       HPI: Patient is here today to follow-up history of bilateral PE and DVT some abdominal pain right ovarian cyst mild intermittent asthma hypertension other medical issues overall she is doing okay.  Did have some recent loose stool.  She also notes some hoarseness in the afternoons.  Checking on blood clot - ovarian cyst - loose stools-- loses voice late in the afternoon    Past Medical History:   Diagnosis Date    Anxiety     Asthma     GERD (gastroesophageal reflux disease)     Hypertension     Hyperthyroidism     Obesity     Osteoarthritis     Sleep apnea     Vitamin D deficiency        Past Surgical History:   Procedure Laterality Date    BREAST BIOPSY Right 1980    b9    CATARACT REMOVAL      EYE SURGERY      MIKE STEREO BREAST BX W LOC DEVICE 1ST LESION RIGHT Right 4/30/2024    MIKE STEROTACTIC LOC BREAST BIOPSY RIGHT 4/30/2024 [facility] [place]       Family History   Problem Relation Age of Onset    Heart Disease Mother         Had a pacemaker in her 90s    Cancer Father         bladder    Diabetes Brother     Breast Cancer Maternal Aunt        Social History     Socioeconomic History    Marital status:      Spouse name: Not on file    Number of children: Not on file    Years of education: Not on file    Highest education level: Not on file   Occupational History    Not on file   Tobacco Use    Smoking status: Never    Smokeless tobacco: Never   Substance and Sexual Activity    Alcohol use: Never    Drug use: Never    Sexual activity: Not Currently     Partners: Male   Other Topics Concern    Not on file   Social History Narrative    Not on file     Social Determinants of Health     Financial Resource Strain: Low Risk  (7/11/2024)    Overall Financial Resource Strain (CARDIA)     Difficulty of Paying Living Expenses: Not very hard   Food Insecurity: No Food Insecurity (1/9/2025)    Hunger Vital Sign

## 2025-01-16 ENCOUNTER — HOSPITAL ENCOUNTER (OUTPATIENT)
Dept: ULTRASOUND IMAGING | Facility: HOSPITAL | Age: 80
Discharge: HOME | End: 2025-01-16
Admitting: INTERNAL MEDICINE
Payer: MEDICARE

## 2025-01-16 DIAGNOSIS — I26.99 PULMONARY EMBOLISM, BILATERAL: ICD-10-CM

## 2025-01-16 DIAGNOSIS — I82.90 VENOUS THROMBOSIS: ICD-10-CM

## 2025-01-16 PROCEDURE — 93970 EXTREMITY STUDY: CPT

## 2025-01-20 ENCOUNTER — TELEPHONE (OUTPATIENT)
Dept: INTERNAL MEDICINE | Age: 80
End: 2025-01-20

## 2025-01-20 NOTE — TELEPHONE ENCOUNTER
She sees the eye surgeon tomorrow and is asking for the venous scan results done at [facility].  Result in Care Everywhere.

## 2025-01-20 NOTE — TELEPHONE ENCOUNTER
The venous scan is normal - no DVT seen.   I am ok with stopping her eliquis when the current bottle runs out and can be off of it for surgery.  Ask her if she has ever had any other blood clot or if this is the only one and how long until her eliquis runs out and let me know.  Thanks.

## 2025-01-20 NOTE — TELEPHONE ENCOUNTER
IV discontinued, catheter intact, and dressing applied. Procedural dressing dry and intact. Bilateral lower extremities equal in strength. Discharge instructions reviewed with patient, signed and copy given. All home medications have been reviewed. All questions answered and patient verbalized understanding.   PAIN LEVEL AT DISCHARGE __4___ She had recent blood clots in both lungs.  Had a TIA in the \"80s.    Has about 10 days left of eliquis.   Asking if she needs a cxr?

## 2025-01-20 NOTE — TELEPHONE ENCOUNTER
She called back and she has 54 days worth left of Eliquis.   See other response about the blood clots in her lungs.

## 2025-01-26 PROBLEM — N83.201 RIGHT OVARIAN CYST: Status: ACTIVE | Noted: 2025-01-26

## 2025-01-29 ENCOUNTER — HOSPITAL ENCOUNTER (OUTPATIENT)
Dept: WOMENS IMAGING | Age: 80
Discharge: HOME | End: 2025-01-29
Attending: INTERNAL MEDICINE
Payer: MEDICARE

## 2025-01-29 VITALS — BODY MASS INDEX: 30.54 KG/M2 | WEIGHT: 195 LBS

## 2025-01-29 DIAGNOSIS — R92.8 ABNORMAL MAMMOGRAM OF RIGHT BREAST: ICD-10-CM

## 2025-01-29 PROCEDURE — G0279 TOMOSYNTHESIS, MAMMO: HCPCS

## 2025-01-31 ENCOUNTER — OFFICE VISIT (OUTPATIENT)
Dept: INTERNAL MEDICINE | Age: 80
End: 2025-01-31

## 2025-01-31 VITALS
BODY MASS INDEX: 30.61 KG/M2 | HEART RATE: 70 BPM | SYSTOLIC BLOOD PRESSURE: 120 MMHG | HEIGHT: 67 IN | WEIGHT: 195 LBS | DIASTOLIC BLOOD PRESSURE: 74 MMHG | OXYGEN SATURATION: 97 %

## 2025-01-31 DIAGNOSIS — N81.6 RECTOCELE: ICD-10-CM

## 2025-01-31 DIAGNOSIS — I10 PRIMARY HYPERTENSION: Primary | ICD-10-CM

## 2025-01-31 DIAGNOSIS — R39.198 SLOW URINARY STREAM: ICD-10-CM

## 2025-01-31 DIAGNOSIS — N83.201 RIGHT OVARIAN CYST: ICD-10-CM

## 2025-01-31 DIAGNOSIS — H02.403 PTOSIS OF BOTH EYELIDS: ICD-10-CM

## 2025-01-31 DIAGNOSIS — N81.4 UTERINE PROLAPSE: ICD-10-CM

## 2025-01-31 DIAGNOSIS — I26.99 BILATERAL PULMONARY EMBOLISM (HCC): ICD-10-CM

## 2025-01-31 DIAGNOSIS — H69.91 EUSTACHIAN TUBE DYSFUNCTION, RIGHT: ICD-10-CM

## 2025-01-31 DIAGNOSIS — N81.11 MIDLINE CYSTOCELE: ICD-10-CM

## 2025-01-31 RX ORDER — FLUTICASONE PROPIONATE 50 MCG
2 SPRAY, SUSPENSION (ML) NASAL DAILY
Qty: 48 G | Refills: 1 | Status: SHIPPED | OUTPATIENT
Start: 2025-01-31

## 2025-01-31 NOTE — PROGRESS NOTES
Chief Complaint   Patient presents with    right ear fullness       HPI:   History of Present Illness  The patient presents for f/u htn and hx PE-- also   evaluation of left eye droop, ear pain, weak urine stream, and pulmonary embolism.    She is scheduled for a revision eye surgery on her left eye on 02/17/2025, under the care of an opthalmologist in [place]. The initial surgery on her right eye was successful, but the left eye still exhibits a slight droop, which does not impact her vision.     We spoke about hx PE- really seems unproved.   She expresses concern about discontinuing Eliquis due to a previous incident where she developed a blood clot after prolonged sitting with her legs hanging while working daily. She has approximately 50 days' worth of Eliquis remaining. She reports no adverse effects from Eliquis and notes that her insurance covers most of the cost.    She underwent a mammogram this week, which yielded normal results. Her routine mammogram is typically conducted in April. A chip was inserted during a previous biopsy.    She has been experiencing ear congestion for approximately 2 weeks, accompanied by occasional sharp, transient pain. She also reports a sensation of hearing her heartbeat in her ear when lying down, which disrupts her sleep. She recalls a past ear infection that caused similar symptoms. Additionally, she reports frequent nose blowing and sneezing. She has previously used Flonase and currently has Zyrtec at home. She also mentions occasional voice loss in the evenings.    She reports a gradual change in her urinary habits, characterized by a weak urine stream and decreased frequency of urination. She does not experience any burning sensation during urination. She has a history of uterine and bowel prolapse.    FAMILY HISTORY  Her mother had a blood clot twice, with the most recent one being a pulmonary embolism while she was in the hospital.    MEDICATIONS  Eliquis, Flonase,

## 2025-02-02 PROBLEM — H02.403 PTOSIS OF BOTH EYELIDS: Status: ACTIVE | Noted: 2025-02-02

## 2025-02-04 ENCOUNTER — TELEPHONE (OUTPATIENT)
Dept: CARDIOLOGY | Facility: CLINIC | Age: 80
End: 2025-02-04
Payer: MEDICARE

## 2025-02-04 NOTE — TELEPHONE ENCOUNTER
----- Message from Edwar Gonzalez sent at 2/4/2025 10:09 AM CST -----  Yes, please use the preoperative assessment from her last office visit.  ----- Message -----  From: Kenyatta Del Cid MA  Sent: 2/3/2025  11:20 AM CST  To: Edwar Gonzalez MD    CARDIAC RISK ASSESSMENT FROM DR GONG. Do you want me to send the pre-op you did on 4/24/24?

## 2025-03-03 ENCOUNTER — TELEPHONE (OUTPATIENT)
Dept: INTERNAL MEDICINE | Age: 80
End: 2025-03-03

## 2025-03-03 DIAGNOSIS — F32.9 REACTIVE DEPRESSION: ICD-10-CM

## 2025-03-03 RX ORDER — CITALOPRAM HYDROBROMIDE 20 MG/1
20 TABLET ORAL DAILY
Qty: 90 TABLET | Refills: 3 | Status: SHIPPED | OUTPATIENT
Start: 2025-03-03

## 2025-03-06 ENCOUNTER — OFFICE VISIT (OUTPATIENT)
Age: 80
End: 2025-03-06

## 2025-03-06 VITALS — BODY MASS INDEX: 29.03 KG/M2 | HEIGHT: 67 IN | WEIGHT: 185 LBS

## 2025-03-06 DIAGNOSIS — M25.562 ACUTE PAIN OF LEFT KNEE: ICD-10-CM

## 2025-03-06 DIAGNOSIS — M17.0 PRIMARY OSTEOARTHRITIS OF BOTH KNEES: ICD-10-CM

## 2025-03-06 DIAGNOSIS — M25.561 ACUTE PAIN OF RIGHT KNEE: Primary | ICD-10-CM

## 2025-03-06 RX ORDER — LIDOCAINE HYDROCHLORIDE 10 MG/ML
2.5 INJECTION, SOLUTION INFILTRATION; PERINEURAL ONCE
Status: COMPLETED | OUTPATIENT
Start: 2025-03-06 | End: 2025-03-06

## 2025-03-06 RX ORDER — BETAMETHASONE SODIUM PHOSPHATE AND BETAMETHASONE ACETATE 3; 3 MG/ML; MG/ML
12 INJECTION, SUSPENSION INTRA-ARTICULAR; INTRALESIONAL; INTRAMUSCULAR; SOFT TISSUE ONCE
Status: COMPLETED | OUTPATIENT
Start: 2025-03-06 | End: 2025-03-06

## 2025-03-06 RX ORDER — BUPIVACAINE HYDROCHLORIDE 5 MG/ML
5 INJECTION, SOLUTION PERINEURAL ONCE
Status: COMPLETED | OUTPATIENT
Start: 2025-03-06 | End: 2025-03-06

## 2025-03-06 RX ADMIN — LIDOCAINE HYDROCHLORIDE 2.5 ML: 10 INJECTION, SOLUTION INFILTRATION; PERINEURAL at 15:24

## 2025-03-06 RX ADMIN — LIDOCAINE HYDROCHLORIDE 2.5 ML: 10 INJECTION, SOLUTION INFILTRATION; PERINEURAL at 15:21

## 2025-03-06 RX ADMIN — BETAMETHASONE SODIUM PHOSPHATE AND BETAMETHASONE ACETATE 12 MG: 3; 3 INJECTION, SUSPENSION INTRA-ARTICULAR; INTRALESIONAL; INTRAMUSCULAR; SOFT TISSUE at 15:06

## 2025-03-06 RX ADMIN — BUPIVACAINE HYDROCHLORIDE 25 MG: 5 INJECTION, SOLUTION PERINEURAL at 15:07

## 2025-03-06 RX ADMIN — BUPIVACAINE HYDROCHLORIDE 25 MG: 5 INJECTION, SOLUTION PERINEURAL at 15:08

## 2025-03-06 NOTE — PROGRESS NOTES
[facility]  [facility]  [address]   [facility] [id number] [phone]     Patient: Mel Damon   YOB: 1945   Date: 3/6/2025     Chief Complaint   Patient presents with    Follow-up     BL Knee Pain        History of Present Illness  This is a 79 y.o. female presents today bilateral knee pain.  I have previously seen her way in the past but will try to start between pes anserine bursitis and intra-articular pain from her knees.  She has been getting rheumatology injections into the bursa and that has been helping her.  She arrives today for reevaluation..    History of Present Illness  The patient presents for evaluation of bilateral knee pain.    She reports experiencing pain in both knees, with the intensity varying between the two at different times. Despite the discomfort, she maintains full range of motion in her knees. She is not diabetic. She has a history of receiving injections from her rheumatologist, both in the bursa and the knees, but these interventions have not provided significant relief. She is scheduled for another bursa injection next month.       Past Medical History:   Diagnosis Date    Anxiety     Asthma     GERD (gastroesophageal reflux disease)     Hypertension     Hyperthyroidism     Obesity     Osteoarthritis     Sleep apnea     Vitamin D deficiency       Past Surgical History:   Procedure Laterality Date    BREAST BIOPSY Right 1980    b9    CATARACT REMOVAL      EYE SURGERY      MIKE STEREO BREAST BX W LOC DEVICE 1ST LESION RIGHT Right 4/30/2024    MIKE STEROTACTIC LOC BREAST BIOPSY RIGHT 4/30/2024 [facility] [place]      Social History     Socioeconomic History    Marital status:    Tobacco Use    Smoking status: Never    Smokeless tobacco: Never   Substance and Sexual Activity    Alcohol use: Never    Drug use: Never    Sexual activity: Not Currently     Partners: Male     Social

## 2025-03-07 ENCOUNTER — TELEPHONE (OUTPATIENT)
Dept: INTERNAL MEDICINE | Age: 80
End: 2025-03-07

## 2025-03-07 DIAGNOSIS — I26.99 BILATERAL PULMONARY EMBOLISM (HCC): ICD-10-CM

## 2025-03-07 NOTE — TELEPHONE ENCOUNTER
Patient calling 3/7/25 requesting a refill of their apixaban (ELIQUIS) 5 MG TABS tablet  medication. Patient would like it sent to   [facility] - [place], [place] - [address] [place] [address] - P [phone] - F [phone]  [address] [place] [address]  Phone: [phone]  Fax: [phone]       Patient was wanting 3 month supply

## 2025-03-21 DIAGNOSIS — R39.198 SLOW URINARY STREAM: ICD-10-CM

## 2025-03-21 DIAGNOSIS — I10 PRIMARY HYPERTENSION: ICD-10-CM

## 2025-03-21 DIAGNOSIS — E55.9 VITAMIN D DEFICIENCY: ICD-10-CM

## 2025-03-21 LAB
25(OH)D3 SERPL-MCNC: 33.5 NG/ML
ALBUMIN SERPL-MCNC: 3.9 G/DL (ref 3.5–5.2)
ALP SERPL-CCNC: 83 U/L (ref 35–104)
ALT SERPL-CCNC: 22 U/L (ref 10–35)
ANION GAP SERPL CALCULATED.3IONS-SCNC: 6 MMOL/L (ref 8–16)
AST SERPL-CCNC: 18 U/L (ref 10–35)
BACTERIA URNS QL MICRO: NEGATIVE /HPF
BILIRUB SERPL-MCNC: 0.4 MG/DL (ref 0.2–1.2)
BILIRUB UR QL STRIP: NEGATIVE
BUN SERPL-MCNC: 17 MG/DL (ref 8–23)
CALCIUM SERPL-MCNC: 8.8 MG/DL (ref 8.8–10.2)
CHLORIDE SERPL-SCNC: 106 MMOL/L (ref 98–107)
CHOLEST SERPL-MCNC: 162 MG/DL (ref 0–199)
CLARITY UR: CLEAR
CO2 SERPL-SCNC: 30 MMOL/L (ref 22–29)
COLOR UR: YELLOW
CREAT SERPL-MCNC: 0.7 MG/DL (ref 0.5–0.9)
CRYSTALS URNS MICRO: ABNORMAL /HPF
EPI CELLS #/AREA URNS AUTO: 4 /HPF (ref 0–5)
ERYTHROCYTE [DISTWIDTH] IN BLOOD BY AUTOMATED COUNT: 13 % (ref 11.5–14.5)
GLUCOSE SERPL-MCNC: 93 MG/DL (ref 70–99)
GLUCOSE UR STRIP.AUTO-MCNC: NEGATIVE MG/DL
HCT VFR BLD AUTO: 40 % (ref 37–47)
HDLC SERPL-MCNC: 55 MG/DL (ref 40–60)
HGB BLD-MCNC: 12.8 G/DL (ref 12–16)
HGB UR STRIP.AUTO-MCNC: NEGATIVE MG/L
HYALINE CASTS #/AREA URNS AUTO: 6 /HPF (ref 0–8)
KETONES UR STRIP.AUTO-MCNC: NEGATIVE MG/DL
LDLC SERPL CALC-MCNC: 86 MG/DL
LEUKOCYTE ESTERASE UR QL STRIP.AUTO: ABNORMAL
MCH RBC QN AUTO: 28.8 PG (ref 27–31)
MCHC RBC AUTO-ENTMCNC: 32 G/DL (ref 33–37)
MCV RBC AUTO: 90.1 FL (ref 81–99)
NITRITE UR QL STRIP.AUTO: NEGATIVE
PH UR STRIP.AUTO: 5.5 [PH] (ref 5–8)
PLATELET # BLD AUTO: 231 K/UL (ref 130–400)
PMV BLD AUTO: 10.4 FL (ref 9.4–12.3)
POTASSIUM SERPL-SCNC: 4.1 MMOL/L (ref 3.5–5.1)
PROT SERPL-MCNC: 6.2 G/DL (ref 6.4–8.3)
PROT UR STRIP.AUTO-MCNC: NEGATIVE MG/DL
RBC # BLD AUTO: 4.44 M/UL (ref 4.2–5.4)
RBC #/AREA URNS AUTO: 2 /HPF (ref 0–4)
SODIUM SERPL-SCNC: 142 MMOL/L (ref 136–145)
SP GR UR STRIP.AUTO: 1.02 (ref 1–1.03)
TRIGL SERPL-MCNC: 107 MG/DL (ref 0–149)
TSH SERPL DL<=0.005 MIU/L-ACNC: 2.37 UIU/ML (ref 0.27–4.2)
UROBILINOGEN UR STRIP.AUTO-MCNC: 0.2 E.U./DL
WBC # BLD AUTO: 7.9 K/UL (ref 4.8–10.8)
WBC #/AREA URNS AUTO: 16 /HPF (ref 0–5)

## 2025-03-23 LAB — BACTERIA UR CULT: NORMAL

## 2025-03-25 SDOH — HEALTH STABILITY: PHYSICAL HEALTH: ON AVERAGE, HOW MANY MINUTES DO YOU ENGAGE IN EXERCISE AT THIS LEVEL?: 20 MIN

## 2025-03-25 SDOH — HEALTH STABILITY: PHYSICAL HEALTH: ON AVERAGE, HOW MANY DAYS PER WEEK DO YOU ENGAGE IN MODERATE TO STRENUOUS EXERCISE (LIKE A BRISK WALK)?: 2 DAYS

## 2025-03-25 ASSESSMENT — PATIENT HEALTH QUESTIONNAIRE - PHQ9
9. THOUGHTS THAT YOU WOULD BE BETTER OFF DEAD, OR OF HURTING YOURSELF: NOT AT ALL
SUM OF ALL RESPONSES TO PHQ QUESTIONS 1-9: 3
3. TROUBLE FALLING OR STAYING ASLEEP: SEVERAL DAYS
8. MOVING OR SPEAKING SO SLOWLY THAT OTHER PEOPLE COULD HAVE NOTICED. OR THE OPPOSITE, BEING SO FIGETY OR RESTLESS THAT YOU HAVE BEEN MOVING AROUND A LOT MORE THAN USUAL: NOT AT ALL
SUM OF ALL RESPONSES TO PHQ QUESTIONS 1-9: 3
10. IF YOU CHECKED OFF ANY PROBLEMS, HOW DIFFICULT HAVE THESE PROBLEMS MADE IT FOR YOU TO DO YOUR WORK, TAKE CARE OF THINGS AT HOME, OR GET ALONG WITH OTHER PEOPLE: NOT DIFFICULT AT ALL
SUM OF ALL RESPONSES TO PHQ QUESTIONS 1-9: 3
2. FEELING DOWN, DEPRESSED OR HOPELESS: NOT AT ALL
6. FEELING BAD ABOUT YOURSELF - OR THAT YOU ARE A FAILURE OR HAVE LET YOURSELF OR YOUR FAMILY DOWN: NOT AT ALL
1. LITTLE INTEREST OR PLEASURE IN DOING THINGS: NOT AT ALL
4. FEELING TIRED OR HAVING LITTLE ENERGY: SEVERAL DAYS
5. POOR APPETITE OR OVEREATING: SEVERAL DAYS
SUM OF ALL RESPONSES TO PHQ QUESTIONS 1-9: 3
7. TROUBLE CONCENTRATING ON THINGS, SUCH AS READING THE NEWSPAPER OR WATCHING TELEVISION: NOT AT ALL

## 2025-03-25 ASSESSMENT — LIFESTYLE VARIABLES
HOW OFTEN DO YOU HAVE A DRINK CONTAINING ALCOHOL: 2
HOW OFTEN DO YOU HAVE A DRINK CONTAINING ALCOHOL: MONTHLY OR LESS
HOW MANY STANDARD DRINKS CONTAINING ALCOHOL DO YOU HAVE ON A TYPICAL DAY: 1 OR 2
HOW OFTEN DO YOU HAVE SIX OR MORE DRINKS ON ONE OCCASION: 1
HOW MANY STANDARD DRINKS CONTAINING ALCOHOL DO YOU HAVE ON A TYPICAL DAY: 1

## 2025-03-28 ENCOUNTER — OFFICE VISIT (OUTPATIENT)
Dept: INTERNAL MEDICINE | Age: 80
End: 2025-03-28
Payer: MEDICARE

## 2025-03-28 VITALS
HEART RATE: 80 BPM | SYSTOLIC BLOOD PRESSURE: 122 MMHG | OXYGEN SATURATION: 96 % | DIASTOLIC BLOOD PRESSURE: 74 MMHG | HEIGHT: 67 IN | BODY MASS INDEX: 34.21 KG/M2 | WEIGHT: 218 LBS

## 2025-03-28 DIAGNOSIS — M17.0 PRIMARY OSTEOARTHRITIS OF BOTH KNEES: ICD-10-CM

## 2025-03-28 DIAGNOSIS — Z78.0 POSTMENOPAUSAL: ICD-10-CM

## 2025-03-28 DIAGNOSIS — I26.99 BILATERAL PULMONARY EMBOLISM (HCC): ICD-10-CM

## 2025-03-28 DIAGNOSIS — N60.01 BREAST CYST, RIGHT: ICD-10-CM

## 2025-03-28 DIAGNOSIS — Z12.31 SCREENING MAMMOGRAM FOR BREAST CANCER: ICD-10-CM

## 2025-03-28 DIAGNOSIS — K21.9 GASTROESOPHAGEAL REFLUX DISEASE WITHOUT ESOPHAGITIS: ICD-10-CM

## 2025-03-28 DIAGNOSIS — J45.20 MILD INTERMITTENT ASTHMA WITHOUT COMPLICATION: ICD-10-CM

## 2025-03-28 DIAGNOSIS — Z00.00 MEDICARE ANNUAL WELLNESS VISIT, SUBSEQUENT: Primary | ICD-10-CM

## 2025-03-28 DIAGNOSIS — I10 PRIMARY HYPERTENSION: ICD-10-CM

## 2025-03-28 DIAGNOSIS — F41.9 ANXIETY: ICD-10-CM

## 2025-03-28 DIAGNOSIS — Z86.718 HISTORY OF DEEP VEIN THROMBOSIS (DVT) OF LOWER EXTREMITY: ICD-10-CM

## 2025-03-28 PROCEDURE — 1090F PRES/ABSN URINE INCON ASSESS: CPT | Performed by: INTERNAL MEDICINE

## 2025-03-28 PROCEDURE — 3078F DIAST BP <80 MM HG: CPT | Performed by: INTERNAL MEDICINE

## 2025-03-28 PROCEDURE — 99213 OFFICE O/P EST LOW 20 MIN: CPT | Performed by: INTERNAL MEDICINE

## 2025-03-28 PROCEDURE — G0439 PPPS, SUBSEQ VISIT: HCPCS | Performed by: INTERNAL MEDICINE

## 2025-03-28 PROCEDURE — G8427 DOCREV CUR MEDS BY ELIG CLIN: HCPCS | Performed by: INTERNAL MEDICINE

## 2025-03-28 PROCEDURE — 3074F SYST BP LT 130 MM HG: CPT | Performed by: INTERNAL MEDICINE

## 2025-03-28 PROCEDURE — 1123F ACP DISCUSS/DSCN MKR DOCD: CPT | Performed by: INTERNAL MEDICINE

## 2025-03-28 PROCEDURE — G8417 CALC BMI ABV UP PARAM F/U: HCPCS | Performed by: INTERNAL MEDICINE

## 2025-03-28 PROCEDURE — 1159F MED LIST DOCD IN RCRD: CPT | Performed by: INTERNAL MEDICINE

## 2025-03-28 PROCEDURE — 1036F TOBACCO NON-USER: CPT | Performed by: INTERNAL MEDICINE

## 2025-03-28 PROCEDURE — G8399 PT W/DXA RESULTS DOCUMENT: HCPCS | Performed by: INTERNAL MEDICINE

## 2025-03-28 NOTE — PROGRESS NOTES
K/uL    RBC 4.44 4.20 - 5.40 M/uL    Hemoglobin 12.8 12.0 - 16.0 g/dL    Hematocrit 40.0 37.0 - 47.0 %    MCV 90.1 81.0 - 99.0 fL    MCH 28.8 27.0 - 31.0 pg    MCHC 32.0 (L) 33.0 - 37.0 g/dL    RDW 13.0 11.5 - 14.5 %    Platelets 231 130 - 400 K/uL    MPV 10.4 9.4 - 12.3 fL   Microscopic Urinalysis   Result Value Ref Range    Bacteria, UA Negative None Seen /HPF    Crystals, UA NEG None Seen /HPF    Hyaline Casts, UA 6 0 - 8 /HPF    WBC, UA 16 (H) 0 - 5 /HPF    RBC, UA 2 0 - 4 /HPF    Epithelial Cells, UA 4 0 - 5 /HPF       ASSESSMENT/ PLAN:  1. Medicare annual wellness visit, subsequent-Chart, medications, labs, vaccines reviewed.  Keep up to date with routine care and follow up.  Call with any problems or complaints.  Keep up to date with routine screening recomendations and vaccines.   Health maintenance: Stable.  Blood pressure 122/74. Chemistry panel largely unremarkable, slight elevation in CO2 not concerning. Albumin, liver enzymes, CBC, thyroid function, and vitamin D levels within normal range, though vitamin D is on the lower end. Urine sample shows slight increase in white blood cells, likely contamination.  - Continue current vitamin D regimen, transition to OTC 2000 units once supply exhausted.  - Screening mammogram scheduled for 06/2025, then annual mammograms.  - Order bone density test with mammogram.  2. Screening mammogram for breast cancer- History of breast biopsy: Normal.  Biopsy in 04/2024 normal. Follow-up mammogram in 01/2025 normal. Family history of breast cancer (aunt).  - Continue routine mammograms due to family history of breast cancer.  - Screening mammogram scheduled for 06/2025, then annual screenings.  -   MIKE DIGITAL SCREEN W OR WO CAD BILATERAL; Future  3. Postmenopausal  -     DEXA BONE DENSITY 2 SITES; Future  4. Breast cyst, right  5. History of deep vein thrombosis (DVT) of lower extremity- Deep vein thrombosis (DVT): History.  Possibly exacerbated by prolonged sitting while

## 2025-04-08 PROBLEM — Z86.718 HISTORY OF DEEP VEIN THROMBOSIS (DVT) OF LOWER EXTREMITY: Status: ACTIVE | Noted: 2025-04-08

## 2025-04-08 ASSESSMENT — ENCOUNTER SYMPTOMS
ABDOMINAL DISTENTION: 0
SINUS PRESSURE: 0
EYE DISCHARGE: 0
BACK PAIN: 0
COUGH: 0
SHORTNESS OF BREATH: 1
ABDOMINAL PAIN: 0
EYE REDNESS: 0

## 2025-04-08 NOTE — PATIENT INSTRUCTIONS
have a serious illness that gets worse over time or can't be cured?  What are you most afraid of that might happen? (Maybe you're afraid of having pain, losing your independence, or being kept alive by machines.)  Where would you prefer to die? (Your home? A hospital? A nursing home?)  Do you want to donate your organs when you die?  Do you want certain [facility] practices performed before you die?  When should you call for help?  Be sure to contact your doctor if you have any questions.  Where can you learn more?  Go to https://[email or web address].net/patientEd and enter R264 to learn more about \"Advance Directives: Care Instructions.\"  Current as of: March 1, 2024  Content Version: 14.4  © 0221-5764 [email or web address].   Care instructions adapted under license by [employer]. If you have questions about a medical condition or this instruction, always ask your healthcare professional. [employer] disclaims any warranty or liability for your use of this information.         A Healthy Heart: Care Instructions  Overview     Coronary artery disease, also called heart disease, occurs when a substance called plaque builds up in the vessels that supply oxygen-rich blood to your heart muscle. This can narrow the blood vessels and reduce blood flow. A heart attack happens when blood flow is completely blocked. A high-fat diet, smoking, and other factors increase the risk of heart disease.  Your doctor has found that you have a chance of having heart disease. A heart-healthy lifestyle can help keep your heart healthy and prevent heart disease. This lifestyle includes eating healthy, being active, staying at a weight that's healthy for you, and not smoking or using tobacco. It also includes taking medicines as directed, managing other health conditions, and trying to get a healthy amount of sleep.  Follow-up care is a key part of your treatment and safety. Be sure to make and go to all appointments, and call your

## 2025-04-15 ENCOUNTER — PREP FOR PROCEDURE (OUTPATIENT)
Dept: ENT CLINIC | Age: 80
End: 2025-04-15

## 2025-04-15 ENCOUNTER — OFFICE VISIT (OUTPATIENT)
Dept: ENT CLINIC | Age: 80
End: 2025-04-15
Payer: MEDICARE

## 2025-04-15 VITALS
WEIGHT: 220 LBS | SYSTOLIC BLOOD PRESSURE: 136 MMHG | HEIGHT: 67 IN | BODY MASS INDEX: 34.53 KG/M2 | DIASTOLIC BLOOD PRESSURE: 80 MMHG

## 2025-04-15 DIAGNOSIS — J34.3 HYPERTROPHY OF NASAL TURBINATES: ICD-10-CM

## 2025-04-15 DIAGNOSIS — G47.33 OBSTRUCTIVE SLEEP APNEA: ICD-10-CM

## 2025-04-15 DIAGNOSIS — G47.33 OSA (OBSTRUCTIVE SLEEP APNEA): ICD-10-CM

## 2025-04-15 DIAGNOSIS — J34.3 HYPERTROPHY OF INFERIOR NASAL TURBINATE: Primary | ICD-10-CM

## 2025-04-15 PROCEDURE — G8427 DOCREV CUR MEDS BY ELIG CLIN: HCPCS | Performed by: OTOLARYNGOLOGY

## 2025-04-15 PROCEDURE — G8399 PT W/DXA RESULTS DOCUMENT: HCPCS | Performed by: OTOLARYNGOLOGY

## 2025-04-15 PROCEDURE — 1159F MED LIST DOCD IN RCRD: CPT | Performed by: OTOLARYNGOLOGY

## 2025-04-15 PROCEDURE — 3079F DIAST BP 80-89 MM HG: CPT | Performed by: OTOLARYNGOLOGY

## 2025-04-15 PROCEDURE — 3075F SYST BP GE 130 - 139MM HG: CPT | Performed by: OTOLARYNGOLOGY

## 2025-04-15 PROCEDURE — 1123F ACP DISCUSS/DSCN MKR DOCD: CPT | Performed by: OTOLARYNGOLOGY

## 2025-04-15 PROCEDURE — 1090F PRES/ABSN URINE INCON ASSESS: CPT | Performed by: OTOLARYNGOLOGY

## 2025-04-15 PROCEDURE — 99214 OFFICE O/P EST MOD 30 MIN: CPT | Performed by: OTOLARYNGOLOGY

## 2025-04-15 PROCEDURE — 1036F TOBACCO NON-USER: CPT | Performed by: OTOLARYNGOLOGY

## 2025-04-15 PROCEDURE — G8417 CALC BMI ABV UP PARAM F/U: HCPCS | Performed by: OTOLARYNGOLOGY

## 2025-04-15 ASSESSMENT — ENCOUNTER SYMPTOMS
RESPIRATORY NEGATIVE: 1
ALLERGIC/IMMUNOLOGIC NEGATIVE: 1
GASTROINTESTINAL NEGATIVE: 1
EYES NEGATIVE: 1

## 2025-04-15 NOTE — PROGRESS NOTES
4/15/2025    Mel Damon (:  1945) is a 79 y.o. female, Established patient, here for evaluation of the following chief complaint(s):  Follow-up (Dry mouth, nasal congestion and drainage, and fluid sensation in ears)      Vitals:    04/15/25 1054   BP: 136/80   Weight: 99.8 kg (220 lb)   Height: 1.702 m (5' 7\")       Wt Readings from Last 3 Encounters:   04/15/25 99.8 kg (220 lb)   25 98.9 kg (218 lb)   25 83.9 kg (185 lb)       BP Readings from Last 3 Encounters:   04/15/25 136/80   25 122/74   25 120/74         SUBJECTIVE/OBJECTIVE:    Patient seen today for her nose.  I seen her in the past for turbinate hypertrophy but the last my saw her she said montelukast that her primary care gave her helped her.  She now cannot breathe again and she says she has got a dry mouth from the CPAP because she is mouth breathing.        Review of Systems   Constitutional: Negative.    HENT:  Positive for congestion.    Eyes: Negative.    Respiratory: Negative.     Cardiovascular: Negative.    Gastrointestinal: Negative.    Endocrine: Negative.    Musculoskeletal: Negative.    Skin: Negative.    Allergic/Immunologic: Negative.    Neurological: Negative.    Hematological: Negative.    Psychiatric/Behavioral: Negative.          Physical Exam  Vitals reviewed.   Constitutional:       Appearance: Normal appearance. She is normal weight.   HENT:      Head: Normocephalic and atraumatic.      Right Ear: Tympanic membrane, ear canal and external ear normal.      Left Ear: Tympanic membrane, ear canal and external ear normal.      Nose: Nose normal.      Right Turbinates: Enlarged.      Left Turbinates: Enlarged.      Mouth/Throat:      Mouth: Mucous membranes are moist.      Pharynx: Oropharynx is clear.   Eyes:      Extraocular Movements: Extraocular movements intact.      Pupils: Pupils are equal, round, and reactive to light.   Cardiovascular:      Rate and Rhythm: Normal rate and regular rhythm.

## 2025-04-18 ENCOUNTER — TELEPHONE (OUTPATIENT)
Dept: INTERNAL MEDICINE | Age: 80
End: 2025-04-18

## 2025-04-22 NOTE — TELEPHONE ENCOUNTER
Let her know that will be fine with her increasing to 40mg or we could have her stay on the 20mg and add welbutrin with it -- the welbutrin can hel p

## 2025-04-23 RX ORDER — BUPROPION HYDROCHLORIDE 150 MG/1
150 TABLET ORAL EVERY MORNING
Qty: 30 TABLET | Refills: 3 | Status: SHIPPED | OUTPATIENT
Start: 2025-04-23

## 2025-05-05 ENCOUNTER — TELEPHONE (OUTPATIENT)
Dept: INTERNAL MEDICINE | Age: 80
End: 2025-05-05

## 2025-05-05 DIAGNOSIS — N63.0 BREAST NODULE: Primary | ICD-10-CM

## 2025-05-05 DIAGNOSIS — N63.15 UNSPECIFIED LUMP IN THE RIGHT BREAST, OVERLAPPING QUADRANTS: ICD-10-CM

## 2025-05-05 NOTE — TELEPHONE ENCOUNTER
I ordered the ultrasound but likely wont show the clip-- if feels something needs mammogram earlier and since we are getting ultrasound they will want it to be changed to diagnotic - I put in a new order

## 2025-05-05 NOTE — TELEPHONE ENCOUNTER
When they did the right breast biopsy last year, she says that they left a chip and now she thinks that she can feel the chip and wants to have an ultrasound done before her mammogram is due next month.  Can you order a right breast ultrasound?

## 2025-05-07 ENCOUNTER — RESULTS FOLLOW-UP (OUTPATIENT)
Dept: INTERNAL MEDICINE | Age: 80
End: 2025-05-07

## 2025-05-07 ENCOUNTER — HOSPITAL ENCOUNTER (OUTPATIENT)
Dept: WOMENS IMAGING | Age: 80
Discharge: HOME | End: 2025-05-07
Attending: INTERNAL MEDICINE
Payer: MEDICARE

## 2025-05-07 VITALS — BODY MASS INDEX: 28.98 KG/M2 | WEIGHT: 185 LBS

## 2025-05-07 DIAGNOSIS — N63.0 BREAST NODULE: ICD-10-CM

## 2025-05-07 DIAGNOSIS — N63.15 UNSPECIFIED LUMP IN THE RIGHT BREAST, OVERLAPPING QUADRANTS: ICD-10-CM

## 2025-05-07 DIAGNOSIS — Z12.31 SCREENING MAMMOGRAM FOR BREAST CANCER: Primary | ICD-10-CM

## 2025-05-07 PROCEDURE — G0279 TOMOSYNTHESIS, MAMMO: HCPCS

## 2025-05-07 PROCEDURE — 76642 ULTRASOUND BREAST LIMITED: CPT

## 2025-05-12 ENCOUNTER — OFFICE VISIT (OUTPATIENT)
Dept: INTERNAL MEDICINE | Age: 80
End: 2025-05-12
Payer: MEDICARE

## 2025-05-12 VITALS
OXYGEN SATURATION: 96 % | WEIGHT: 215.6 LBS | SYSTOLIC BLOOD PRESSURE: 130 MMHG | BODY MASS INDEX: 33.84 KG/M2 | DIASTOLIC BLOOD PRESSURE: 68 MMHG | HEART RATE: 88 BPM | HEIGHT: 67 IN

## 2025-05-12 DIAGNOSIS — K04.7 DENTAL INFECTION: Primary | ICD-10-CM

## 2025-05-12 DIAGNOSIS — R42 DIZZINESS: ICD-10-CM

## 2025-05-12 DIAGNOSIS — R22.0 SWELLING OF RIGHT SIDE OF FACE: ICD-10-CM

## 2025-05-12 PROCEDURE — G8399 PT W/DXA RESULTS DOCUMENT: HCPCS | Performed by: INTERNAL MEDICINE

## 2025-05-12 PROCEDURE — G8417 CALC BMI ABV UP PARAM F/U: HCPCS | Performed by: INTERNAL MEDICINE

## 2025-05-12 PROCEDURE — 1036F TOBACCO NON-USER: CPT | Performed by: INTERNAL MEDICINE

## 2025-05-12 PROCEDURE — 1123F ACP DISCUSS/DSCN MKR DOCD: CPT | Performed by: INTERNAL MEDICINE

## 2025-05-12 PROCEDURE — 1090F PRES/ABSN URINE INCON ASSESS: CPT | Performed by: INTERNAL MEDICINE

## 2025-05-12 PROCEDURE — 3078F DIAST BP <80 MM HG: CPT | Performed by: INTERNAL MEDICINE

## 2025-05-12 PROCEDURE — 3075F SYST BP GE 130 - 139MM HG: CPT | Performed by: INTERNAL MEDICINE

## 2025-05-12 PROCEDURE — G8427 DOCREV CUR MEDS BY ELIG CLIN: HCPCS | Performed by: INTERNAL MEDICINE

## 2025-05-12 PROCEDURE — 99214 OFFICE O/P EST MOD 30 MIN: CPT | Performed by: INTERNAL MEDICINE

## 2025-05-12 NOTE — PROGRESS NOTES
Chief Complaint   Patient presents with    Oral Swelling     Had a root canal last week and developed an abscess , taking augmentin  Mouth swelling, dizziness       HPI:   History of Present Illness  The patient presents for evaluation of dizziness, dental issues, weight management, and breast pain.    She underwent a root canal on 05/06/2025, initially painless but severe pain 24 hours later. A gum abscess was identified, and the root canal was redone. Prescribed Augmentin and hydrocodone for infection; hydrocodone was ineffective initially but pain subsided. Currently pain-free but notes a large gum bump, slightly reduced. Continues Augmentin three times daily, experiencing diarrhea and cramping. Unable to wear CPAP due to cheek discomfort. Off hydrocodone for two days, can function despite dizziness, not driving since Friday, feels disoriented, may lose balance when walking. Scheduled to drive ex- to dialysis on Wednesday. Eating more but unable to chew on one side due to tooth pain. Follow-up with dentist in three weeks.    Experiencing dizziness for two days, described as woozy, not vertigo. Reports facial swelling and redness, except cheekbone, which is painful. Sinus drainage at night. Ear tenderness and pressure, pain radiating down ear canal. Headache for 36 hours prior to dizziness, generalized, post-root canal. Plans to eat routine meals and sleep tonight.    Considering weight loss medication, interested in Zepbound, covered by insurance. Believes weight loss may alleviate knee pain. Told she is prediabetic based on weight. Height 5'7\", weight 220 lbs.    Had a mammogram last week, felt a small spot behind nipple, initially thought it was implant, not located since. Experiences pain on both sides of breasts.    FAMILY HISTORY  Her brother has diabetes.       Past Medical History:   Diagnosis Date    Anxiety     Asthma     Cerebral artery occlusion with cerebral infarction (HCC) 1986    TIA    GERD

## 2025-05-12 NOTE — PROGRESS NOTES
[facility] - PODIATRY    Today's Date: 06/15/21    Patient Name: Holli Iyer  MRN: [id number]  CSN: [id number]  PCP: Gavino Sanchez MD  Referring Provider: Herminia Rivas APRN    SUBJECTIVE     Chief Complaint   Patient presents with   • Establish Care     pcp02/03/2021- pain around toenail- non diabetic- pt states left 2nd toe nail is curling in towards toe and irritating 2nd toe and great toe- pt pain 1/10- pt present with irregular toe nails.     HPI: Holli Iyer, a 76 y.o.female, comes to clinic as a(n) new patient complaining of ingrown toenail. Patient has h/o GERD, Hyperthyroid, Insomnia, MVP, Sleep Apnea. Patient is non-diabetic.  Patient states that for the past year or more she has had worsening ingrown toenails to both great toes and left second toe.  Denies redness or drainage but has tenderness when the nails are pressed or when wearing shoes.  Feels that they have worsened due to the great toe and second toe abutting each other.  Relates that bunion and hammertoe deformities run in her family. Admits pain at 1/10 level and described as aching and dull. Denies previous treatment. Denies any constitutional symptoms. No other pedal complaints at this time.    Past Medical History:   Diagnosis Date   • Colon polyp    • GERD (gastroesophageal reflux disease)    • Hyperthyroidism     pt states past issue    • Insomnia    • Irregular heart beat    • MVP (mitral valve prolapse)    • Sleep apnea      Past Surgical History:   Procedure Laterality Date   • BREAST BIOPSY Left    • COLONOSCOPY  02/12/2015   • ENDOSCOPY N/A 10/26/2017    Procedure: ESOPHAGOGASTRODUODENOSCOPY WITH ANESTHESIA;  Surgeon: Selena Thomas MD;  Location: [facility] ENDOSCOPY;  Service:      Family History   Problem Relation Age of Onset   • Heart failure Mother 100   • Ulcerative colitis Mother    • Cancer Father    • Diabetes Brother      Social History     Socioeconomic History   • Marital status:      Spouse  Luisito Odonnell is a 42 year old female.    CC:    Chief Complaint   Patient presents with    Full Skin Exam     LOV 07/11/23 with NK.  Pt presents for FBSE. Pt is having on/off dermatitis on both eyelids since last December. Itching/redness/irritation and dryness present. Was swollen and draining this morning per pt.  Past three days used Ketoconazole 2% Ointment which is not helping.Before that, used Triamcinolone 0.1% ointment but didn't help as well.    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   Tobacco Use    Smoking status: Never     Passive exposure: Never    Smokeless tobacco: Never    Tobacco comments:     2 yrs during college   Vaping Use    Vaping status: Never Used   Substance and Sexual Activity    Alcohol use: Yes     Comment: occ    Drug use: No    Sexual activity: Yes     Partners: Male     Birth control/protection: Condom   Other Topics Concern    Grew up on a farm Not Asked    History of tanning Not Asked    Outdoor occupation Not Asked    Pt has a pacemaker No    Pt has a defibrillator No    Breast feeding No    Reaction to local anesthetic No   Social History Narrative    Not on file     Social Drivers of Health     Food Insecurity: Not on file   Transportation Needs: Not on file   Stress: Not on file   Housing Stability: Not on file     Family History[9]    HPI:     HPI:  Chief Complaint   Patient presents with    Full Skin Exam     LOV 07/11/23 with NK.  Pt  name: Not on file   • Number of children: Not on file   • Years of education: Not on file   • Highest education level: Not on file   Tobacco Use   • Smoking status: Never Smoker   • Smokeless tobacco: Never Used   Vaping Use   • Vaping Use: Never used   Substance and Sexual Activity   • Alcohol use: Yes     Comment: galjaylon of wine rarley    • Drug use: No   • Sexual activity: Defer     Allergies   Allergen Reactions   • Penicillins Unknown (See Comments)     childhood   • Declomycin [Demeclocycline] Palpitations   • Sulfa Antibiotics Itching     Current Outpatient Medications   Medication Sig Dispense Refill   • aspirin 81 MG EC tablet Take 81 mg by mouth Daily.     • atenolol (TENORMIN) 25 MG tablet TAKE 1 TABLET DAILY 90 tablet 3   • citalopram (CeleXA) 10 MG tablet TAKE 1 TABLET EVERY MORNING 90 tablet 3   • vitamin D (ERGOCALCIFEROL) 1.25 MG (20531 UT) capsule capsule TAKE 1 CAPSULE TWICE A WEEK (Patient taking differently: 50,000 Units Every 7 (Seven) Days.) 24 capsule 3     No current facility-administered medications for this visit.     Review of Systems   Constitutional: Negative for chills and fever.   HENT: Negative for congestion.    Respiratory: Negative for shortness of breath.    Cardiovascular: Negative for chest pain and leg swelling.   Gastrointestinal: Negative for constipation, diarrhea, nausea and vomiting.   Musculoskeletal:        Foot pain   Skin: Negative for wound.   Neurological: Negative for numbness.       OBJECTIVE     Vitals:    06/15/21 1105   BP: 108/62   Pulse: 60   SpO2: 98%       PHYSICAL EXAM  GEN:   Accompanied by none.     Foot/Ankle Exam:       General:   Appearance: appears stated age and healthy    Orientation: AAOx3    Affect: appropriate    Gait: unimpaired    Assistance: independent    Shoe Gear:  Sandals    VASCULAR      Right Foot Vascularity   Dorsalis pedis:  2+  Posterior tibial:  2+  Skin Temperature: warm    Edema Grading:  None  CFT:  3  Pedal Hair Growth:   presents for FBSE. Pt is having on/off dermatitis on both eyelids since last December. Itching/redness/irritation and dryness present. Was swollen and draining this morning per pt.  Past three days used Ketoconazole 2% Ointment which is not helping.Before that, used Triamcinolone 0.1% ointment but didn't help as well.    Pt has hx of AK  Pt denies personal/family hx of Skin Ca       History of Present Illness  The patient presents with chronic eyelid irritation and inflammation.    Chronic eyelid irritation and inflammation began in December, coinciding with stress from the holidays and quitting their job. Initially, symptoms were limited to one eyelid and included dryness and itchiness. The condition worsened following the passing of their senior dog aggravated by their crying    They initially tried a steroid cream for a few days but stopped due to concerns about its use and lack of noticeable improvement. Recently, they attempted using beef tallow moisturizers without success. They then tried their son's antifungal ketoconazole, which initially seemed to improve the condition, but subsequently led to swelling and oozing of the eyelids.    They are not using any eye shadow and have switched to a 'clean' mascara. They use a tinted moisturizer for sensitive skin. They have been playing tennis and using a sauna four times a week, which involves sweating. They are concerned about whether these activities might affect their condition.    Allergies have been particularly bad this spring, although they do not typically consider themselves an 'allergy person'. They have been crying and rubbing their eyes. They have not been taking any allergy medications.    No new moles or changes in existing moles    Patient presents with concerns above.    Patient has been in their usual state of health.     Past notes/ records and appropriate/relevant lab results including pathology and past body maps reviewed. Including outside  Present  Varicosities: mild varicosities       Left Foot Vascularity   Dorsalis pedis:  2+  Posterior tibial:  2+  Skin Temperature: warm    Edema Grading:  None  CFT:  3  Pedal Hair Growth:  Present  Varicosities: mild varicosities        NEUROLOGIC     Right Foot Neurologic   Normal sensation    Light touch sensation:  Normal  Vibratory sensation:  Normal  Hot/Cold sensation: normal    Protective Sensation using Goldonna-Troy Monofilament:  10     Left Foot Neurologic   Normal sensation    Light touch sensation:  Normal  Vibratory sensation:  Normal  Hot/cold sensation: normal    Protective Sensation using Goldonna-Troy Monofilament:  10     MUSCULOSKELETAL      Right Foot Musculoskeletal   Ecchymosis:  None  Tenderness: great toe metatarsophalangeal joint and toe 1    Arch:  Normal  Hammertoe:  Second toe  Hallux valgus: Yes (Medial bony eminence with mild abduction of hallux. Somewhat reducible. )       Left Foot Musculoskeletal   Ecchymosis:  None  Tenderness: great toe metatarsophalangeal joint, toe 1 and toe 2    Arch:  Normal  Hammertoe:  Second toe  Hallux valgus: Yes (Medial bony eminence with mild abduction of hallux. Somewhat reducible.)       MUSCLE STRENGTH     Right Foot Muscle Strength   Foot dorsiflexion:  5  Foot plantar flexion:  5  Foot inversion:  5  Foot eversion:  5     Left Foot Muscle Strength   Foot dorsiflexion:  5  Foot plantar flexion:  5  Foot inversion:  5  Foot eversion:  5     RANGE OF MOTION      Right Foot Range of Motion   Foot and ankle ROM within normal limits       Left Foot Range of Motion   Foot and ankle ROM within normal limits       DERMATOLOGIC     Right Foot Dermatologic   Skin: skin intact    Nails: ingrown toenail (hallux lateral border)       Left Foot Dermatologic   Skin: skin intact    Nails: ingrown toenail (hallux lateral border and 2nd toe medial border.)        RADIOLOGY/NUCLEAR:  No results found.    LABORATORY/CULTURE RESULTS:      PATHOLOGY RESULTS:      notes/ PCP notes as appropriate. Updated and new information noted in current visit.     ROS:  Denies other relevant systemic complaints. See HPI.     History, medications, allergies reviewed as noted.    Allergies:  Patient has no known allergies.      There were no vitals filed for this visit.    Physical Examination:     Well-developed well-nourished patient alert oriented in no acute distress.  Exam performed of appropriate involved areas    Physical Exam  SKIN: Eyelid inflamed. Large brown spot on back. Sun spots present. Moles normal.    Multiple light to medium brown, well marginated, uniformly pigmented, macules and papules 6 mm and less scattered on exam. pigmented lesions examined with dermoscopy benign-appearing patterns.     Waxy tannish keratotic papules scattered, cherry-red vascular papules scattered.    See map today's date for lesions noted .  See assessment and plan below for specific lesions.    Otherwise remarkable for lesions as noted on map.    See A/P  below for additional information:    Assessment / plan:    Results  PATHOLOGY  Skin biopsy: Solar lentigines    No orders of the defined types were placed in this encounter.      Meds & Refills for this Visit:  Requested Prescriptions     Signed Prescriptions Disp Refills    Fluocinolone Acetonide 0.025 % External Ointment 15 g 0     Sig: Apply a small amount to affected area bid         Encounter Diagnoses   Name Primary?    Dermatitis Yes    Multiple nevi     SK (seborrheic keratosis)     Lentigo     Benign neoplasm of skin, unspecified location     Skin cancer screening     History of actinic keratosis        Assessment & Plan  Eyelid dermatitis  Chronic eyelid dermatitis with recent exacerbation characterized by dryness, pruritus, swelling, and oozing. Initial treatment with a steroid cream was ineffective, and ketoconazole antifungal cream caused irritation. The condition may be exacerbated by environmental factors such as allergens and    ASSESSMENT/PLAN     Diagnoses and all orders for this visit:    1. Ingrown toenail (Primary)    2. Foot pain, left    3. Hallux valgus, left  -     XR Foot 3+ View Bilateral; Future    4. Hallux valgus, right  -     XR Foot 3+ View Bilateral; Future    5. Hammer toes of both feet  -     XR Foot 3+ View Bilateral; Future      Comprehensive lower extremity examination and evaluation was performed.  Discussed findings and treatment plan including risks, benefits, and treatment options with patient in detail. Patient agreed with treatment plan.  After verbal consent obtained, nail(s) x3 debrided of offending borders with nail nipper without incidence  Patient may maintain nails and calluses at home utilizing emery board or pumice stone between visits as needed   Discussed potential PNAs with matrixectomy. Will defer for now.   Xrays to evaluate foot structure.  Patient is somewhat interested in surgical correction of foot deformities.  An After Visit Summary was printed and given to the patient at discharge, including (if requested) any available informative/educational handouts regarding diagnosis, treatment, or medications. All questions were answered to patient/family satisfaction. Should symptoms fail to improve or worsen they agree to call or return to clinic or to go to the Emergency Department. Discussed the importance of following up with any needed screening tests/labs/specialist appointments and any requested follow-up recommended by me today. Importance of maintaining follow-up discussed and patient accepts that missed appointments can delay diagnosis and potentially lead to worsening of conditions.  Return in about 3 months (around 9/15/2021)., or sooner if acute issues arise.        This document has been electronically signed by Shorty Raymond DPM on Sofía 15, 2021 11:46 CDT        emotional stress. Differential diagnosis includes contact dermatitis, but current presentation aligns more with dermatitis exacerbated by irritants and allergens. Fluocinolone ointment is recommended for its stronger potency and better tolerability compared to triamcinolone, aiming to quickly reduce inflammation and treatment duration.  - Discontinue ketoconazole antifungal cream.  - Prescribe fluocinolone ointment for eyelid application, to be used sparingly twice daily until symptoms resolve.  - Consider an antihistamine such as cetirizine to address potential allergen-related inflammation.  - Advise cautious use of saunas to prevent further skin irritation from heat and sweat.    Benign-appearing nevi, no atypical features on dermoscopy reassurance given monitor.     Waxy tan keratotic papules lesions in areas of concern as noted reassurance given.  Benign nature discussed.  Possibility of cryo, alphahydroxy acids over-the-counter retinol's discussed.     No other susupicious lesions on todays  exam.    Please refer to map for specific lesions.  See additional diagnoses.  Pros cons of various therapies, risks benefits discussed.Pathophysiology, terapeutic options reviewed.  See  Medications in grid.  Instructions reviewed at length.    Benign nevi, seborrheic  keratoses, cherry angiomas:  Reassurance regarding other benign skin lesions.    Monitor for new or changing lesions. Signs and symptoms of skin cancer, ABCDE's of melanoma ( additional information available at AAD.org, skincancer.org) Encourage Sunscreen (broad-spectrum, ideally mineral-based-UVA/UVB -SPF 30 or higher) use encouraged, sun protection/sun protective clothing, self exams reviewed Followup as noted RTC ---routine checkup 6 mos -one year or p.r.n.    Encounter Times   Including precharting, reviewing chart, prior notes obtaining history: 10 minutes, medical exam :10 minutes, notes on body map, plan, counseling 10minutes My total time spent  caring for the patient on the day of the encounter: 30 minutes     The patient indicates understanding of these issues and agrees to the plan.  The patient is asked to return as noted in follow-up/ above.    This note was generated using Dragon voice recognition software.  Please contact me regarding any confusion resulting from errors in recognition..  Note to patient and family: The  Century Cures Act makes medical notes like these available to patients. However, be advised this is a medical document. It is intended as [id number] communication and monitoring of a patient's care needs. It is written in medical language and may contain abbreviations or verbiage that are unfamiliar. It may appear blunt or direct. Medical documents are intended to carry relevant information, facts as evident and the clinical opinion of the practitioner.       [1] History reviewed. No pertinent past medical history.  [2]   Past Surgical History:  Procedure Laterality Date          [facility]      ?    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   Tobacco Use    Smoking status: Never     Passive exposure: Never    Smokeless tobacco: Never    Tobacco comments:     2 yrs during college   Vaping Use    Vaping status: Never Used   Substance and Sexual Activity    Alcohol use: Yes     Comment: occ    Drug use: No    Sexual activity: Yes     Partners: Male     Birth control/protection: Condom   Other Topics Concern    Pt has a pacemaker No    Pt has  a defibrillator No    Breast feeding No    Reaction to local anesthetic No   [5]   Current Outpatient Medications   Medication Sig Dispense Refill    Fluocinolone Acetonide 0.025 % External Ointment Apply a small amount to affected area bid 15 g 0    Azelaic Acid (FINACEA) 15 % External Gel Use bid as directed to face 50 g 6   [6] No Known Allergies  [7] History reviewed. No pertinent past medical history.  [8]   Past Surgical History:  Procedure Laterality Date          [facility]      ?/    Other surgical history      rhinoplasty   [9]   Family History  Problem Relation Age of Onset    Arthritis Maternal Grandmother         rheumatoid    Hypertension Maternal Grandfather     Stroke Maternal Grandfather     High Cholesterol Maternal Grandfather     High Cholesterol Paternal Grandmother     Hypertension Paternal Grandmother

## 2025-05-19 ENCOUNTER — HOSPITAL ENCOUNTER (OUTPATIENT)
Dept: PREADMISSION TESTING | Age: 80
Discharge: HOME | End: 2025-05-23
Payer: MEDICARE

## 2025-05-19 VITALS — HEIGHT: 67 IN | BODY MASS INDEX: 34.59 KG/M2 | WEIGHT: 220.4 LBS

## 2025-05-19 LAB
ANION GAP SERPL CALCULATED.3IONS-SCNC: 12 MMOL/L (ref 8–16)
APTT PPP: 24.5 SEC (ref 26–36.2)
BUN SERPL-MCNC: 16 MG/DL (ref 8–23)
CALCIUM SERPL-MCNC: 9.3 MG/DL (ref 8.8–10.2)
CHLORIDE SERPL-SCNC: 108 MMOL/L (ref 98–107)
CO2 SERPL-SCNC: 22 MMOL/L (ref 22–29)
CREAT SERPL-MCNC: 0.7 MG/DL (ref 0.5–0.9)
EKG P AXIS: 45 DEGREES
EKG P-R INTERVAL: 178 MS
EKG Q-T INTERVAL: 440 MS
EKG QRS DURATION: 88 MS
EKG QTC CALCULATION (BAZETT): 443 MS
EKG T AXIS: 60 DEGREES
ERYTHROCYTE [DISTWIDTH] IN BLOOD BY AUTOMATED COUNT: 12.5 % (ref 11.5–14.5)
GLUCOSE SERPL-MCNC: 98 MG/DL (ref 70–99)
HCT VFR BLD AUTO: 39.6 % (ref 37–47)
HGB BLD-MCNC: 12.4 G/DL (ref 12–16)
INR PPP: 1.18 (ref 0.88–1.18)
MCH RBC QN AUTO: 28.6 PG (ref 27–31)
MCHC RBC AUTO-ENTMCNC: 31.3 G/DL (ref 33–37)
MCV RBC AUTO: 91.2 FL (ref 81–99)
PLATELET # BLD AUTO: 256 K/UL (ref 130–400)
PMV BLD AUTO: 10.4 FL (ref 9.4–12.3)
POTASSIUM SERPL-SCNC: 4.4 MMOL/L (ref 3.5–5.1)
PROTHROMBIN TIME: 15 SEC (ref 12–14.6)
RBC # BLD AUTO: 4.34 M/UL (ref 4.2–5.4)
SODIUM SERPL-SCNC: 142 MMOL/L (ref 136–145)
WBC # BLD AUTO: 6.9 K/UL (ref 4.8–10.8)

## 2025-05-19 PROCEDURE — 85610 PROTHROMBIN TIME: CPT

## 2025-05-19 PROCEDURE — 85730 THROMBOPLASTIN TIME PARTIAL: CPT

## 2025-05-19 PROCEDURE — 93010 ELECTROCARDIOGRAM REPORT: CPT | Performed by: INTERNAL MEDICINE

## 2025-05-19 PROCEDURE — 85027 COMPLETE CBC AUTOMATED: CPT

## 2025-05-19 PROCEDURE — 80048 BASIC METABOLIC PNL TOTAL CA: CPT

## 2025-05-19 PROCEDURE — 93005 ELECTROCARDIOGRAM TRACING: CPT | Performed by: ANESTHESIOLOGY

## 2025-05-19 NOTE — DISCHARGE INSTRUCTIONS
The day before surgery you will receive a phone call from the surgery nurse to let you know what time to arrive on the day of surgery. This call will usually be between 2-4 PM. If you do not receive a phone call by 4 PM the day before your surgery please call [phone] and let them know you have not received an arrival time. If your surgery is on Monday, your call will be on the Friday before your Monday surgery. Please check your voicemail as they may leave a message with that information.  If you are running late or wake up sick the day of surgery please call the above number for further instructions.        The morning of surgery, you may take all your prescribed medications with a sip of water unless instructed not to take.  Any exceptions to this would be listed below:  Always follow your surgeon or providers instructions on taking blood thinners (ELIQUIS).    TAKE YOUR ATENOLOL THE MORNING OF SURGERY.    PREOPERATIVE GUIDELINES WHEN RECEIVING ANESTHESIA    Do not eat anything after midnight the night before your surgery. You may have water up to 2 hours before your arrival time. No gum or candy the morning of surgery.  This is extremely important for your safety.    Take a bath (or shower) the night before your surgery and you may brush your teeth the morning of your surgery.    Morning of surgery no Nicotine products including smoking, vaping, patches, dip or snuff.     You will be scheduled to arrive at the hospital 2 hours before your surgery, or follow your surgeon's instructions.    Dress comfortably.  Wear loose clothing that will be easy to remove and comfortable for your trip home.    You may wear eyeglasses but bring your cases with you as they must be remove before your surgery. If you wear contacts they will have to be removed before your surgery.    Hearing aids and dentures will need to be removed before your surgery. If you wear dentures, do not glue them in the morning of surgery.     Do not

## 2025-05-20 ENCOUNTER — HOSPITAL ENCOUNTER (OUTPATIENT)
Dept: WOMENS IMAGING | Age: 80
Discharge: HOME | End: 2025-05-20
Attending: INTERNAL MEDICINE
Payer: MEDICARE

## 2025-05-20 DIAGNOSIS — Z78.0 POSTMENOPAUSAL: ICD-10-CM

## 2025-05-20 DIAGNOSIS — Z12.31 SCREENING MAMMOGRAM FOR BREAST CANCER: ICD-10-CM

## 2025-05-20 PROCEDURE — 77080 DXA BONE DENSITY AXIAL: CPT

## 2025-05-23 ENCOUNTER — RESULTS FOLLOW-UP (OUTPATIENT)
Dept: INTERNAL MEDICINE | Age: 80
End: 2025-05-23

## 2025-06-03 ENCOUNTER — OFFICE VISIT (OUTPATIENT)
Dept: INTERNAL MEDICINE | Age: 80
End: 2025-06-03

## 2025-06-03 VITALS
OXYGEN SATURATION: 95 % | WEIGHT: 217 LBS | HEIGHT: 67 IN | BODY MASS INDEX: 34.06 KG/M2 | DIASTOLIC BLOOD PRESSURE: 60 MMHG | HEART RATE: 76 BPM | SYSTOLIC BLOOD PRESSURE: 118 MMHG

## 2025-06-03 DIAGNOSIS — R60.0 LOCALIZED EDEMA: Primary | ICD-10-CM

## 2025-06-03 DIAGNOSIS — F41.9 ANXIETY: ICD-10-CM

## 2025-06-03 DIAGNOSIS — M17.0 PRIMARY OSTEOARTHRITIS OF BOTH KNEES: ICD-10-CM

## 2025-06-03 DIAGNOSIS — I10 PRIMARY HYPERTENSION: ICD-10-CM

## 2025-06-03 DIAGNOSIS — F32.9 REACTIVE DEPRESSION: ICD-10-CM

## 2025-06-05 DIAGNOSIS — G89.18 POSTOPERATIVE PAIN: Primary | ICD-10-CM

## 2025-06-05 RX ORDER — OXYCODONE AND ACETAMINOPHEN 5; 325 MG/1; MG/1
1 TABLET ORAL EVERY 6 HOURS PRN
Qty: 12 TABLET | Refills: 0 | Status: SHIPPED | OUTPATIENT
Start: 2025-06-05 | End: 2025-06-08

## 2025-06-05 ASSESSMENT — ENCOUNTER SYMPTOMS
EYES NEGATIVE: 1
RESPIRATORY NEGATIVE: 1
GASTROINTESTINAL NEGATIVE: 1
ALLERGIC/IMMUNOLOGIC NEGATIVE: 1

## 2025-06-05 NOTE — H&P
2025    Mel Damon (:  1945) is a 80 y.o. female, Established patient, here for evaluation of the following chief complaint(s):  No chief complaint on file.      There were no vitals filed for this visit.      Wt Readings from Last 3 Encounters:   25 98.4 kg (217 lb)   25 100 kg (220 lb 6.4 oz)   25 97.8 kg (215 lb 9.6 oz)       BP Readings from Last 3 Encounters:   25 118/60   25 130/68   04/15/25 136/80         SUBJECTIVE/OBJECTIVE:    Patient seen today for her nose.  I seen her in the past for turbinate hypertrophy but the last my saw her she said montelukast that her primary care gave her helped her.  She now cannot breathe again and she says she has got a dry mouth from the CPAP because she is mouth breathing.        Review of Systems   Constitutional: Negative.    HENT:  Positive for congestion.    Eyes: Negative.    Respiratory: Negative.     Cardiovascular: Negative.    Gastrointestinal: Negative.    Endocrine: Negative.    Musculoskeletal: Negative.    Skin: Negative.    Allergic/Immunologic: Negative.    Neurological: Negative.    Hematological: Negative.    Psychiatric/Behavioral: Negative.          Physical Exam  Vitals reviewed.   Constitutional:       Appearance: Normal appearance. She is normal weight.   HENT:      Head: Normocephalic and atraumatic.      Right Ear: Tympanic membrane, ear canal and external ear normal.      Left Ear: Tympanic membrane, ear canal and external ear normal.      Nose: Nose normal.      Right Turbinates: Enlarged.      Left Turbinates: Enlarged.      Mouth/Throat:      Mouth: Mucous membranes are moist.      Pharynx: Oropharynx is clear.   Eyes:      Extraocular Movements: Extraocular movements intact.      Pupils: Pupils are equal, round, and reactive to light.   Cardiovascular:      Rate and Rhythm: Normal rate and regular rhythm.   Pulmonary:      Effort: Pulmonary effort is normal.      Breath sounds: Normal breath

## 2025-06-06 ENCOUNTER — ANESTHESIA (OUTPATIENT)
Dept: OPERATING ROOM | Age: 80
End: 2025-06-06
Payer: MEDICARE

## 2025-06-06 ENCOUNTER — ANESTHESIA EVENT (OUTPATIENT)
Dept: OPERATING ROOM | Age: 80
End: 2025-06-06
Payer: MEDICARE

## 2025-06-06 ENCOUNTER — HOSPITAL ENCOUNTER (OUTPATIENT)
Age: 80
Setting detail: OUTPATIENT SURGERY
Discharge: HOME | End: 2025-06-06
Attending: OTOLARYNGOLOGY | Admitting: OTOLARYNGOLOGY
Payer: MEDICARE

## 2025-06-06 VITALS
WEIGHT: 217 LBS | RESPIRATION RATE: 16 BRPM | SYSTOLIC BLOOD PRESSURE: 107 MMHG | HEIGHT: 67 IN | OXYGEN SATURATION: 98 % | BODY MASS INDEX: 34.06 KG/M2 | HEART RATE: 69 BPM | DIASTOLIC BLOOD PRESSURE: 55 MMHG | TEMPERATURE: 97.5 F

## 2025-06-06 PROCEDURE — 6370000000 HC RX 637 (ALT 250 FOR IP): Performed by: OTOLARYNGOLOGY

## 2025-06-06 PROCEDURE — 7100000000 HC PACU RECOVERY - FIRST 15 MIN: Performed by: OTOLARYNGOLOGY

## 2025-06-06 PROCEDURE — 6360000002 HC RX W HCPCS: Performed by: OTOLARYNGOLOGY

## 2025-06-06 PROCEDURE — 2580000003 HC RX 258: Performed by: ANESTHESIOLOGY

## 2025-06-06 PROCEDURE — 6360000002 HC RX W HCPCS: Performed by: ANESTHESIOLOGY

## 2025-06-06 PROCEDURE — 2720000010 HC SURG SUPPLY STERILE: Performed by: OTOLARYNGOLOGY

## 2025-06-06 PROCEDURE — 6360000002 HC RX W HCPCS: Performed by: NURSE ANESTHETIST, CERTIFIED REGISTERED

## 2025-06-06 PROCEDURE — 30802 ABLATE INF TURBINATE SUBMUC: CPT | Performed by: OTOLARYNGOLOGY

## 2025-06-06 PROCEDURE — 2500000003 HC RX 250 WO HCPCS: Performed by: ANESTHESIOLOGY

## 2025-06-06 PROCEDURE — 7100000001 HC PACU RECOVERY - ADDTL 15 MIN: Performed by: OTOLARYNGOLOGY

## 2025-06-06 PROCEDURE — 2709999900 HC NON-CHARGEABLE SUPPLY: Performed by: OTOLARYNGOLOGY

## 2025-06-06 PROCEDURE — 2580000003 HC RX 258: Performed by: NURSE ANESTHETIST, CERTIFIED REGISTERED

## 2025-06-06 PROCEDURE — 3700000000 HC ANESTHESIA ATTENDED CARE: Performed by: OTOLARYNGOLOGY

## 2025-06-06 PROCEDURE — 7100000010 HC PHASE II RECOVERY - FIRST 15 MIN: Performed by: OTOLARYNGOLOGY

## 2025-06-06 PROCEDURE — 3600000004 HC SURGERY LEVEL 4 BASE: Performed by: OTOLARYNGOLOGY

## 2025-06-06 PROCEDURE — 7100000011 HC PHASE II RECOVERY - ADDTL 15 MIN: Performed by: OTOLARYNGOLOGY

## 2025-06-06 RX ORDER — SODIUM CHLORIDE 0.9 % (FLUSH) 0.9 %
5-40 SYRINGE (ML) INJECTION EVERY 12 HOURS SCHEDULED
Status: DISCONTINUED | OUTPATIENT
Start: 2025-06-06 | End: 2025-06-06 | Stop reason: HOSPADM

## 2025-06-06 RX ORDER — SODIUM CHLORIDE 9 MG/ML
INJECTION, SOLUTION INTRAVENOUS PRN
Status: DISCONTINUED | OUTPATIENT
Start: 2025-06-06 | End: 2025-06-06 | Stop reason: HOSPADM

## 2025-06-06 RX ORDER — ONDANSETRON 2 MG/ML
INJECTION INTRAMUSCULAR; INTRAVENOUS
Status: DISCONTINUED | OUTPATIENT
Start: 2025-06-06 | End: 2025-06-06 | Stop reason: SDUPTHER

## 2025-06-06 RX ORDER — DEXAMETHASONE SODIUM PHOSPHATE 4 MG/ML
4 INJECTION, SOLUTION INTRA-ARTICULAR; INTRALESIONAL; INTRAMUSCULAR; INTRAVENOUS; SOFT TISSUE ONCE
Status: COMPLETED | OUTPATIENT
Start: 2025-06-06 | End: 2025-06-06

## 2025-06-06 RX ORDER — HYDROMORPHONE HYDROCHLORIDE 1 MG/ML
0.25 INJECTION, SOLUTION INTRAMUSCULAR; INTRAVENOUS; SUBCUTANEOUS EVERY 5 MIN PRN
Status: DISCONTINUED | OUTPATIENT
Start: 2025-06-06 | End: 2025-06-06 | Stop reason: HOSPADM

## 2025-06-06 RX ORDER — SODIUM CHLORIDE, SODIUM LACTATE, POTASSIUM CHLORIDE, CALCIUM CHLORIDE 600; 310; 30; 20 MG/100ML; MG/100ML; MG/100ML; MG/100ML
INJECTION, SOLUTION INTRAVENOUS CONTINUOUS
Status: DISCONTINUED | OUTPATIENT
Start: 2025-06-06 | End: 2025-06-06 | Stop reason: HOSPADM

## 2025-06-06 RX ORDER — SODIUM CHLORIDE 0.9 % (FLUSH) 0.9 %
5-40 SYRINGE (ML) INJECTION PRN
Status: DISCONTINUED | OUTPATIENT
Start: 2025-06-06 | End: 2025-06-06 | Stop reason: HOSPADM

## 2025-06-06 RX ORDER — LIDOCAINE HYDROCHLORIDE 10 MG/ML
INJECTION, SOLUTION EPIDURAL; INFILTRATION; INTRACAUDAL; PERINEURAL
Status: DISCONTINUED | OUTPATIENT
Start: 2025-06-06 | End: 2025-06-06 | Stop reason: SDUPTHER

## 2025-06-06 RX ORDER — APREPITANT 40 MG/1
40 CAPSULE ORAL ONCE
Status: COMPLETED | OUTPATIENT
Start: 2025-06-06 | End: 2025-06-06

## 2025-06-06 RX ORDER — SODIUM CHLORIDE, SODIUM LACTATE, POTASSIUM CHLORIDE, CALCIUM CHLORIDE 600; 310; 30; 20 MG/100ML; MG/100ML; MG/100ML; MG/100ML
INJECTION, SOLUTION INTRAVENOUS
Status: DISCONTINUED | OUTPATIENT
Start: 2025-06-06 | End: 2025-06-06 | Stop reason: SDUPTHER

## 2025-06-06 RX ORDER — HYDROMORPHONE HYDROCHLORIDE 1 MG/ML
0.5 INJECTION, SOLUTION INTRAMUSCULAR; INTRAVENOUS; SUBCUTANEOUS EVERY 5 MIN PRN
Status: DISCONTINUED | OUTPATIENT
Start: 2025-06-06 | End: 2025-06-06 | Stop reason: HOSPADM

## 2025-06-06 RX ORDER — LIDOCAINE HYDROCHLORIDE AND EPINEPHRINE 10; 10 MG/ML; UG/ML
INJECTION, SOLUTION INFILTRATION; PERINEURAL PRN
Status: DISCONTINUED | OUTPATIENT
Start: 2025-06-06 | End: 2025-06-06 | Stop reason: ALTCHOICE

## 2025-06-06 RX ORDER — FENTANYL CITRATE 50 UG/ML
INJECTION, SOLUTION INTRAMUSCULAR; INTRAVENOUS
Status: DISCONTINUED | OUTPATIENT
Start: 2025-06-06 | End: 2025-06-06 | Stop reason: SDUPTHER

## 2025-06-06 RX ORDER — ONDANSETRON 2 MG/ML
4 INJECTION INTRAMUSCULAR; INTRAVENOUS
Status: DISCONTINUED | OUTPATIENT
Start: 2025-06-06 | End: 2025-06-06 | Stop reason: HOSPADM

## 2025-06-06 RX ORDER — NALOXONE HYDROCHLORIDE 0.4 MG/ML
INJECTION, SOLUTION INTRAMUSCULAR; INTRAVENOUS; SUBCUTANEOUS PRN
Status: DISCONTINUED | OUTPATIENT
Start: 2025-06-06 | End: 2025-06-06 | Stop reason: HOSPADM

## 2025-06-06 RX ORDER — OXYMETAZOLINE HYDROCHLORIDE 0.05 G/100ML
SPRAY NASAL PRN
Status: DISCONTINUED | OUTPATIENT
Start: 2025-06-06 | End: 2025-06-06 | Stop reason: ALTCHOICE

## 2025-06-06 RX ORDER — PROPOFOL 10 MG/ML
INJECTION, EMULSION INTRAVENOUS
Status: DISCONTINUED | OUTPATIENT
Start: 2025-06-06 | End: 2025-06-06 | Stop reason: SDUPTHER

## 2025-06-06 RX ORDER — DEXAMETHASONE SODIUM PHOSPHATE 10 MG/ML
INJECTION, SOLUTION INTRAMUSCULAR; INTRAVENOUS
Status: DISCONTINUED | OUTPATIENT
Start: 2025-06-06 | End: 2025-06-06 | Stop reason: SDUPTHER

## 2025-06-06 RX ADMIN — DEXAMETHASONE SODIUM PHOSPHATE 4 MG: 4 INJECTION INTRA-ARTICULAR; INTRALESIONAL; INTRAMUSCULAR; INTRAVENOUS; SOFT TISSUE at 12:10

## 2025-06-06 RX ADMIN — APREPITANT 40 MG: 40 CAPSULE ORAL at 12:10

## 2025-06-06 RX ADMIN — PROPOFOL 200 MG: 10 INJECTION, EMULSION INTRAVENOUS at 13:12

## 2025-06-06 RX ADMIN — FENTANYL CITRATE 100 MCG: 0.05 INJECTION, SOLUTION INTRAMUSCULAR; INTRAVENOUS at 13:16

## 2025-06-06 RX ADMIN — ONDANSETRON 4 MG: 2 INJECTION INTRAMUSCULAR; INTRAVENOUS at 13:15

## 2025-06-06 RX ADMIN — SODIUM CHLORIDE, SODIUM LACTATE, POTASSIUM CHLORIDE, AND CALCIUM CHLORIDE: .6; .31; .03; .02 INJECTION, SOLUTION INTRAVENOUS at 11:54

## 2025-06-06 RX ADMIN — SODIUM CHLORIDE, PRESERVATIVE FREE 20 MG: 5 INJECTION INTRAVENOUS at 12:10

## 2025-06-06 RX ADMIN — LIDOCAINE HYDROCHLORIDE 50 MG: 10 INJECTION, SOLUTION EPIDURAL; INFILTRATION; INTRACAUDAL; PERINEURAL at 13:14

## 2025-06-06 RX ADMIN — SODIUM CHLORIDE, SODIUM LACTATE, POTASSIUM CHLORIDE, AND CALCIUM CHLORIDE: 600; 310; 30; 20 INJECTION, SOLUTION INTRAVENOUS at 13:10

## 2025-06-06 RX ADMIN — DEXAMETHASONE SODIUM PHOSPHATE 10 MG: 10 INJECTION, SOLUTION INTRAMUSCULAR; INTRAVENOUS at 13:15

## 2025-06-06 ASSESSMENT — PAIN - FUNCTIONAL ASSESSMENT
PAIN_FUNCTIONAL_ASSESSMENT: NONE - DENIES PAIN
PAIN_FUNCTIONAL_ASSESSMENT: 0-10
PAIN_FUNCTIONAL_ASSESSMENT: NONE - DENIES PAIN

## 2025-06-06 ASSESSMENT — LIFESTYLE VARIABLES: SMOKING_STATUS: 0

## 2025-06-06 NOTE — INTERVAL H&P NOTE
Update History & Physical    The patient's History and Physical of May 22, 2025 was reviewed with the patient and I examined the patient. There was no change. The surgical site was confirmed by the patient and me.     Plan: The risks, benefits, expected outcome, and alternative to the recommended procedure have been discussed with the patient. Patient understands and wants to proceed with the procedure.     Electronically signed by Blayne Chaves MD on 6/6/2025 at 12:24 PM

## 2025-06-06 NOTE — BRIEF OP NOTE
Brief Postoperative Note      Patient: Mel Damon  YOB: 1945  MRN: [id number]    Date of Procedure: 6/6/2025    Pre-Op Diagnosis Codes:      * Hypertrophy of nasal turbinates [J34.3]     * Obstructive sleep apnea [G47.33]    Post-Op Diagnosis: Same       Procedure(s):  Turbinate reduction.    Surgeon(s):  Blayne Chaves MD    Assistant:  * No surgical staff found *    Anesthesia: Choice    Estimated Blood Loss (mL): Minimal    Complications: None    Specimens:   * No specimens in log *    Implants:  * No implants in log *      Drains: * No LDAs found *    Findings:  Infection Present At Time Of Surgery (PATOS) (choose all levels that have infection present):  No infection present  Other Findings: turbinate hypertrophy    Electronically signed by Blayne Chaves MD on 6/6/2025 at 1:23 PM

## 2025-06-06 NOTE — ANESTHESIA PRE PROCEDURE
Department of Anesthesiology  Preprocedure Note       Name:  Mel Damon   Age:  80 y.o.  :  1945                                          MRN:  [id number]         Date:  2025      Surgeon: Surgeon(s):  Blayne Chaves MD    Procedure: Procedure(s):  Turbinate reduction.    Medications prior to admission:   Prior to Admission medications    Medication Sig Start Date End Date Taking? Authorizing Provider   oxyCODONE-acetaminophen (ENDOCET) 5-325 MG per tablet Take 1 tablet by mouth every 6 hours as needed for Pain for up to 3 days. Intended supply: 3 days. Take lowest dose possible to manage pain Max Daily Amount: 4 tablets 25  Blayne Chaves MD   buPROPion (WELLBUTRIN XL) 150 MG extended release tablet Take 1 tablet by mouth every morning  Patient not taking: Reported on 2025   America Smith MD   apixaban (ELIQUIS) 5 MG TABS tablet Take 1 tablet by mouth 2 times daily 3/7/25   America Smith MD   citalopram (CELEXA) 20 MG tablet Take 1 tablet by mouth daily 3/3/25   America Smith MD   fluticasone (FLONASE) 50 MCG/ACT nasal spray 2 sprays by Each Nostril route daily  Patient taking differently: 2 sprays by Nasal route daily as needed for Allergies or Rhinitis 25   America Smith MD   montelukast (SINGULAIR) 10 MG tablet Take 1 tablet by mouth daily 25   America Smith MD   atenolol (TENORMIN) 50 MG tablet TAKE 1 TABLET BY MOUTH DAILY 24  America Smith MD   triamterene-hydroCHLOROthiazide (DYAZIDE) 37.5-25 MG per capsule TAKE 1 CAPSULE BY MOUTH DAILY 24   America Smith MD   ergocalciferol (ERGOCALCIFEROL) 1.25 MG (73476 UT) capsule Take 1 capsule by mouth once a week  Patient taking differently: Take 1 capsule by mouth every 30 days 10/9/24   America Smith MD   cetirizine (ZYRTEC) 10 MG tablet Take 1 tablet by mouth daily  Patient not taking: Reported on 2025 3/27/24   America Smith MD   albuterol sulfate HFA (VENTOLIN HFA) 108 (90 Base)

## 2025-06-06 NOTE — OP NOTE
Operative Note      Patient: Mel Damon  YOB: 1945  MRN: [id number]    Date of Procedure: 6/6/2025    Pre-Op Diagnosis Codes:      * Hypertrophy of nasal turbinates [J34.3]     * Obstructive sleep apnea [G47.33]    Post-Op Diagnosis: Same       Procedure(s):  Turbinate reduction.    Surgeon(s):  Blayne Chaves MD    Assistant:   * No surgical staff found *    Anesthesia: Choice    Estimated Blood Loss (mL): Minimal    Complications: None    Specimens:   * No specimens in log *    Implants:  * No implants in log *      Drains: * No LDAs found *    Findings:  Infection Present At Time Of Surgery (PATOS) (choose all levels that have infection present):  No infection present  Other Findings: Turbinate hypertrophy    Detailed Description of Procedure:   After obtaining informed consent, the patient was taken to the operating room and placed op table in supine position.  After the induction of general LMA anesthesia the patient was prepped in standard fashion for turban ablation.  Once a timeout was performed each inferior turbinate injected with lidocaine with epinephrine.  Once this took effect the wand was inserted to the left inferior turbinate and activated for 20 seconds.  It was then reapproximated activated again.  Once it was complete the turbinate was outfractured with a Sehr elevator.  The right inferior turbinate dressed similar fashion.  Afrin-soaked pledgets were then placed bilaterally and the patient was returned to anesthesia having suffered no complications.    Electronically signed by Blayne Chaves MD on 6/6/2025 at 1:29 PM

## 2025-06-06 NOTE — DISCHARGE INSTRUCTIONS
Please call Dr. Chaves with questions or concerns.  Pain meds as needed and expect some bleeding for a day or 2.  Follow-up in about a month.

## 2025-06-06 NOTE — ANESTHESIA POSTPROCEDURE EVALUATION
Department of Anesthesiology  Postprocedure Note    Patient: Mel Damon  MRN: [id number]  YOB: 1945  Date of evaluation: 6/6/2025    Procedure Summary       Date: 06/06/25 Room / Location: [address]    Anesthesia Start: 1310 Anesthesia Stop: 1327    Procedure: Turbinate reduction. (Bilateral) Diagnosis:       Hypertrophy of nasal turbinates      Obstructive sleep apnea      (Hypertrophy of nasal turbinates [J34.3])      (Obstructive sleep apnea [G47.33])    Surgeons: Blayne Chaves MD Responsible Provider: Lang Bhatti APRN - CRNA    Anesthesia Type: general ASA Status: 3            Anesthesia Type: No value filed.    Mazin Phase I: Mazin Score: 10    Mazin Phase II:      Anesthesia Post Evaluation    Patient location during evaluation: bedside  Patient participation: complete - patient participated  Level of consciousness: awake and alert  Pain score: 0  Airway patency: patent  Nausea & Vomiting: no nausea  Cardiovascular status: hemodynamically stable  Respiratory status: acceptable  Hydration status: euvolemic  Comments: BP 99/64   Pulse 76   Temp 98 °F (36.7 °C)   Resp 12   Ht 1.702 m (5' 7\")   Wt 98.4 kg (217 lb)   SpO2 92%   BMI 33.99 kg/m²       No notable events documented.

## 2025-06-08 PROBLEM — Z86.711 HISTORY OF PULMONARY EMBOLISM: Status: ACTIVE | Noted: 2024-07-28

## 2025-06-08 PROBLEM — F32.9 REACTIVE DEPRESSION: Status: ACTIVE | Noted: 2025-06-08

## 2025-06-09 ENCOUNTER — TELEPHONE (OUTPATIENT)
Dept: INTERNAL MEDICINE | Age: 80
End: 2025-06-09

## 2025-06-09 DIAGNOSIS — I10 PRIMARY HYPERTENSION: ICD-10-CM

## 2025-06-09 DIAGNOSIS — G47.33 OSA (OBSTRUCTIVE SLEEP APNEA): ICD-10-CM

## 2025-06-09 NOTE — TELEPHONE ENCOUNTER
She wanted Dr. Narvaez to know that she has had her nose surgery and is ready to start on the Zepbound now.

## 2025-06-10 ENCOUNTER — OFFICE VISIT (OUTPATIENT)
Age: 80
End: 2025-06-10

## 2025-06-10 DIAGNOSIS — M17.11 PRIMARY OSTEOARTHRITIS OF RIGHT KNEE: Primary | ICD-10-CM

## 2025-06-10 DIAGNOSIS — M17.12 PRIMARY OSTEOARTHRITIS OF LEFT KNEE: ICD-10-CM

## 2025-06-10 RX ORDER — BUPIVACAINE HYDROCHLORIDE 5 MG/ML
5 INJECTION, SOLUTION PERINEURAL ONCE
Status: COMPLETED | OUTPATIENT
Start: 2025-06-10 | End: 2025-06-10

## 2025-06-10 RX ORDER — BETAMETHASONE SODIUM PHOSPHATE AND BETAMETHASONE ACETATE 3; 3 MG/ML; MG/ML
12 INJECTION, SUSPENSION INTRA-ARTICULAR; INTRALESIONAL; INTRAMUSCULAR; SOFT TISSUE ONCE
Status: COMPLETED | OUTPATIENT
Start: 2025-06-10 | End: 2025-06-10

## 2025-06-10 RX ORDER — LIDOCAINE HYDROCHLORIDE 10 MG/ML
2.5 INJECTION, SOLUTION INFILTRATION; PERINEURAL ONCE
Status: COMPLETED | OUTPATIENT
Start: 2025-06-10 | End: 2025-06-10

## 2025-06-10 RX ADMIN — LIDOCAINE HYDROCHLORIDE 2.5 ML: 10 INJECTION, SOLUTION INFILTRATION; PERINEURAL at 16:06

## 2025-06-10 RX ADMIN — BETAMETHASONE SODIUM PHOSPHATE AND BETAMETHASONE ACETATE 12 MG: 3; 3 INJECTION, SUSPENSION INTRA-ARTICULAR; INTRALESIONAL; INTRAMUSCULAR; SOFT TISSUE at 16:04

## 2025-06-10 RX ADMIN — LIDOCAINE HYDROCHLORIDE 2.5 ML: 10 INJECTION, SOLUTION INFILTRATION; PERINEURAL at 16:07

## 2025-06-10 RX ADMIN — BUPIVACAINE HYDROCHLORIDE 25 MG: 5 INJECTION, SOLUTION PERINEURAL at 16:05

## 2025-06-10 RX ADMIN — BETAMETHASONE SODIUM PHOSPHATE AND BETAMETHASONE ACETATE 12 MG: 3; 3 INJECTION, SUSPENSION INTRA-ARTICULAR; INTRALESIONAL; INTRAMUSCULAR; SOFT TISSUE at 16:02

## 2025-06-10 NOTE — PROGRESS NOTES
recognition computer software. Although all attempts are made to edit the dictation for accuracy, there may be errors in the transcription that are not intended.    Electronically signed by Sharan aT MD on 6/10/2025 at 4:06 PM.    The patient (or guardian, if applicable) and other individuals in attendance with the patient were advised that Artificial Intelligence will be utilized during this visit to record, process the conversation to generate a clinical note, and support improvement of the AI technology. The patient (or guardian, if applicable) and other individuals in attendance at the appointment consented to the use of AI, including the recording.

## 2025-07-02 DIAGNOSIS — G47.33 OSA (OBSTRUCTIVE SLEEP APNEA): ICD-10-CM

## 2025-07-02 DIAGNOSIS — I10 PRIMARY HYPERTENSION: ICD-10-CM

## 2025-07-03 DIAGNOSIS — G47.33 OSA (OBSTRUCTIVE SLEEP APNEA): ICD-10-CM

## 2025-07-03 DIAGNOSIS — I10 PRIMARY HYPERTENSION: ICD-10-CM

## 2025-07-03 RX ORDER — TIRZEPATIDE 2.5 MG/.5ML
INJECTION, SOLUTION SUBCUTANEOUS
Qty: 2 ML | Refills: 0 | OUTPATIENT
Start: 2025-07-03

## 2025-07-14 ENCOUNTER — OFFICE VISIT (OUTPATIENT)
Dept: ENT CLINIC | Age: 80
End: 2025-07-14
Payer: MEDICARE

## 2025-07-14 VITALS
BODY MASS INDEX: 32.96 KG/M2 | HEIGHT: 67 IN | WEIGHT: 210 LBS | DIASTOLIC BLOOD PRESSURE: 74 MMHG | SYSTOLIC BLOOD PRESSURE: 130 MMHG

## 2025-07-14 DIAGNOSIS — J34.3 HYPERTROPHY OF NASAL TURBINATES: Primary | ICD-10-CM

## 2025-07-14 PROCEDURE — G8417 CALC BMI ABV UP PARAM F/U: HCPCS | Performed by: OTOLARYNGOLOGY

## 2025-07-14 PROCEDURE — 1123F ACP DISCUSS/DSCN MKR DOCD: CPT | Performed by: OTOLARYNGOLOGY

## 2025-07-14 PROCEDURE — 99213 OFFICE O/P EST LOW 20 MIN: CPT | Performed by: OTOLARYNGOLOGY

## 2025-07-14 PROCEDURE — G8427 DOCREV CUR MEDS BY ELIG CLIN: HCPCS | Performed by: OTOLARYNGOLOGY

## 2025-07-14 PROCEDURE — 3075F SYST BP GE 130 - 139MM HG: CPT | Performed by: OTOLARYNGOLOGY

## 2025-07-14 PROCEDURE — 1090F PRES/ABSN URINE INCON ASSESS: CPT | Performed by: OTOLARYNGOLOGY

## 2025-07-14 PROCEDURE — 3078F DIAST BP <80 MM HG: CPT | Performed by: OTOLARYNGOLOGY

## 2025-07-14 PROCEDURE — G8399 PT W/DXA RESULTS DOCUMENT: HCPCS | Performed by: OTOLARYNGOLOGY

## 2025-07-14 PROCEDURE — 1159F MED LIST DOCD IN RCRD: CPT | Performed by: OTOLARYNGOLOGY

## 2025-07-14 PROCEDURE — 1036F TOBACCO NON-USER: CPT | Performed by: OTOLARYNGOLOGY

## 2025-07-14 ASSESSMENT — ENCOUNTER SYMPTOMS
RESPIRATORY NEGATIVE: 1
EYES NEGATIVE: 1
ALLERGIC/IMMUNOLOGIC NEGATIVE: 1
GASTROINTESTINAL NEGATIVE: 1

## 2025-07-14 NOTE — PROGRESS NOTES
2025    Mel Damon (:  1945) is a 80 y.o. female, Established patient, here for evaluation of the following chief complaint(s):  Post-Op Check (Turbinate reduction )      Vitals:    25 1053   BP: 130/74   Weight: 95.3 kg (210 lb)   Height: 1.702 m (5' 7\")       Wt Readings from Last 3 Encounters:   25 95.3 kg (210 lb)   25 98.4 kg (217 lb)   25 98.4 kg (217 lb)       BP Readings from Last 3 Encounters:   25 130/74   25 (!) 107/55   25 118/60         SUBJECTIVE/OBJECTIVE:    Patient seen today for her nose.  I did turbinate ablation on her she is doing very well.  Recovered with no issues.        Review of Systems   Constitutional: Negative.    HENT: Negative.     Eyes: Negative.    Respiratory: Negative.     Cardiovascular: Negative.    Gastrointestinal: Negative.    Endocrine: Negative.    Musculoskeletal: Negative.    Skin: Negative.    Allergic/Immunologic: Negative.    Neurological: Negative.    Hematological: Negative.    Psychiatric/Behavioral: Negative.          Physical Exam  Vitals reviewed.   Constitutional:       Appearance: Normal appearance. She is normal weight.   HENT:      Head: Normocephalic and atraumatic.      Right Ear: Tympanic membrane, ear canal and external ear normal.      Left Ear: Tympanic membrane, ear canal and external ear normal.      Nose: Nose normal.      Mouth/Throat:      Mouth: Mucous membranes are moist.      Pharynx: Oropharynx is clear.   Eyes:      Extraocular Movements: Extraocular movements intact.      Pupils: Pupils are equal, round, and reactive to light.   Cardiovascular:      Rate and Rhythm: Normal rate and regular rhythm.   Pulmonary:      Effort: Pulmonary effort is normal.      Breath sounds: Normal breath sounds.   Musculoskeletal:      Cervical back: Normal range of motion.   Skin:     General: Skin is warm and dry.   Neurological:      General: No focal deficit present.      Mental Status: She is

## 2025-07-28 ENCOUNTER — OFFICE VISIT (OUTPATIENT)
Dept: INTERNAL MEDICINE | Age: 80
End: 2025-07-28
Payer: MEDICARE

## 2025-07-28 VITALS
WEIGHT: 203 LBS | HEIGHT: 67 IN | HEART RATE: 72 BPM | SYSTOLIC BLOOD PRESSURE: 106 MMHG | BODY MASS INDEX: 31.86 KG/M2 | OXYGEN SATURATION: 95 % | DIASTOLIC BLOOD PRESSURE: 60 MMHG

## 2025-07-28 DIAGNOSIS — G47.33 OSA (OBSTRUCTIVE SLEEP APNEA): ICD-10-CM

## 2025-07-28 DIAGNOSIS — E55.9 VITAMIN D DEFICIENCY: ICD-10-CM

## 2025-07-28 DIAGNOSIS — Z13.1 SCREENING FOR DIABETES MELLITUS: ICD-10-CM

## 2025-07-28 DIAGNOSIS — L81.9 ATYPICAL PIGMENTED SKIN LESION: ICD-10-CM

## 2025-07-28 DIAGNOSIS — Z86.711 HISTORY OF PULMONARY EMBOLISM: Primary | ICD-10-CM

## 2025-07-28 DIAGNOSIS — M17.0 PRIMARY OSTEOARTHRITIS OF BOTH KNEES: ICD-10-CM

## 2025-07-28 DIAGNOSIS — I10 PRIMARY HYPERTENSION: ICD-10-CM

## 2025-07-28 PROCEDURE — 1090F PRES/ABSN URINE INCON ASSESS: CPT | Performed by: INTERNAL MEDICINE

## 2025-07-28 PROCEDURE — 1159F MED LIST DOCD IN RCRD: CPT | Performed by: INTERNAL MEDICINE

## 2025-07-28 PROCEDURE — G8417 CALC BMI ABV UP PARAM F/U: HCPCS | Performed by: INTERNAL MEDICINE

## 2025-07-28 PROCEDURE — 3074F SYST BP LT 130 MM HG: CPT | Performed by: INTERNAL MEDICINE

## 2025-07-28 PROCEDURE — G8427 DOCREV CUR MEDS BY ELIG CLIN: HCPCS | Performed by: INTERNAL MEDICINE

## 2025-07-28 PROCEDURE — 3078F DIAST BP <80 MM HG: CPT | Performed by: INTERNAL MEDICINE

## 2025-07-28 PROCEDURE — 99214 OFFICE O/P EST MOD 30 MIN: CPT | Performed by: INTERNAL MEDICINE

## 2025-07-28 PROCEDURE — 1036F TOBACCO NON-USER: CPT | Performed by: INTERNAL MEDICINE

## 2025-07-28 PROCEDURE — G8399 PT W/DXA RESULTS DOCUMENT: HCPCS | Performed by: INTERNAL MEDICINE

## 2025-07-28 PROCEDURE — 1123F ACP DISCUSS/DSCN MKR DOCD: CPT | Performed by: INTERNAL MEDICINE

## 2025-08-26 ENCOUNTER — TELEPHONE (OUTPATIENT)
Dept: INTERNAL MEDICINE | Age: 80
End: 2025-08-26

## 2025-08-26 RX ORDER — AZITHROMYCIN 250 MG/1
TABLET, FILM COATED ORAL
Qty: 6 TABLET | Refills: 0 | Status: SHIPPED | OUTPATIENT
Start: 2025-08-26 | End: 2025-09-05

## 2025-08-27 ENCOUNTER — TELEPHONE (OUTPATIENT)
Dept: INTERNAL MEDICINE | Age: 80
End: 2025-08-27

## 2025-08-27 DIAGNOSIS — G47.33 OSA (OBSTRUCTIVE SLEEP APNEA): ICD-10-CM

## (undated) DEVICE — MASK,OXYGEN,MED CONC,ADLT,7' TUB, UC: Brand: PENDING

## (undated) DEVICE — PITCHER PT 1200ML W HNDL CSR WRP

## (undated) DEVICE — YANKAUER,BULB TIP WITH VENT: Brand: ARGYLE

## (undated) DEVICE — TBG SMPL FLTR LINE NASL 02/C02 A/ BX/100

## (undated) DEVICE — CONTAINER,SPECIMEN,OR STERILE,4OZ: Brand: MEDLINE

## (undated) DEVICE — CUFF,BP,DISP,1 TUBE,ADULT,HP: Brand: MEDLINE

## (undated) DEVICE — COVER,MAYO STAND,STERILE: Brand: MEDLINE

## (undated) DEVICE — THE CHANNEL CLEANING BRUSH IS A NYLON FLEXI BRUSH ATTACHED TO A FLEXIBLE PLASTIC SHEATH DESIGNED TO SAFELY REMOVE DEBRIS FROM FLEXIBLE ENDOSCOPES.

## (undated) DEVICE — TUBING, SUCTION, 1/4" X 20', STRAIGHT: Brand: MEDLINE INDUSTRIES, INC.

## (undated) DEVICE — SENSR O2 OXIMAX FNGR A/ 18IN NONSTR

## (undated) DEVICE — Device: Brand: DEFENDO AIR/WATER/SUCTION AND BIOPSY VALVE

## (undated) DEVICE — TOWEL,OR,DSP,ST,BLUE,DLX,4/PK,20PK/CS: Brand: MEDLINE

## (undated) DEVICE — CONMED SCOPE SAVER BITE BLOCK, 20X27 MM: Brand: SCOPE SAVER

## (undated) DEVICE — SINGLE-USE POLYPECTOMY SNARE: Brand: CAPTIVATOR II

## (undated) DEVICE — THE SINGLE USE ETRAP – POLYP TRAP IS USED FOR SUCTION RETRIEVAL OF ENDOSCOPICALLY REMOVED POLYPS.: Brand: ETRAP

## (undated) DEVICE — SYRINGE MED 10ML TRNSLUC BRL PLUNG BLK MRK POLYPR CTRL

## (undated) DEVICE — ENDOGATOR AUXILIARY WATER JET CONNECTOR: Brand: ENDOGATOR

## (undated) DEVICE — FRCP BX RADJAW4 NDL 2.8 240 STD OG

## (undated) DEVICE — GAUZE,SPONGE,4"X4",8PLY,STRL,LF,10/TRAY: Brand: MEDLINE

## (undated) DEVICE — SYRINGE 20ML LL S/C 50

## (undated) DEVICE — REFLEX ULTRA 45 WITH INTEGRATED CABLE: Brand: COBLATION

## (undated) DEVICE — SPONGE,NEURO,0.5"X3",XR,STRL,LF,10/PK: Brand: MEDLINE